# Patient Record
Sex: FEMALE | Race: WHITE | NOT HISPANIC OR LATINO | URBAN - METROPOLITAN AREA
[De-identification: names, ages, dates, MRNs, and addresses within clinical notes are randomized per-mention and may not be internally consistent; named-entity substitution may affect disease eponyms.]

---

## 2017-03-09 ENCOUNTER — OFFICE (OUTPATIENT)
Dept: URBAN - METROPOLITAN AREA CLINIC 64 | Facility: CLINIC | Age: 47
End: 2017-03-09
Payer: COMMERCIAL

## 2017-03-09 VITALS
WEIGHT: 114 LBS | HEART RATE: 93 BPM | SYSTOLIC BLOOD PRESSURE: 94 MMHG | DIASTOLIC BLOOD PRESSURE: 56 MMHG | HEIGHT: 60 IN

## 2017-03-09 DIAGNOSIS — R15.9 FULL INCONTINENCE OF FECES: ICD-10-CM

## 2017-03-09 DIAGNOSIS — R19.4 CHANGE IN BOWEL HABIT: ICD-10-CM

## 2017-03-09 DIAGNOSIS — R13.10 DYSPHAGIA, UNSPECIFIED: ICD-10-CM

## 2017-03-09 DIAGNOSIS — Z80.0 FAMILY HISTORY OF MALIGNANT NEOPLASM OF DIGESTIVE ORGANS: ICD-10-CM

## 2017-03-09 DIAGNOSIS — K59.1 FUNCTIONAL DIARRHEA: ICD-10-CM

## 2017-03-09 PROCEDURE — 99204 OFFICE O/P NEW MOD 45 MIN: CPT | Performed by: NURSE PRACTITIONER

## 2017-03-31 ENCOUNTER — ON CAMPUS - OUTPATIENT (OUTPATIENT)
Dept: URBAN - METROPOLITAN AREA HOSPITAL 77 | Facility: HOSPITAL | Age: 47
End: 2017-03-31
Payer: COMMERCIAL

## 2017-03-31 DIAGNOSIS — Z80.0 FAMILY HISTORY OF MALIGNANT NEOPLASM OF DIGESTIVE ORGANS: ICD-10-CM

## 2017-03-31 DIAGNOSIS — R13.10 DYSPHAGIA, UNSPECIFIED: ICD-10-CM

## 2017-03-31 DIAGNOSIS — K29.90 GASTRODUODENITIS, UNSPECIFIED, WITHOUT BLEEDING: ICD-10-CM

## 2017-03-31 DIAGNOSIS — D12.5 BENIGN NEOPLASM OF SIGMOID COLON: ICD-10-CM

## 2017-03-31 DIAGNOSIS — D12.3 BENIGN NEOPLASM OF TRANSVERSE COLON: ICD-10-CM

## 2017-03-31 DIAGNOSIS — D12.2 BENIGN NEOPLASM OF ASCENDING COLON: ICD-10-CM

## 2017-03-31 PROCEDURE — 45381 COLONOSCOPY SUBMUCOUS NJX: CPT | Mod: 59 | Performed by: INTERNAL MEDICINE

## 2017-03-31 PROCEDURE — 43450 DILATE ESOPHAGUS 1/MULT PASS: CPT | Performed by: INTERNAL MEDICINE

## 2017-03-31 PROCEDURE — 45385 COLONOSCOPY W/LESION REMOVAL: CPT | Mod: PT | Performed by: INTERNAL MEDICINE

## 2017-03-31 PROCEDURE — 43239 EGD BIOPSY SINGLE/MULTIPLE: CPT | Performed by: INTERNAL MEDICINE

## 2017-04-14 ENCOUNTER — OFFICE (OUTPATIENT)
Dept: URBAN - METROPOLITAN AREA CLINIC 64 | Facility: CLINIC | Age: 47
End: 2017-04-14
Payer: COMMERCIAL

## 2017-04-14 VITALS — HEIGHT: 60 IN | SYSTOLIC BLOOD PRESSURE: 113 MMHG | HEART RATE: 92 BPM | DIASTOLIC BLOOD PRESSURE: 67 MMHG

## 2017-04-14 DIAGNOSIS — Z80.0 FAMILY HISTORY OF MALIGNANT NEOPLASM OF DIGESTIVE ORGANS: ICD-10-CM

## 2017-04-14 DIAGNOSIS — K59.1 FUNCTIONAL DIARRHEA: ICD-10-CM

## 2017-04-14 DIAGNOSIS — R15.9 FULL INCONTINENCE OF FECES: ICD-10-CM

## 2017-04-14 DIAGNOSIS — Z86.010 PERSONAL HISTORY OF COLONIC POLYPS: ICD-10-CM

## 2017-04-14 PROCEDURE — 99213 OFFICE O/P EST LOW 20 MIN: CPT | Performed by: NURSE PRACTITIONER

## 2017-04-14 RX ORDER — COLESTIPOL HYDROCHLORIDE 1 G/1
4 TABLET, FILM COATED ORAL
Qty: 120 | Refills: 11 | Status: COMPLETED
Start: 2017-04-14 | End: 2017-04-24

## 2017-04-15 ENCOUNTER — INPATIENT HOSPITAL (OUTPATIENT)
Dept: URBAN - METROPOLITAN AREA HOSPITAL 84 | Facility: HOSPITAL | Age: 47
End: 2017-04-15
Payer: COMMERCIAL

## 2017-04-15 DIAGNOSIS — K91.841 POSTPROCEDURAL HEMORRHAGE OF A DIGESTIVE SYSTEM ORGAN OR STR: ICD-10-CM

## 2017-04-15 PROCEDURE — 43255 EGD CONTROL BLEEDING ANY: CPT | Performed by: INTERNAL MEDICINE

## 2017-06-16 ENCOUNTER — OFFICE (OUTPATIENT)
Dept: URBAN - METROPOLITAN AREA CLINIC 64 | Facility: CLINIC | Age: 47
End: 2017-06-16
Payer: COMMERCIAL

## 2017-06-16 VITALS
SYSTOLIC BLOOD PRESSURE: 115 MMHG | DIASTOLIC BLOOD PRESSURE: 67 MMHG | HEIGHT: 60 IN | HEART RATE: 91 BPM | WEIGHT: 122 LBS

## 2017-06-16 DIAGNOSIS — Z86.010 PERSONAL HISTORY OF COLONIC POLYPS: ICD-10-CM

## 2017-06-16 DIAGNOSIS — K21.9 GASTRO-ESOPHAGEAL REFLUX DISEASE WITHOUT ESOPHAGITIS: ICD-10-CM

## 2017-06-16 DIAGNOSIS — K59.1 FUNCTIONAL DIARRHEA: ICD-10-CM

## 2017-06-16 DIAGNOSIS — R15.9 FULL INCONTINENCE OF FECES: ICD-10-CM

## 2017-06-16 PROCEDURE — 99214 OFFICE O/P EST MOD 30 MIN: CPT | Performed by: NURSE PRACTITIONER

## 2017-06-16 RX ORDER — LOPERAMIDE HCL 2 MG
TABLET ORAL
Qty: 60 | Refills: 5 | Status: ACTIVE
Start: 2017-06-16

## 2017-08-31 ENCOUNTER — ON CAMPUS - OUTPATIENT (OUTPATIENT)
Dept: URBAN - METROPOLITAN AREA HOSPITAL 77 | Facility: HOSPITAL | Age: 47
End: 2017-08-31
Payer: COMMERCIAL

## 2017-08-31 DIAGNOSIS — Z86.010 PERSONAL HISTORY OF COLONIC POLYPS: ICD-10-CM

## 2017-08-31 DIAGNOSIS — K63.5 POLYP OF COLON: ICD-10-CM

## 2017-08-31 DIAGNOSIS — Z80.0 FAMILY HISTORY OF MALIGNANT NEOPLASM OF DIGESTIVE ORGANS: ICD-10-CM

## 2017-08-31 DIAGNOSIS — Z98.890 OTHER SPECIFIED POSTPROCEDURAL STATES: ICD-10-CM

## 2017-08-31 DIAGNOSIS — D12.5 BENIGN NEOPLASM OF SIGMOID COLON: ICD-10-CM

## 2017-08-31 DIAGNOSIS — D12.3 BENIGN NEOPLASM OF TRANSVERSE COLON: ICD-10-CM

## 2017-08-31 PROCEDURE — 45380 COLONOSCOPY AND BIOPSY: CPT | Mod: 59 | Performed by: INTERNAL MEDICINE

## 2017-08-31 PROCEDURE — 45385 COLONOSCOPY W/LESION REMOVAL: CPT | Mod: PT | Performed by: INTERNAL MEDICINE

## 2017-09-14 ENCOUNTER — OFFICE (OUTPATIENT)
Dept: URBAN - METROPOLITAN AREA CLINIC 64 | Facility: CLINIC | Age: 47
End: 2017-09-14
Payer: COMMERCIAL

## 2017-09-14 VITALS
HEIGHT: 60 IN | WEIGHT: 111 LBS | DIASTOLIC BLOOD PRESSURE: 72 MMHG | SYSTOLIC BLOOD PRESSURE: 113 MMHG | HEART RATE: 88 BPM

## 2017-09-14 DIAGNOSIS — K59.1 FUNCTIONAL DIARRHEA: ICD-10-CM

## 2017-09-14 DIAGNOSIS — R15.9 FULL INCONTINENCE OF FECES: ICD-10-CM

## 2017-09-14 DIAGNOSIS — K21.9 GASTRO-ESOPHAGEAL REFLUX DISEASE WITHOUT ESOPHAGITIS: ICD-10-CM

## 2017-09-14 PROCEDURE — 99213 OFFICE O/P EST LOW 20 MIN: CPT | Performed by: NURSE PRACTITIONER

## 2017-12-05 ENCOUNTER — OFFICE (OUTPATIENT)
Dept: URBAN - METROPOLITAN AREA CLINIC 64 | Facility: CLINIC | Age: 47
End: 2017-12-05
Payer: COMMERCIAL

## 2017-12-05 VITALS
HEIGHT: 60 IN | HEART RATE: 90 BPM | DIASTOLIC BLOOD PRESSURE: 74 MMHG | WEIGHT: 121 LBS | SYSTOLIC BLOOD PRESSURE: 120 MMHG

## 2017-12-05 DIAGNOSIS — R15.9 FULL INCONTINENCE OF FECES: ICD-10-CM

## 2017-12-05 DIAGNOSIS — K59.1 FUNCTIONAL DIARRHEA: ICD-10-CM

## 2017-12-05 PROCEDURE — 99213 OFFICE O/P EST LOW 20 MIN: CPT | Performed by: NURSE PRACTITIONER

## 2017-12-05 RX ORDER — ELUXADOLINE 75 MG/1
150 TABLET, FILM COATED ORAL
Qty: 60 | Refills: 0 | Status: ACTIVE
Start: 2017-12-05

## 2018-01-09 ENCOUNTER — OFFICE (OUTPATIENT)
Dept: URBAN - METROPOLITAN AREA CLINIC 64 | Facility: CLINIC | Age: 48
End: 2018-01-09
Payer: COMMERCIAL

## 2018-01-09 VITALS — HEIGHT: 60 IN

## 2018-01-09 DIAGNOSIS — R15.9 FULL INCONTINENCE OF FECES: ICD-10-CM

## 2018-01-09 PROCEDURE — 64561 IMPLANT NEUROELECTRODES: CPT | Mod: 50 | Performed by: INTERNAL MEDICINE

## 2018-01-12 ENCOUNTER — OFFICE (OUTPATIENT)
Dept: URBAN - METROPOLITAN AREA CLINIC 64 | Facility: CLINIC | Age: 48
End: 2018-01-12
Payer: COMMERCIAL

## 2018-01-12 VITALS
DIASTOLIC BLOOD PRESSURE: 79 MMHG | HEIGHT: 60 IN | SYSTOLIC BLOOD PRESSURE: 139 MMHG | HEART RATE: 89 BPM | WEIGHT: 120 LBS

## 2018-01-12 DIAGNOSIS — R15.9 FULL INCONTINENCE OF FECES: ICD-10-CM

## 2018-01-12 PROCEDURE — 99213 OFFICE O/P EST LOW 20 MIN: CPT | Performed by: INTERNAL MEDICINE

## 2018-04-06 ENCOUNTER — OFFICE (OUTPATIENT)
Dept: URBAN - METROPOLITAN AREA CLINIC 64 | Facility: CLINIC | Age: 48
End: 2018-04-06
Payer: COMMERCIAL

## 2018-04-06 VITALS
SYSTOLIC BLOOD PRESSURE: 114 MMHG | HEART RATE: 92 BPM | DIASTOLIC BLOOD PRESSURE: 75 MMHG | HEIGHT: 60 IN | WEIGHT: 114 LBS

## 2018-04-06 DIAGNOSIS — R11.2 NAUSEA WITH VOMITING, UNSPECIFIED: ICD-10-CM

## 2018-04-06 DIAGNOSIS — R15.9 FULL INCONTINENCE OF FECES: ICD-10-CM

## 2018-04-06 DIAGNOSIS — K59.1 FUNCTIONAL DIARRHEA: ICD-10-CM

## 2018-04-06 PROCEDURE — 99213 OFFICE O/P EST LOW 20 MIN: CPT | Performed by: NURSE PRACTITIONER

## 2018-04-06 RX ORDER — ONDANSETRON 4 MG/1
TABLET, ORALLY DISINTEGRATING ORAL
Qty: 45 | Refills: 1 | Status: ACTIVE

## 2018-05-14 ENCOUNTER — OFFICE (OUTPATIENT)
Dept: URBAN - METROPOLITAN AREA CLINIC 64 | Facility: CLINIC | Age: 48
End: 2018-05-14
Payer: COMMERCIAL

## 2018-05-14 VITALS
WEIGHT: 118 LBS | SYSTOLIC BLOOD PRESSURE: 108 MMHG | DIASTOLIC BLOOD PRESSURE: 71 MMHG | HEART RATE: 89 BPM | HEIGHT: 60 IN

## 2018-05-14 DIAGNOSIS — K59.1 FUNCTIONAL DIARRHEA: ICD-10-CM

## 2018-05-14 DIAGNOSIS — K21.9 GASTRO-ESOPHAGEAL REFLUX DISEASE WITHOUT ESOPHAGITIS: ICD-10-CM

## 2018-05-14 DIAGNOSIS — R15.9 FULL INCONTINENCE OF FECES: ICD-10-CM

## 2018-05-14 PROCEDURE — 99213 OFFICE O/P EST LOW 20 MIN: CPT | Performed by: NURSE PRACTITIONER

## 2018-09-14 ENCOUNTER — ON CAMPUS - OUTPATIENT (OUTPATIENT)
Dept: URBAN - METROPOLITAN AREA HOSPITAL 77 | Facility: HOSPITAL | Age: 48
End: 2018-09-14
Payer: COMMERCIAL

## 2018-09-14 DIAGNOSIS — Z86.010 PERSONAL HISTORY OF COLONIC POLYPS: ICD-10-CM

## 2018-09-14 DIAGNOSIS — K62.89 OTHER SPECIFIED DISEASES OF ANUS AND RECTUM: ICD-10-CM

## 2018-09-14 DIAGNOSIS — R15.9 FULL INCONTINENCE OF FECES: ICD-10-CM

## 2018-09-14 DIAGNOSIS — K55.21 ANGIODYSPLASIA OF COLON WITH HEMORRHAGE: ICD-10-CM

## 2018-09-14 DIAGNOSIS — K52.9 NONINFECTIVE GASTROENTERITIS AND COLITIS, UNSPECIFIED: ICD-10-CM

## 2018-09-14 DIAGNOSIS — R19.7 DIARRHEA, UNSPECIFIED: ICD-10-CM

## 2018-09-14 PROCEDURE — 45382 COLONOSCOPY W/CONTROL BLEED: CPT | Mod: 59 | Performed by: INTERNAL MEDICINE

## 2018-09-14 PROCEDURE — 45380 COLONOSCOPY AND BIOPSY: CPT | Performed by: INTERNAL MEDICINE

## 2018-12-06 ENCOUNTER — HOSPITAL ENCOUNTER (OUTPATIENT)
Dept: RHEUMATOLOGY | Facility: CLINIC | Age: 48
Discharge: HOME | End: 2018-12-06
Attending: INTERNAL MEDICINE | Admitting: INTERNAL MEDICINE

## 2018-12-06 ENCOUNTER — HOSPITAL ENCOUNTER (OUTPATIENT)
Dept: LAB | Facility: HOSPITAL | Age: 48
Discharge: HOME | End: 2018-12-06
Attending: INTERNAL MEDICINE | Admitting: INTERNAL MEDICINE

## 2018-12-06 LAB
ALBUMIN SERPL-MCNC: 3.3 G/DL (ref 3.5–4.8)
ALBUMIN SERPL-MCNC: 3.3 G/DL (ref 3.5–4.8)
ALBUMIN/GLOB SERPL: 1 {RATIO} (ref 1–1.7)
ALP SERPL-CCNC: 116 IU/L (ref 32–91)
ALPHA1 GLOB FLD ELPH-MCNC: 0.2 GM/DL (ref 0.1–0.4)
ALPHA2 GLOB SERPL ELPH-MCNC: 0.9 GM/DL (ref 0.5–1)
ALT SERPL-CCNC: 19 IU/L (ref 14–54)
AMPICILLIN SUSC ISLT: NORMAL
ANION GAP SERPL CALC-SCNC: 14.2 MMOL/L (ref 10–20)
AST SERPL-CCNC: 23 IU/L (ref 15–41)
AZTREONAM SUSC ISLT: NORMAL
B-GLOBULIN SERPL ELPH-MCNC: 1.1 GM/DL (ref 0.7–1.4)
BACTERIA ISLT: NORMAL
BACTERIA SPEC AEROBE CULT: NORMAL
BASOPHILS # BLD AUTO: 0 10*3/UL (ref 0–0.2)
BASOPHILS NFR BLD AUTO: 1 % (ref 0–2)
BILIRUB SERPL-MCNC: 1 MG/DL (ref 0.3–1.2)
BILIRUB UR QL STRIP: NEGATIVE MG/DL
BUN SERPL-MCNC: 14 MG/DL (ref 8–20)
BUN/CREAT SERPL: 14 (ref 5.4–26.2)
CALCIUM SERPL-MCNC: 8.7 MG/DL (ref 8.9–10.3)
CASTS URNS QL MICRO: ABNORMAL /[LPF]
CEFAZOLIN SUSC ISLT: NORMAL
CEFEPIME SUSC ISLT: NORMAL
CEFTRIAXONE SUSC ISLT: NORMAL
CHLORIDE SERPL-SCNC: 100 MMOL/L (ref 101–111)
CIPROFLOXACIN SUSC ISLT: NORMAL
CK SERPL-CCNC: 68 IU/L (ref 38–234)
COLONY COUNT: NORMAL
COLOR UR: YELLOW
CONV BACTERIA IN URINE MICRO: ABNORMAL
CONV CLARITY OF URINE: ABNORMAL
CONV CO2: 23 MMOL/L (ref 22–32)
CONV HYALINE CASTS IN URINE MICRO: 4 /[LPF] (ref 0–5)
CONV PROTEIN IN URINE BY AUTOMATED TEST STRIP: NEGATIVE MG/DL
CONV SMALL ROUND CELLS: ABNORMAL /[HPF]
CONV TOTAL PROTEIN: 6.5 G/DL (ref 6.1–7.9)
CONV TOTAL PROTEIN: 6.7 G/DL (ref 6.1–7.9)
CONV UROBILINOGEN IN URINE BY AUTOMATED TEST STRIP: 0.2 MG/DL
CREAT UR-MCNC: 1 MG/DL (ref 0.4–1)
CRP SERPL-MCNC: 0.04 MG/DL (ref 0–0.7)
CULTURE INDICATED?: ABNORMAL
DIFFERENTIAL METHOD BLD: (no result)
EOSINOPHIL # BLD AUTO: 0.1 10*3/UL (ref 0–0.3)
EOSINOPHIL # BLD AUTO: 1 % (ref 0–3)
ERYTHROCYTE [DISTWIDTH] IN BLOOD BY AUTOMATED COUNT: 15.2 % (ref 11.5–14.5)
ERYTHROCYTE [SEDIMENTATION RATE] IN BLOOD BY WESTERGREN METHOD: 40 MM/HR (ref 0–20)
GAMMA GLOB SERPL ELPH-MCNC: 1 GM/DL (ref 0.6–1.6)
GLOBULIN UR ELPH-MCNC: 3.4 G/DL (ref 2.5–3.8)
GLUCOSE SERPL-MCNC: 367 MG/DL (ref 65–99)
GLUCOSE UR QL: 250 MG/DL
HCT VFR BLD AUTO: 35.6 % (ref 35–49)
HGB BLD-MCNC: 11.6 G/DL (ref 12–15)
HGB UR QL STRIP: ABNORMAL
INSULIN SERPL-ACNC: ABNORMAL U[IU]/ML
KETONES UR QL STRIP: NEGATIVE MG/DL
LEUKOCYTE ESTERASE UR QL STRIP: ABNORMAL
LEVOFLOXACIN SUSC ISLT: NORMAL
LYMPHOCYTES # BLD AUTO: 2.4 10*3/UL (ref 0.8–4.8)
LYMPHOCYTES NFR BLD AUTO: 26 % (ref 18–42)
Lab: NORMAL
MCH RBC QN AUTO: 27.7 PG (ref 26–32)
MCHC RBC AUTO-ENTMCNC: 32.5 G/DL (ref 32–36)
MCV RBC AUTO: 85.2 FL (ref 80–94)
MEROPENEM SUSC ISLT: NORMAL
MICRO REPORT STATUS: NORMAL
MONOCYTES # BLD AUTO: 0.5 10*3/UL (ref 0.1–1.3)
MONOCYTES NFR BLD AUTO: 6 % (ref 2–11)
NEUTROPHILS # BLD AUTO: 6.3 10*3/UL (ref 2.3–8.6)
NEUTROPHILS NFR BLD AUTO: 66 % (ref 50–75)
NITRITE UR QL STRIP: POSITIVE
NITROFURANTOIN SUSC ISLT: NORMAL
NRBC BLD AUTO-RTO: 0 /100{WBCS}
NRBC/RBC NFR BLD MANUAL: 0 10*3/UL
PH UR STRIP.AUTO: 5 [PH] (ref 4.5–8)
PIP+TAZO SUSC ISLT: NORMAL
PLATELET # BLD AUTO: 326 10*3/UL (ref 150–450)
PMV BLD AUTO: 9.4 FL (ref 7.4–10.4)
POTASSIUM SERPL-SCNC: 4.2 MMOL/L (ref 3.6–5.1)
RBC # BLD AUTO: 4.17 10*6/UL (ref 4–5.4)
RBC #/AREA URNS HPF: 1 /[HPF] (ref 0–3)
SODIUM SERPL-SCNC: 133 MMOL/L (ref 136–144)
SP GR UR: 1.02 (ref 1–1.03)
SPECIMEN SOURCE: NORMAL
SPERM URNS QL MICRO: ABNORMAL /[HPF]
SQUAMOUS SPT QL MICRO: 5 /[HPF] (ref 0–5)
SUSC METH SPEC: NORMAL
TETRACYCLINE SUSC ISLT: NORMAL
TOBRAMYCIN SUSC ISLT: NORMAL
TRIMETHOPRIM/SULFA: NORMAL
UNIDENT CRYS URNS QL MICRO: ABNORMAL /[HPF]
WBC # BLD AUTO: 9.3 10*3/UL (ref 4.5–11.5)
WBC #/AREA URNS HPF: 12 /[HPF] (ref 0–5)
YEAST SPEC QL WET PREP: ABNORMAL /[HPF]

## 2018-12-06 PROCEDURE — 86481 TB AG RESPONSE T-CELL SUSP: CPT

## 2018-12-07 ENCOUNTER — LAB REQUISITION (OUTPATIENT)
Dept: LAB | Facility: HOSPITAL | Age: 48
End: 2018-12-07

## 2018-12-07 DIAGNOSIS — Z00.00 ROUTINE GENERAL MEDICAL EXAMINATION AT A HEALTH CARE FACILITY: ICD-10-CM

## 2018-12-07 LAB
25(OH)D3 SERPL-MCNC: 34 NG/ML (ref 30–100)
HAV IGM SERPL QL IA: NONREACTIVE
HBV CORE IGM SERPL QL IA: NONREACTIVE
HBV SURFACE AG SERPL QL IA: NONREACTIVE
HCV AB SER DONR QL: NONREACTIVE

## 2018-12-08 LAB
TSPOT INTERPRETATION: NEGATIVE
TSPOT NIL CONTROL INTERPRETATION: NORMAL
TSPOT PANEL A: 0
TSPOT PANEL B: 0
TSPOT POS CONTROL INTERPRETATION: NORMAL

## 2018-12-09 LAB — ACE SERPL-CCNC: 7 U/L (ref 9–67)

## 2018-12-10 LAB — TSPOT INTERPRETATION: NEGATIVE

## 2019-12-12 ENCOUNTER — TELEPHONE (OUTPATIENT)
Dept: ENDOCRINOLOGY | Facility: CLINIC | Age: 49
End: 2019-12-12

## 2019-12-12 NOTE — TELEPHONE ENCOUNTER
Pt called and LVM regarding elevated BG's.  Attempted to call pt but no answer using phone number in chart.  Pt has not been seen 10/22/18 and will need an appt ASAP if and when she calls back

## 2020-01-08 ENCOUNTER — OFFICE (OUTPATIENT)
Dept: URBAN - METROPOLITAN AREA CLINIC 64 | Facility: CLINIC | Age: 50
End: 2020-01-08
Payer: COMMERCIAL

## 2020-01-08 VITALS
HEIGHT: 60 IN | HEART RATE: 85 BPM | SYSTOLIC BLOOD PRESSURE: 75 MMHG | DIASTOLIC BLOOD PRESSURE: 54 MMHG | WEIGHT: 120 LBS

## 2020-01-08 DIAGNOSIS — K21.9 GASTRO-ESOPHAGEAL REFLUX DISEASE WITHOUT ESOPHAGITIS: ICD-10-CM

## 2020-01-08 DIAGNOSIS — K59.1 FUNCTIONAL DIARRHEA: ICD-10-CM

## 2020-01-08 DIAGNOSIS — R15.9 FULL INCONTINENCE OF FECES: ICD-10-CM

## 2020-01-08 PROCEDURE — 99213 OFFICE O/P EST LOW 20 MIN: CPT | Performed by: INTERNAL MEDICINE

## 2020-01-08 RX ORDER — PANTOPRAZOLE SODIUM 40 MG/1
40 TABLET, DELAYED RELEASE ORAL
Qty: 90 | Refills: 3 | Status: ACTIVE
Start: 2017-04-24

## 2020-01-08 RX ORDER — ELUXADOLINE 75 MG/1
150 TABLET, FILM COATED ORAL
Qty: 60 | Refills: 0 | Status: ACTIVE
Start: 2017-12-05

## 2020-01-08 RX ORDER — LOPERAMIDE HYDROCHLORIDE 2 MG/1
8 CAPSULE ORAL
Qty: 120 | Refills: 0 | Status: ACTIVE

## 2022-03-04 ENCOUNTER — LAB (OUTPATIENT)
Dept: LAB | Facility: HOSPITAL | Age: 52
End: 2022-03-04

## 2022-03-04 ENCOUNTER — TRANSCRIBE ORDERS (OUTPATIENT)
Dept: ADMINISTRATIVE | Facility: HOSPITAL | Age: 52
End: 2022-03-04

## 2022-03-04 DIAGNOSIS — Z01.818 PRE-OP TESTING: ICD-10-CM

## 2022-03-04 DIAGNOSIS — Z01.818 PRE-OP TESTING: Primary | ICD-10-CM

## 2022-03-04 PROCEDURE — 0202U NFCT DS 22 TRGT SARS-COV-2: CPT

## 2025-01-01 ENCOUNTER — APPOINTMENT (OUTPATIENT)
Dept: GENERAL RADIOLOGY | Facility: HOSPITAL | Age: 55
End: 2025-01-01
Payer: OTHER MISCELLANEOUS

## 2025-01-01 ENCOUNTER — APPOINTMENT (OUTPATIENT)
Dept: CT IMAGING | Facility: HOSPITAL | Age: 55
End: 2025-01-01
Payer: OTHER MISCELLANEOUS

## 2025-01-01 ENCOUNTER — HOSPITAL ENCOUNTER (INPATIENT)
Facility: HOSPITAL | Age: 55
LOS: 3 days | End: 2025-05-24
Attending: INTERNAL MEDICINE | Admitting: STUDENT IN AN ORGANIZED HEALTH CARE EDUCATION/TRAINING PROGRAM
Payer: OTHER MISCELLANEOUS

## 2025-01-01 ENCOUNTER — APPOINTMENT (OUTPATIENT)
Dept: CARDIOLOGY | Facility: HOSPITAL | Age: 55
End: 2025-01-01
Payer: OTHER MISCELLANEOUS

## 2025-01-01 ENCOUNTER — HOSPITAL ENCOUNTER (INPATIENT)
Facility: HOSPITAL | Age: 55
LOS: 1 days | End: 2025-05-21
Attending: EMERGENCY MEDICINE | Admitting: INTERNAL MEDICINE
Payer: OTHER MISCELLANEOUS

## 2025-01-01 ENCOUNTER — APPOINTMENT (OUTPATIENT)
Dept: CT IMAGING | Facility: HOSPITAL | Age: 55
End: 2025-01-01
Payer: MEDICAID

## 2025-01-01 VITALS
OXYGEN SATURATION: 100 % | HEART RATE: 75 BPM | SYSTOLIC BLOOD PRESSURE: 103 MMHG | TEMPERATURE: 98.1 F | DIASTOLIC BLOOD PRESSURE: 63 MMHG

## 2025-01-01 VITALS
WEIGHT: 119.49 LBS | TEMPERATURE: 95.2 F | HEART RATE: 80 BPM | OXYGEN SATURATION: 94 % | DIASTOLIC BLOOD PRESSURE: 56 MMHG | SYSTOLIC BLOOD PRESSURE: 96 MMHG | HEIGHT: 64 IN | RESPIRATION RATE: 22 BRPM | BODY MASS INDEX: 20.4 KG/M2

## 2025-01-01 DIAGNOSIS — E86.0 DEHYDRATION: ICD-10-CM

## 2025-01-01 DIAGNOSIS — E10.11 DIABETIC KETOACIDOSIS WITH COMA ASSOCIATED WITH TYPE 1 DIABETES MELLITUS: Primary | ICD-10-CM

## 2025-01-01 DIAGNOSIS — I63.9 CEREBROVASCULAR ACCIDENT (CVA), UNSPECIFIED MECHANISM: ICD-10-CM

## 2025-01-01 LAB
ABO GROUP BLD: NORMAL
ACETONE BLD QL: ABNORMAL
ALBUMIN SERPL-MCNC: 2.2 G/DL (ref 3.5–5.2)
ALBUMIN SERPL-MCNC: 3 G/DL (ref 3.5–5.2)
ALBUMIN/GLOB SERPL: 0.8 G/DL
ALBUMIN/GLOB SERPL: 1.9 G/DL
ALP SERPL-CCNC: 235 U/L (ref 39–117)
ALP SERPL-CCNC: 359 U/L (ref 39–117)
ALT SERPL W P-5'-P-CCNC: 13 U/L (ref 1–33)
ALT SERPL W P-5'-P-CCNC: 18 U/L (ref 1–33)
AMMONIA BLD-SCNC: 44 UMOL/L (ref 11–51)
AMPHET+METHAMPHET UR QL: NEGATIVE
AMPHETAMINES UR QL: NEGATIVE
ANION GAP SERPL CALCULATED.3IONS-SCNC: 20.6 MMOL/L (ref 5–15)
ANION GAP SERPL CALCULATED.3IONS-SCNC: 27.5 MMOL/L (ref 5–15)
ANION GAP SERPL CALCULATED.3IONS-SCNC: 28.4 MMOL/L (ref 5–15)
ANION GAP SERPL CALCULATED.3IONS-SCNC: 33 MMOL/L (ref 5–15)
ANION GAP SERPL CALCULATED.3IONS-SCNC: 35.9 MMOL/L (ref 5–15)
ANION GAP SERPL CALCULATED.3IONS-SCNC: 36.6 MMOL/L (ref 5–15)
AORTIC DIMENSIONLESS INDEX: 0.81 (DI)
APTT PPP: 28 SECONDS (ref 22.7–35.4)
ARTERIAL PATENCY WRIST A: POSITIVE
AST SERPL-CCNC: 23 U/L (ref 1–32)
AST SERPL-CCNC: 29 U/L (ref 1–32)
ATMOSPHERIC PRESS: ABNORMAL MM[HG]
ATMOSPHERIC PRESS: ABNORMAL MM[HG]
AV MEAN PRESS GRAD SYS DOP V1V2: 3 MMHG
AV VMAX SYS DOP: 126 CM/SEC
B PARAPERT DNA SPEC QL NAA+PROBE: NOT DETECTED
B PERT DNA SPEC QL NAA+PROBE: NOT DETECTED
BARBITURATES UR QL SCN: NEGATIVE
BASE EXCESS BLDA CALC-SCNC: -27.5 MMOL/L (ref 0–3)
BASE EXCESS BLDV CALC-SCNC: -22.9 MMOL/L (ref -2–2)
BASOPHILS # BLD AUTO: 0.01 10*3/MM3 (ref 0–0.2)
BASOPHILS # BLD AUTO: 0.01 10*3/MM3 (ref 0–0.2)
BASOPHILS # BLD AUTO: 0.03 10*3/MM3 (ref 0–0.2)
BASOPHILS NFR BLD AUTO: 0.1 % (ref 0–1.5)
BDY SITE: ABNORMAL
BDY SITE: ABNORMAL
BENZODIAZ UR QL SCN: NEGATIVE
BH BB BLOOD EXPIRATION DATE: NORMAL
BH BB BLOOD TYPE BARCODE: 6200
BH BB DISPENSE STATUS: NORMAL
BH BB PRODUCT CODE: NORMAL
BH BB UNIT NUMBER: NORMAL
BH CV ECHO LEFT VENTRICLE GLOBAL LONGITUDINAL STRAIN: -17.4 %
BH CV ECHO MEAS - ACS: 1.5 CM
BH CV ECHO MEAS - AO MAX PG: 6.4 MMHG
BH CV ECHO MEAS - AO V2 VTI: 26.5 CM
BH CV ECHO MEAS - AVA(I,D): 1.84 CM2
BH CV ECHO MEAS - EDV(CUBED): 42.9 ML
BH CV ECHO MEAS - EDV(MOD-SP4): 49.9 ML
BH CV ECHO MEAS - EF(MOD-SP4): 62.1 %
BH CV ECHO MEAS - ESV(CUBED): 15.6 ML
BH CV ECHO MEAS - ESV(MOD-SP4): 18.9 ML
BH CV ECHO MEAS - FS: 28.6 %
BH CV ECHO MEAS - IVS/LVPW: 1.14 CM
BH CV ECHO MEAS - IVSD: 0.8 CM
BH CV ECHO MEAS - LA DIMENSION: 2.7 CM
BH CV ECHO MEAS - LAT PEAK E' VEL: 12.1 CM/SEC
BH CV ECHO MEAS - LV DIASTOLIC VOL/BSA (35-75): 32.9 CM2
BH CV ECHO MEAS - LV MASS(C)D: 68.9 GRAMS
BH CV ECHO MEAS - LV MAX PG: 5.2 MMHG
BH CV ECHO MEAS - LV MEAN PG: 3 MMHG
BH CV ECHO MEAS - LV SYSTOLIC VOL/BSA (12-30): 12.5 CM2
BH CV ECHO MEAS - LV V1 MAX: 114 CM/SEC
BH CV ECHO MEAS - LV V1 VTI: 21.5 CM
BH CV ECHO MEAS - LVIDD: 3.5 CM
BH CV ECHO MEAS - LVIDS: 2.5 CM
BH CV ECHO MEAS - LVOT AREA: 2.27 CM2
BH CV ECHO MEAS - LVOT DIAM: 1.7 CM
BH CV ECHO MEAS - LVPWD: 0.7 CM
BH CV ECHO MEAS - MED PEAK E' VEL: 12.6 CM/SEC
BH CV ECHO MEAS - MV A MAX VEL: 76.2 CM/SEC
BH CV ECHO MEAS - MV DEC SLOPE: 861 CM/SEC2
BH CV ECHO MEAS - MV DEC TIME: 0.16 SEC
BH CV ECHO MEAS - MV E MAX VEL: 102 CM/SEC
BH CV ECHO MEAS - MV E/A: 1.34
BH CV ECHO MEAS - MV MAX PG: 6.1 MMHG
BH CV ECHO MEAS - MV MEAN PG: 3 MMHG
BH CV ECHO MEAS - MV P1/2T: 41.8 MSEC
BH CV ECHO MEAS - MV V2 VTI: 18.9 CM
BH CV ECHO MEAS - MVA(P1/2T): 5.3 CM2
BH CV ECHO MEAS - MVA(VTI): 2.6 CM2
BH CV ECHO MEAS - PA ACC TIME: 0.14 SEC
BH CV ECHO MEAS - PA V2 MAX: 113 CM/SEC
BH CV ECHO MEAS - RAP SYSTOLE: 3 MMHG
BH CV ECHO MEAS - RV MAX PG: 1.42 MMHG
BH CV ECHO MEAS - RV V1 MAX: 59.5 CM/SEC
BH CV ECHO MEAS - RV V1 VTI: 11.9 CM
BH CV ECHO MEAS - RVDD: 2.9 CM
BH CV ECHO MEAS - RVSP: 36.9 MMHG
BH CV ECHO MEAS - SV(LVOT): 48.8 ML
BH CV ECHO MEAS - SV(MOD-SP4): 31 ML
BH CV ECHO MEAS - SVI(LVOT): 32.2 ML/M2
BH CV ECHO MEAS - SVI(MOD-SP4): 20.4 ML/M2
BH CV ECHO MEAS - TAPSE (>1.6): 1.61 CM
BH CV ECHO MEAS - TR MAX PG: 33.9 MMHG
BH CV ECHO MEAS - TR MAX VEL: 291 CM/SEC
BH CV ECHO MEASUREMENTS AVERAGE E/E' RATIO: 8.26
BH CV ECHO SHUNT ASSESSMENT PERFORMED (HIDDEN SCRIPTING): 1
BH CV XLRA - TDI S': 11.1 CM/SEC
BILIRUB SERPL-MCNC: 0.2 MG/DL (ref 0–1.2)
BILIRUB SERPL-MCNC: 0.3 MG/DL (ref 0–1.2)
BILIRUB UR QL STRIP: NEGATIVE
BLD GP AB SCN SERPL QL: NEGATIVE
BUN SERPL-MCNC: 17 MG/DL (ref 6–20)
BUN SERPL-MCNC: 18 MG/DL (ref 6–20)
BUN/CREAT SERPL: 7.9 (ref 7–25)
BUN/CREAT SERPL: 8 (ref 7–25)
BUN/CREAT SERPL: 8.3 (ref 7–25)
BUN/CREAT SERPL: 8.6 (ref 7–25)
BUPRENORPHINE SERPL-MCNC: NEGATIVE NG/ML
C PNEUM DNA NPH QL NAA+NON-PROBE: NOT DETECTED
CA-I SERPL ISE-MCNC: 1.12 MMOL/L (ref 1.15–1.3)
CALCIUM SPEC-SCNC: 7.2 MG/DL (ref 8.6–10.5)
CALCIUM SPEC-SCNC: 7.3 MG/DL (ref 8.6–10.5)
CALCIUM SPEC-SCNC: 7.3 MG/DL (ref 8.6–10.5)
CALCIUM SPEC-SCNC: 7.4 MG/DL (ref 8.6–10.5)
CALCIUM SPEC-SCNC: 7.7 MG/DL (ref 8.6–10.5)
CALCIUM SPEC-SCNC: 8.2 MG/DL (ref 8.6–10.5)
CANNABINOIDS SERPL QL: NEGATIVE
CHLORIDE SERPL-SCNC: 102 MMOL/L (ref 98–107)
CHLORIDE SERPL-SCNC: 105 MMOL/L (ref 98–107)
CHLORIDE SERPL-SCNC: 105 MMOL/L (ref 98–107)
CHLORIDE SERPL-SCNC: 106 MMOL/L (ref 98–107)
CHLORIDE SERPL-SCNC: 108 MMOL/L (ref 98–107)
CHLORIDE SERPL-SCNC: 98 MMOL/L (ref 98–107)
CHOLEST SERPL-MCNC: 74 MG/DL (ref 0–200)
CK SERPL-CCNC: 269 U/L (ref 20–180)
CK SERPL-CCNC: 273 U/L (ref 20–180)
CLARITY UR: CLEAR
CO2 BLDA-SCNC: 5 MMOL/L (ref 22–29)
CO2 BLDA-SCNC: <5 MMOL/L (ref 22–29)
CO2 SERPL-SCNC: 10.5 MMOL/L (ref 22–29)
CO2 SERPL-SCNC: 14.4 MMOL/L (ref 22–29)
CO2 SERPL-SCNC: 3.1 MMOL/L (ref 22–29)
CO2 SERPL-SCNC: 3.4 MMOL/L (ref 22–29)
CO2 SERPL-SCNC: 6 MMOL/L (ref 22–29)
CO2 SERPL-SCNC: 9.6 MMOL/L (ref 22–29)
COCAINE UR QL: NEGATIVE
COHGB MFR BLD: 2.6 % (ref 0–3)
COLOR UR: YELLOW
CREAT SERPL-MCNC: 1.97 MG/DL (ref 0.57–1)
CREAT SERPL-MCNC: 2.04 MG/DL (ref 0.57–1)
CREAT SERPL-MCNC: 2.05 MG/DL (ref 0.57–1)
CREAT SERPL-MCNC: 2.06 MG/DL (ref 0.57–1)
CREAT SERPL-MCNC: 2.12 MG/DL (ref 0.57–1)
CREAT SERPL-MCNC: 2.27 MG/DL (ref 0.57–1)
CROSSMATCH INTERPRETATION: NORMAL
D-LACTATE SERPL-SCNC: 1.4 MMOL/L (ref 0.3–2)
DEPRECATED RDW RBC AUTO: 51.4 FL (ref 37–54)
DEPRECATED RDW RBC AUTO: 53 FL (ref 37–54)
DEPRECATED RDW RBC AUTO: 58.8 FL (ref 37–54)
EGFRCR SERPLBLD CKD-EPI 2021: 25.2 ML/MIN/1.73
EGFRCR SERPLBLD CKD-EPI 2021: 27.4 ML/MIN/1.73
EGFRCR SERPLBLD CKD-EPI 2021: 28.4 ML/MIN/1.73
EGFRCR SERPLBLD CKD-EPI 2021: 28.5 ML/MIN/1.73
EGFRCR SERPLBLD CKD-EPI 2021: 28.7 ML/MIN/1.73
EGFRCR SERPLBLD CKD-EPI 2021: 29.9 ML/MIN/1.73
EOSINOPHIL # BLD AUTO: 0 10*3/MM3 (ref 0–0.4)
EOSINOPHIL # BLD AUTO: 0 10*3/MM3 (ref 0–0.4)
EOSINOPHIL # BLD AUTO: 0.03 10*3/MM3 (ref 0–0.4)
EOSINOPHIL NFR BLD AUTO: 0 % (ref 0.3–6.2)
EOSINOPHIL NFR BLD AUTO: 0 % (ref 0.3–6.2)
EOSINOPHIL NFR BLD AUTO: 0.1 % (ref 0.3–6.2)
ERYTHROCYTE [DISTWIDTH] IN BLOOD BY AUTOMATED COUNT: 15.2 % (ref 12.3–15.4)
ERYTHROCYTE [DISTWIDTH] IN BLOOD BY AUTOMATED COUNT: 15.4 % (ref 12.3–15.4)
ERYTHROCYTE [DISTWIDTH] IN BLOOD BY AUTOMATED COUNT: 15.7 % (ref 12.3–15.4)
ETHANOL UR QL: <0.01 %
FERRITIN SERPL-MCNC: 558 NG/ML (ref 13–150)
FLUAV SUBTYP SPEC NAA+PROBE: NOT DETECTED
FLUBV RNA ISLT QL NAA+PROBE: NOT DETECTED
GEN 5 1HR TROPONIN T REFLEX: 88 NG/L
GLOBULIN UR ELPH-MCNC: 1.6 GM/DL
GLOBULIN UR ELPH-MCNC: 2.9 GM/DL
GLUCOSE BLDC GLUCOMTR-MCNC: 126 MG/DL (ref 70–105)
GLUCOSE BLDC GLUCOMTR-MCNC: 143 MG/DL (ref 70–105)
GLUCOSE BLDC GLUCOMTR-MCNC: 174 MG/DL (ref 70–105)
GLUCOSE BLDC GLUCOMTR-MCNC: 199 MG/DL (ref 70–105)
GLUCOSE BLDC GLUCOMTR-MCNC: 203 MG/DL (ref 70–105)
GLUCOSE BLDC GLUCOMTR-MCNC: 211 MG/DL (ref 70–105)
GLUCOSE BLDC GLUCOMTR-MCNC: 222 MG/DL (ref 70–105)
GLUCOSE BLDC GLUCOMTR-MCNC: 227 MG/DL (ref 70–105)
GLUCOSE BLDC GLUCOMTR-MCNC: 228 MG/DL (ref 70–105)
GLUCOSE BLDC GLUCOMTR-MCNC: 248 MG/DL (ref 70–105)
GLUCOSE BLDC GLUCOMTR-MCNC: 251 MG/DL (ref 70–105)
GLUCOSE BLDC GLUCOMTR-MCNC: 252 MG/DL (ref 70–105)
GLUCOSE BLDC GLUCOMTR-MCNC: 255 MG/DL (ref 70–105)
GLUCOSE BLDC GLUCOMTR-MCNC: 301 MG/DL (ref 70–105)
GLUCOSE BLDC GLUCOMTR-MCNC: 335 MG/DL (ref 70–105)
GLUCOSE BLDC GLUCOMTR-MCNC: 346 MG/DL (ref 70–105)
GLUCOSE BLDC GLUCOMTR-MCNC: 383 MG/DL (ref 70–105)
GLUCOSE BLDC GLUCOMTR-MCNC: 388 MG/DL (ref 70–105)
GLUCOSE SERPL-MCNC: 160 MG/DL (ref 65–99)
GLUCOSE SERPL-MCNC: 202 MG/DL (ref 65–99)
GLUCOSE SERPL-MCNC: 230 MG/DL (ref 65–99)
GLUCOSE SERPL-MCNC: 245 MG/DL (ref 65–99)
GLUCOSE SERPL-MCNC: 377 MG/DL (ref 65–99)
GLUCOSE SERPL-MCNC: 397 MG/DL (ref 65–99)
GLUCOSE UR STRIP-MCNC: ABNORMAL MG/DL
HADV DNA SPEC NAA+PROBE: NOT DETECTED
HAPTOGLOB SERPL-MCNC: 127 MG/DL (ref 30–200)
HBA1C MFR BLD: 10.3 % (ref 4.8–5.6)
HCO3 BLDA-SCNC: 1.8 MMOL/L (ref 21–28)
HCO3 BLDV-SCNC: 4.6 MMOL/L (ref 22–26)
HCOV 229E RNA SPEC QL NAA+PROBE: NOT DETECTED
HCOV HKU1 RNA SPEC QL NAA+PROBE: NOT DETECTED
HCOV NL63 RNA SPEC QL NAA+PROBE: NOT DETECTED
HCOV OC43 RNA SPEC QL NAA+PROBE: NOT DETECTED
HCT VFR BLD AUTO: 18.6 % (ref 34–46.6)
HCT VFR BLD AUTO: 19.2 % (ref 34–46.6)
HCT VFR BLD AUTO: 26.5 % (ref 34–46.6)
HCT VFR BLD AUTO: 32.9 % (ref 34–46.6)
HDLC SERPL-MCNC: 27 MG/DL (ref 40–60)
HEMODILUTION: NO
HGB BLD-MCNC: 5.7 G/DL (ref 12–15.9)
HGB BLD-MCNC: 5.8 G/DL (ref 12–15.9)
HGB BLD-MCNC: 8.3 G/DL (ref 12–15.9)
HGB BLD-MCNC: 9.1 G/DL (ref 12–15.9)
HGB UR QL STRIP.AUTO: NEGATIVE
HMPV RNA NPH QL NAA+NON-PROBE: NOT DETECTED
HOLD SPECIMEN: NORMAL
HPIV1 RNA ISLT QL NAA+PROBE: NOT DETECTED
HPIV2 RNA SPEC QL NAA+PROBE: NOT DETECTED
HPIV3 RNA NPH QL NAA+PROBE: NOT DETECTED
HPIV4 P GENE NPH QL NAA+PROBE: NOT DETECTED
IMM GRANULOCYTES # BLD AUTO: 0.13 10*3/MM3 (ref 0–0.05)
IMM GRANULOCYTES # BLD AUTO: 0.18 10*3/MM3 (ref 0–0.05)
IMM GRANULOCYTES # BLD AUTO: 0.59 10*3/MM3 (ref 0–0.05)
IMM GRANULOCYTES NFR BLD AUTO: 1 % (ref 0–0.5)
IMM GRANULOCYTES NFR BLD AUTO: 1.4 % (ref 0–0.5)
IMM GRANULOCYTES NFR BLD AUTO: 2.6 % (ref 0–0.5)
INHALED O2 CONCENTRATION: 21 %
INHALED O2 CONCENTRATION: 21 %
INR PPP: 1.22 (ref 0.9–1.1)
IRON 24H UR-MRATE: 14 MCG/DL (ref 37–145)
IRON SATN MFR SERPL: 23 % (ref 20–50)
KETONES UR QL STRIP: ABNORMAL
LDLC SERPL CALC-MCNC: 18 MG/DL (ref 0–100)
LDLC/HDLC SERPL: 0.41 {RATIO}
LEFT ATRIUM VOLUME INDEX: 11.9 ML/M2
LEUKOCYTE ESTERASE UR QL STRIP.AUTO: NEGATIVE
LIPASE SERPL-CCNC: 21 U/L (ref 13–60)
LV EF BIPLANE MOD: 62 %
LYMPHOCYTES # BLD AUTO: 1.85 10*3/MM3 (ref 0.7–3.1)
LYMPHOCYTES # BLD AUTO: 2.01 10*3/MM3 (ref 0.7–3.1)
LYMPHOCYTES # BLD AUTO: 2.16 10*3/MM3 (ref 0.7–3.1)
LYMPHOCYTES NFR BLD AUTO: 15.6 % (ref 19.6–45.3)
LYMPHOCYTES NFR BLD AUTO: 16.2 % (ref 19.6–45.3)
LYMPHOCYTES NFR BLD AUTO: 8.3 % (ref 19.6–45.3)
Lab: ABNORMAL
M PNEUMO IGG SER IA-ACNC: NOT DETECTED
MAGNESIUM SERPL-MCNC: 1.7 MG/DL (ref 1.6–2.6)
MAGNESIUM SERPL-MCNC: 1.7 MG/DL (ref 1.6–2.6)
MAGNESIUM SERPL-MCNC: 1.9 MG/DL (ref 1.6–2.6)
MAGNESIUM SERPL-MCNC: 1.9 MG/DL (ref 1.6–2.6)
MAGNESIUM SERPL-MCNC: 2.2 MG/DL (ref 1.6–2.6)
MAGNESIUM SERPL-MCNC: 2.3 MG/DL (ref 1.6–2.6)
MCH RBC QN AUTO: 28.2 PG (ref 26.6–33)
MCH RBC QN AUTO: 28.4 PG (ref 26.6–33)
MCH RBC QN AUTO: 28.6 PG (ref 26.6–33)
MCHC RBC AUTO-ENTMCNC: 27.7 G/DL (ref 31.5–35.7)
MCHC RBC AUTO-ENTMCNC: 30.2 G/DL (ref 31.5–35.7)
MCHC RBC AUTO-ENTMCNC: 30.6 G/DL (ref 31.5–35.7)
MCV RBC AUTO: 101.9 FL (ref 79–97)
MCV RBC AUTO: 93.5 FL (ref 79–97)
MCV RBC AUTO: 94.1 FL (ref 79–97)
METHADONE UR QL SCN: NEGATIVE
MODALITY: ABNORMAL
MODALITY: ABNORMAL
MONOCYTES # BLD AUTO: 0.37 10*3/MM3 (ref 0.1–0.9)
MONOCYTES # BLD AUTO: 0.41 10*3/MM3 (ref 0.1–0.9)
MONOCYTES # BLD AUTO: 0.58 10*3/MM3 (ref 0.1–0.9)
MONOCYTES NFR BLD AUTO: 2.6 % (ref 5–12)
MONOCYTES NFR BLD AUTO: 2.9 % (ref 5–12)
MONOCYTES NFR BLD AUTO: 3.1 % (ref 5–12)
MRSA DNA SPEC QL NAA+PROBE: NORMAL
NEUTROPHILS NFR BLD AUTO: 10.35 10*3/MM3 (ref 1.7–7)
NEUTROPHILS NFR BLD AUTO: 10.6 10*3/MM3 (ref 1.7–7)
NEUTROPHILS NFR BLD AUTO: 19.3 10*3/MM3 (ref 1.7–7)
NEUTROPHILS NFR BLD AUTO: 79.6 % (ref 42.7–76)
NEUTROPHILS NFR BLD AUTO: 80 % (ref 42.7–76)
NEUTROPHILS NFR BLD AUTO: 86.3 % (ref 42.7–76)
NITRITE UR QL STRIP: NEGATIVE
NOTIFIED WHO: ABNORMAL
NRBC BLD AUTO-RTO: 0.3 /100 WBC (ref 0–0.2)
NRBC BLD AUTO-RTO: 0.4 /100 WBC (ref 0–0.2)
NRBC BLD AUTO-RTO: 0.6 /100 WBC (ref 0–0.2)
OPIATES UR QL: NEGATIVE
OSMOLALITY SERPL: 333 MOSM/KG (ref 275–295)
OXYCODONE UR QL SCN: POSITIVE
PCO2 BLDA: 7.5 MM HG (ref 35–48)
PCO2 BLDV: 14.5 MM HG (ref 42–51)
PCP UR QL SCN: NEGATIVE
PH BLDA: 6.99 PH UNITS (ref 7.35–7.45)
PH BLDV: 7.11 PH UNITS (ref 7.32–7.43)
PH UR STRIP.AUTO: 5.5 [PH] (ref 5–8)
PHOSPHATE SERPL-MCNC: 2.3 MG/DL (ref 2.5–4.5)
PHOSPHATE SERPL-MCNC: 2.5 MG/DL (ref 2.5–4.5)
PHOSPHATE SERPL-MCNC: 3.2 MG/DL (ref 2.5–4.5)
PHOSPHATE SERPL-MCNC: 4.5 MG/DL (ref 2.5–4.5)
PHOSPHATE SERPL-MCNC: 6.2 MG/DL (ref 2.5–4.5)
PHOSPHATE SERPL-MCNC: 6.6 MG/DL (ref 2.5–4.5)
PLATELET # BLD AUTO: 183 10*3/MM3 (ref 140–450)
PLATELET # BLD AUTO: 192 10*3/MM3 (ref 140–450)
PLATELET # BLD AUTO: 364 10*3/MM3 (ref 140–450)
PMV BLD AUTO: 11.5 FL (ref 6–12)
PMV BLD AUTO: 11.6 FL (ref 6–12)
PMV BLD AUTO: 12 FL (ref 6–12)
PO2 BLD: 574 MM[HG] (ref 0–500)
PO2 BLDA: 120.6 MM HG (ref 83–108)
PO2 BLDV: 44.1 MM HG (ref 40–42)
POTASSIUM SERPL-SCNC: 3.4 MMOL/L (ref 3.5–5.2)
POTASSIUM SERPL-SCNC: 3.7 MMOL/L (ref 3.5–5.2)
POTASSIUM SERPL-SCNC: 3.7 MMOL/L (ref 3.5–5.2)
POTASSIUM SERPL-SCNC: 3.8 MMOL/L (ref 3.5–5.2)
POTASSIUM SERPL-SCNC: 4.7 MMOL/L (ref 3.5–5.2)
POTASSIUM SERPL-SCNC: 4.8 MMOL/L (ref 3.5–5.2)
PROCALCITONIN SERPL-MCNC: 1.17 NG/ML (ref 0–0.25)
PROT SERPL-MCNC: 4.6 G/DL (ref 6–8.5)
PROT SERPL-MCNC: 5.1 G/DL (ref 6–8.5)
PROT UR QL STRIP: ABNORMAL
PROTHROMBIN TIME: 15.4 SECONDS (ref 11.7–14.2)
QT INTERVAL: 441 MS
QTC INTERVAL: 509 MS
RBC # BLD AUTO: 1.99 10*6/MM3 (ref 3.77–5.28)
RBC # BLD AUTO: 2.04 10*6/MM3 (ref 3.77–5.28)
RBC # BLD AUTO: 3.23 10*6/MM3 (ref 3.77–5.28)
READ BACK: ABNORMAL
RETICS # AUTO: 0.02 10*6/MM3 (ref 0.02–0.13)
RETICS/RBC NFR AUTO: 1.25 % (ref 0.7–1.9)
RH BLD: POSITIVE
RHINOVIRUS RNA SPEC NAA+PROBE: NOT DETECTED
RSV RNA NPH QL NAA+NON-PROBE: NOT DETECTED
SAO2 % BLDCOA: 96 % (ref 94–98)
SAO2 % BLDCOV: 65.2 % (ref 45–75)
SARS-COV-2 RNA RESP QL NAA+PROBE: NOT DETECTED
SINUS: 3 CM
SODIUM SERPL-SCNC: 138 MMOL/L (ref 136–145)
SODIUM SERPL-SCNC: 141 MMOL/L (ref 136–145)
SODIUM SERPL-SCNC: 143 MMOL/L (ref 136–145)
SODIUM SERPL-SCNC: 145 MMOL/L (ref 136–145)
SP GR UR STRIP: 1.01 (ref 1–1.03)
T&S EXPIRATION DATE: NORMAL
TIBC SERPL-MCNC: 60 MCG/DL (ref 298–536)
TRANSFERRIN SERPL-MCNC: 40 MG/DL (ref 200–360)
TRICYCLICS UR QL SCN: NEGATIVE
TRIGL SERPL-MCNC: 180 MG/DL (ref 0–150)
TROPONIN T % DELTA: -7
TROPONIN T NUMERIC DELTA: -7 NG/L
TROPONIN T SERPL HS-MCNC: 95 NG/L
TSH SERPL DL<=0.05 MIU/L-ACNC: 2.89 UIU/ML (ref 0.27–4.2)
UNIT  ABO: NORMAL
UNIT  RH: NORMAL
UROBILINOGEN UR QL STRIP: ABNORMAL
VANCOMYCIN SERPL-MCNC: 10.4 MCG/ML (ref 5–40)
VIT B12 BLD-MCNC: >2000 PG/ML (ref 211–946)
VLDLC SERPL-MCNC: 29 MG/DL (ref 5–40)
WBC NRBC COR # BLD AUTO: 12.92 10*3/MM3 (ref 3.4–10.8)
WBC NRBC COR # BLD AUTO: 13.31 10*3/MM3 (ref 3.4–10.8)
WBC NRBC COR # BLD AUTO: 22.38 10*3/MM3 (ref 3.4–10.8)

## 2025-01-01 PROCEDURE — 82728 ASSAY OF FERRITIN: CPT | Performed by: INTERNAL MEDICINE

## 2025-01-01 PROCEDURE — 0202U NFCT DS 22 TRGT SARS-COV-2: CPT

## 2025-01-01 PROCEDURE — 83036 HEMOGLOBIN GLYCOSYLATED A1C: CPT | Performed by: EMERGENCY MEDICINE

## 2025-01-01 PROCEDURE — 87641 MR-STAPH DNA AMP PROBE: CPT

## 2025-01-01 PROCEDURE — 82948 REAGENT STRIP/BLOOD GLUCOSE: CPT

## 2025-01-01 PROCEDURE — 82803 BLOOD GASES ANY COMBINATION: CPT

## 2025-01-01 PROCEDURE — 82009 KETONE BODYS QUAL: CPT | Performed by: EMERGENCY MEDICINE

## 2025-01-01 PROCEDURE — 86901 BLOOD TYPING SEROLOGIC RH(D): CPT

## 2025-01-01 PROCEDURE — 25010000002 ALBUMIN HUMAN 5% PER 50 ML: Performed by: INTERNAL MEDICINE

## 2025-01-01 PROCEDURE — 84100 ASSAY OF PHOSPHORUS: CPT | Performed by: EMERGENCY MEDICINE

## 2025-01-01 PROCEDURE — 82140 ASSAY OF AMMONIA: CPT | Performed by: EMERGENCY MEDICINE

## 2025-01-01 PROCEDURE — 36415 COLL VENOUS BLD VENIPUNCTURE: CPT

## 2025-01-01 PROCEDURE — 93356 MYOCRD STRAIN IMG SPCKL TRCK: CPT | Performed by: INTERNAL MEDICINE

## 2025-01-01 PROCEDURE — 83930 ASSAY OF BLOOD OSMOLALITY: CPT | Performed by: EMERGENCY MEDICINE

## 2025-01-01 PROCEDURE — 84145 PROCALCITONIN (PCT): CPT | Performed by: EMERGENCY MEDICINE

## 2025-01-01 PROCEDURE — 99285 EMERGENCY DEPT VISIT HI MDM: CPT

## 2025-01-01 PROCEDURE — 85014 HEMATOCRIT: CPT | Performed by: NURSE PRACTITIONER

## 2025-01-01 PROCEDURE — 82375 ASSAY CARBOXYHB QUANT: CPT | Performed by: EMERGENCY MEDICINE

## 2025-01-01 PROCEDURE — 80048 BASIC METABOLIC PNL TOTAL CA: CPT | Performed by: EMERGENCY MEDICINE

## 2025-01-01 PROCEDURE — 36430 TRANSFUSION BLD/BLD COMPNT: CPT

## 2025-01-01 PROCEDURE — 71045 X-RAY EXAM CHEST 1 VIEW: CPT

## 2025-01-01 PROCEDURE — 85610 PROTHROMBIN TIME: CPT | Performed by: EMERGENCY MEDICINE

## 2025-01-01 PROCEDURE — 84484 ASSAY OF TROPONIN QUANT: CPT | Performed by: EMERGENCY MEDICINE

## 2025-01-01 PROCEDURE — 93306 TTE W/DOPPLER COMPLETE: CPT | Performed by: INTERNAL MEDICINE

## 2025-01-01 PROCEDURE — 25010000002 ALBUMIN HUMAN 25% PER 50 ML

## 2025-01-01 PROCEDURE — 82948 REAGENT STRIP/BLOOD GLUCOSE: CPT | Performed by: EMERGENCY MEDICINE

## 2025-01-01 PROCEDURE — 25810000003 LACTATED RINGERS SOLUTION

## 2025-01-01 PROCEDURE — 83690 ASSAY OF LIPASE: CPT | Performed by: EMERGENCY MEDICINE

## 2025-01-01 PROCEDURE — 83605 ASSAY OF LACTIC ACID: CPT

## 2025-01-01 PROCEDURE — 02HV33Z INSERTION OF INFUSION DEVICE INTO SUPERIOR VENA CAVA, PERCUTANEOUS APPROACH: ICD-10-PCS

## 2025-01-01 PROCEDURE — 82010 KETONE BODYS QUAN: CPT | Performed by: INTERNAL MEDICINE

## 2025-01-01 PROCEDURE — 25010000002 HYDROMORPHONE 1 MG/ML SOLUTION: Performed by: NURSE PRACTITIONER

## 2025-01-01 PROCEDURE — 85730 THROMBOPLASTIN TIME PARTIAL: CPT | Performed by: EMERGENCY MEDICINE

## 2025-01-01 PROCEDURE — 86900 BLOOD TYPING SEROLOGIC ABO: CPT

## 2025-01-01 PROCEDURE — 86901 BLOOD TYPING SEROLOGIC RH(D): CPT | Performed by: EMERGENCY MEDICINE

## 2025-01-01 PROCEDURE — 83735 ASSAY OF MAGNESIUM: CPT | Performed by: EMERGENCY MEDICINE

## 2025-01-01 PROCEDURE — 86850 RBC ANTIBODY SCREEN: CPT | Performed by: EMERGENCY MEDICINE

## 2025-01-01 PROCEDURE — 25010000002 SODIUM CHLORIDE 0.9 % WITH KCL 20 MEQ 20-0.9 MEQ/L-% SOLUTION: Performed by: EMERGENCY MEDICINE

## 2025-01-01 PROCEDURE — 25810000003 SODIUM CHLORIDE 0.9 % SOLUTION 250 ML FLEX CONT: Performed by: EMERGENCY MEDICINE

## 2025-01-01 PROCEDURE — 25010000002 HYALURONIDASE (HUMAN) 150 UNIT/ML SOLUTION 1 ML VIAL: Performed by: INTERNAL MEDICINE

## 2025-01-01 PROCEDURE — 82330 ASSAY OF CALCIUM: CPT

## 2025-01-01 PROCEDURE — 83010 ASSAY OF HAPTOGLOBIN QUANT: CPT | Performed by: INTERNAL MEDICINE

## 2025-01-01 PROCEDURE — 86900 BLOOD TYPING SEROLOGIC ABO: CPT | Performed by: EMERGENCY MEDICINE

## 2025-01-01 PROCEDURE — 83540 ASSAY OF IRON: CPT | Performed by: INTERNAL MEDICINE

## 2025-01-01 PROCEDURE — 86923 COMPATIBILITY TEST ELECTRIC: CPT

## 2025-01-01 PROCEDURE — 82803 BLOOD GASES ANY COMBINATION: CPT | Performed by: EMERGENCY MEDICINE

## 2025-01-01 PROCEDURE — 80061 LIPID PANEL: CPT

## 2025-01-01 PROCEDURE — 85045 AUTOMATED RETICULOCYTE COUNT: CPT | Performed by: INTERNAL MEDICINE

## 2025-01-01 PROCEDURE — 80306 DRUG TEST PRSMV INSTRMNT: CPT | Performed by: EMERGENCY MEDICINE

## 2025-01-01 PROCEDURE — 25010000002 CEFEPIME PER 500 MG: Performed by: EMERGENCY MEDICINE

## 2025-01-01 PROCEDURE — 93306 TTE W/DOPPLER COMPLETE: CPT

## 2025-01-01 PROCEDURE — P9047 ALBUMIN (HUMAN), 25%, 50ML: HCPCS

## 2025-01-01 PROCEDURE — P9045 ALBUMIN (HUMAN), 5%, 250 ML: HCPCS | Performed by: INTERNAL MEDICINE

## 2025-01-01 PROCEDURE — 74176 CT ABD & PELVIS W/O CONTRAST: CPT

## 2025-01-01 PROCEDURE — 84443 ASSAY THYROID STIM HORMONE: CPT | Performed by: EMERGENCY MEDICINE

## 2025-01-01 PROCEDURE — 99221 1ST HOSP IP/OBS SF/LOW 40: CPT | Performed by: PSYCHIATRY & NEUROLOGY

## 2025-01-01 PROCEDURE — 25010000002 DIAZEPAM PER 5 MG: Performed by: NURSE PRACTITIONER

## 2025-01-01 PROCEDURE — 25010000002 POTASSIUM CHLORIDE PER 2 MEQ: Performed by: INTERNAL MEDICINE

## 2025-01-01 PROCEDURE — P9016 RBC LEUKOCYTES REDUCED: HCPCS

## 2025-01-01 PROCEDURE — 82550 ASSAY OF CK (CPK): CPT | Performed by: NURSE PRACTITIONER

## 2025-01-01 PROCEDURE — 82607 VITAMIN B-12: CPT | Performed by: PSYCHIATRY & NEUROLOGY

## 2025-01-01 PROCEDURE — 80053 COMPREHEN METABOLIC PANEL: CPT | Performed by: EMERGENCY MEDICINE

## 2025-01-01 PROCEDURE — 25010000002 VANCOMYCIN 1 G RECONSTITUTED SOLUTION 1 EACH VIAL: Performed by: EMERGENCY MEDICINE

## 2025-01-01 PROCEDURE — 85018 HEMOGLOBIN: CPT | Performed by: NURSE PRACTITIONER

## 2025-01-01 PROCEDURE — 25810000003 SODIUM CHLORIDE 0.9 % SOLUTION: Performed by: EMERGENCY MEDICINE

## 2025-01-01 PROCEDURE — 81003 URINALYSIS AUTO W/O SCOPE: CPT | Performed by: EMERGENCY MEDICINE

## 2025-01-01 PROCEDURE — 80202 ASSAY OF VANCOMYCIN: CPT

## 2025-01-01 PROCEDURE — 70450 CT HEAD/BRAIN W/O DYE: CPT

## 2025-01-01 PROCEDURE — 93356 MYOCRD STRAIN IMG SPCKL TRCK: CPT

## 2025-01-01 PROCEDURE — 82550 ASSAY OF CK (CPK): CPT | Performed by: EMERGENCY MEDICINE

## 2025-01-01 PROCEDURE — 93005 ELECTROCARDIOGRAM TRACING: CPT | Performed by: EMERGENCY MEDICINE

## 2025-01-01 PROCEDURE — 87040 BLOOD CULTURE FOR BACTERIA: CPT | Performed by: EMERGENCY MEDICINE

## 2025-01-01 PROCEDURE — 82077 ASSAY SPEC XCP UR&BREATH IA: CPT | Performed by: EMERGENCY MEDICINE

## 2025-01-01 PROCEDURE — 85025 COMPLETE CBC W/AUTO DIFF WBC: CPT

## 2025-01-01 PROCEDURE — 25010000002 CEFTRIAXONE PER 250 MG: Performed by: INTERNAL MEDICINE

## 2025-01-01 PROCEDURE — 84466 ASSAY OF TRANSFERRIN: CPT | Performed by: INTERNAL MEDICINE

## 2025-01-01 PROCEDURE — 36600 WITHDRAWAL OF ARTERIAL BLOOD: CPT | Performed by: EMERGENCY MEDICINE

## 2025-01-01 PROCEDURE — 85025 COMPLETE CBC W/AUTO DIFF WBC: CPT | Performed by: EMERGENCY MEDICINE

## 2025-01-01 RX ORDER — BISACODYL 10 MG
10 SUPPOSITORY, RECTAL RECTAL DAILY PRN
Status: DISCONTINUED | OUTPATIENT
Start: 2025-01-01 | End: 2025-01-01

## 2025-01-01 RX ORDER — PANTOPRAZOLE SODIUM 40 MG/10ML
40 INJECTION, POWDER, LYOPHILIZED, FOR SOLUTION INTRAVENOUS
Status: DISCONTINUED | OUTPATIENT
Start: 2025-01-01 | End: 2025-01-01

## 2025-01-01 RX ORDER — DEXTROSE MONOHYDRATE, SODIUM CHLORIDE, AND POTASSIUM CHLORIDE 50; 2.98; 4.5 G/1000ML; G/1000ML; G/1000ML
125 INJECTION, SOLUTION INTRAVENOUS CONTINUOUS PRN
Status: DISCONTINUED | OUTPATIENT
Start: 2025-01-01 | End: 2025-01-01

## 2025-01-01 RX ORDER — ACETAMINOPHEN 325 MG/1
650 TABLET ORAL EVERY 4 HOURS PRN
Status: DISCONTINUED | OUTPATIENT
Start: 2025-01-01 | End: 2025-01-01 | Stop reason: HOSPADM

## 2025-01-01 RX ORDER — SODIUM CHLORIDE 0.9 % (FLUSH) 0.9 %
10 SYRINGE (ML) INJECTION EVERY 12 HOURS SCHEDULED
Status: DISCONTINUED | OUTPATIENT
Start: 2025-01-01 | End: 2025-01-01

## 2025-01-01 RX ORDER — SODIUM CHLORIDE 9 MG/ML
200 INJECTION, SOLUTION INTRAVENOUS CONTINUOUS PRN
Status: DISCONTINUED | OUTPATIENT
Start: 2025-01-01 | End: 2025-01-01

## 2025-01-01 RX ORDER — CARBIDOPA/LEVODOPA 25MG-250MG
1 TABLET ORAL 2 TIMES DAILY
Status: DISCONTINUED | OUTPATIENT
Start: 2025-01-01 | End: 2025-01-01

## 2025-01-01 RX ORDER — DEXTROSE MONOHYDRATE, SODIUM CHLORIDE, AND POTASSIUM CHLORIDE 50; 1.49; 4.5 G/1000ML; G/1000ML; G/1000ML
125 INJECTION, SOLUTION INTRAVENOUS CONTINUOUS PRN
Status: DISCONTINUED | OUTPATIENT
Start: 2025-01-01 | End: 2025-01-01

## 2025-01-01 RX ORDER — SODIUM CHLORIDE AND POTASSIUM CHLORIDE 150; 450 MG/100ML; MG/100ML
200 INJECTION, SOLUTION INTRAVENOUS CONTINUOUS PRN
Status: DISCONTINUED | OUTPATIENT
Start: 2025-01-01 | End: 2025-01-01

## 2025-01-01 RX ORDER — DIAZEPAM 10 MG/2ML
5 INJECTION, SOLUTION INTRAMUSCULAR; INTRAVENOUS
Status: DISCONTINUED | OUTPATIENT
Start: 2025-01-01 | End: 2025-01-01 | Stop reason: HOSPADM

## 2025-01-01 RX ORDER — DEXTROSE MONOHYDRATE AND SODIUM CHLORIDE 5; .45 G/100ML; G/100ML
125 INJECTION, SOLUTION INTRAVENOUS CONTINUOUS PRN
Status: DISCONTINUED | OUTPATIENT
Start: 2025-01-01 | End: 2025-01-01

## 2025-01-01 RX ORDER — DEXTROSE MONOHYDRATE 25 G/50ML
10-50 INJECTION, SOLUTION INTRAVENOUS
Status: DISCONTINUED | OUTPATIENT
Start: 2025-01-01 | End: 2025-01-01

## 2025-01-01 RX ORDER — GLYCOPYRROLATE 0.2 MG/ML
0.4 INJECTION INTRAMUSCULAR; INTRAVENOUS
Status: DISCONTINUED | OUTPATIENT
Start: 2025-01-01 | End: 2025-01-01 | Stop reason: HOSPADM

## 2025-01-01 RX ORDER — IPRATROPIUM BROMIDE AND ALBUTEROL SULFATE 2.5; .5 MG/3ML; MG/3ML
3 SOLUTION RESPIRATORY (INHALATION) EVERY 6 HOURS PRN
Status: DISCONTINUED | OUTPATIENT
Start: 2025-01-01 | End: 2025-01-01

## 2025-01-01 RX ORDER — BISACODYL 5 MG/1
5 TABLET, DELAYED RELEASE ORAL DAILY PRN
Status: DISCONTINUED | OUTPATIENT
Start: 2025-01-01 | End: 2025-01-01

## 2025-01-01 RX ORDER — INDOMETHACIN 25 MG/1
50 CAPSULE ORAL ONCE
Status: COMPLETED | OUTPATIENT
Start: 2025-01-01 | End: 2025-01-01

## 2025-01-01 RX ORDER — SODIUM CHLORIDE AND POTASSIUM CHLORIDE 300; 900 MG/100ML; MG/100ML
200 INJECTION, SOLUTION INTRAVENOUS CONTINUOUS PRN
Status: DISCONTINUED | OUTPATIENT
Start: 2025-01-01 | End: 2025-01-01

## 2025-01-01 RX ORDER — SODIUM CHLORIDE 9 MG/ML
40 INJECTION, SOLUTION INTRAVENOUS AS NEEDED
Status: DISCONTINUED | OUTPATIENT
Start: 2025-01-01 | End: 2025-01-01

## 2025-01-01 RX ORDER — GLYCOPYRROLATE 0.2 MG/ML
0.4 INJECTION INTRAMUSCULAR; INTRAVENOUS
Status: CANCELLED | OUTPATIENT
Start: 2025-01-01

## 2025-01-01 RX ORDER — ATROPINE SULFATE 10 MG/ML
2 SOLUTION/ DROPS OPHTHALMIC 2 TIMES DAILY PRN
Status: CANCELLED | OUTPATIENT
Start: 2025-01-01

## 2025-01-01 RX ORDER — ACETAMINOPHEN 650 MG/1
650 SUPPOSITORY RECTAL EVERY 4 HOURS PRN
Status: CANCELLED | OUTPATIENT
Start: 2025-01-01

## 2025-01-01 RX ORDER — OMEPRAZOLE 40 MG/1
40 CAPSULE, DELAYED RELEASE ORAL DAILY
COMMUNITY

## 2025-01-01 RX ORDER — IBUPROFEN 600 MG/1
1 TABLET ORAL
Status: DISCONTINUED | OUTPATIENT
Start: 2025-01-01 | End: 2025-01-01

## 2025-01-01 RX ORDER — HEPARIN SODIUM 5000 [USP'U]/ML
5000 INJECTION, SOLUTION INTRAVENOUS; SUBCUTANEOUS EVERY 8 HOURS SCHEDULED
Status: DISCONTINUED | OUTPATIENT
Start: 2025-01-01 | End: 2025-01-01

## 2025-01-01 RX ORDER — ACETAMINOPHEN 650 MG/1
650 SUPPOSITORY RECTAL EVERY 4 HOURS PRN
Status: DISCONTINUED | OUTPATIENT
Start: 2025-01-01 | End: 2025-01-01 | Stop reason: HOSPADM

## 2025-01-01 RX ORDER — SCOPOLAMINE 1 MG/3D
1 PATCH, EXTENDED RELEASE TRANSDERMAL
Status: DISCONTINUED | OUTPATIENT
Start: 2025-01-01 | End: 2025-01-01 | Stop reason: HOSPADM

## 2025-01-01 RX ORDER — DIAZEPAM 10 MG/2ML
5 INJECTION, SOLUTION INTRAMUSCULAR; INTRAVENOUS
Status: CANCELLED | OUTPATIENT
Start: 2025-01-01 | End: 2025-05-26

## 2025-01-01 RX ORDER — ALBUMIN (HUMAN) 12.5 G/50ML
25 SOLUTION INTRAVENOUS EVERY 6 HOURS
Status: COMPLETED | OUTPATIENT
Start: 2025-01-01 | End: 2025-01-01

## 2025-01-01 RX ORDER — NICOTINE POLACRILEX 4 MG
15 LOZENGE BUCCAL
Status: DISCONTINUED | OUTPATIENT
Start: 2025-01-01 | End: 2025-01-01

## 2025-01-01 RX ORDER — DEXTROSE MONOHYDRATE AND SODIUM CHLORIDE 5; .9 G/100ML; G/100ML
125 INJECTION, SOLUTION INTRAVENOUS CONTINUOUS PRN
Status: DISCONTINUED | OUTPATIENT
Start: 2025-01-01 | End: 2025-01-01

## 2025-01-01 RX ORDER — POLYETHYLENE GLYCOL 3350 17 G/17G
17 POWDER, FOR SOLUTION ORAL DAILY PRN
Status: DISCONTINUED | OUTPATIENT
Start: 2025-01-01 | End: 2025-01-01

## 2025-01-01 RX ORDER — NOREPINEPHRINE BITARTRATE 0.03 MG/ML
.02-.3 INJECTION, SOLUTION INTRAVENOUS
Status: DISCONTINUED | OUTPATIENT
Start: 2025-01-01 | End: 2025-01-01

## 2025-01-01 RX ORDER — ACETAMINOPHEN 160 MG/5ML
650 SOLUTION ORAL EVERY 4 HOURS PRN
Status: DISCONTINUED | OUTPATIENT
Start: 2025-01-01 | End: 2025-01-01 | Stop reason: HOSPADM

## 2025-01-01 RX ORDER — DIPHENOXYLATE HYDROCHLORIDE AND ATROPINE SULFATE 2.5; .025 MG/1; MG/1
1 TABLET ORAL
Status: DISCONTINUED | OUTPATIENT
Start: 2025-01-01 | End: 2025-01-01 | Stop reason: HOSPADM

## 2025-01-01 RX ORDER — SODIUM CHLORIDE 0.9 % (FLUSH) 0.9 %
10 SYRINGE (ML) INJECTION AS NEEDED
Status: DISCONTINUED | OUTPATIENT
Start: 2025-01-01 | End: 2025-01-01

## 2025-01-01 RX ORDER — SODIUM CHLORIDE 450 MG/100ML
200 INJECTION, SOLUTION INTRAVENOUS CONTINUOUS PRN
Status: DISCONTINUED | OUTPATIENT
Start: 2025-01-01 | End: 2025-01-01

## 2025-01-01 RX ORDER — PROCHLORPERAZINE EDISYLATE 5 MG/ML
5 INJECTION INTRAMUSCULAR; INTRAVENOUS EVERY 6 HOURS PRN
Status: DISCONTINUED | OUTPATIENT
Start: 2025-01-01 | End: 2025-01-01

## 2025-01-01 RX ORDER — CARBIDOPA/LEVODOPA 25MG-250MG
1 TABLET ORAL 2 TIMES DAILY
COMMUNITY

## 2025-01-01 RX ORDER — ACETAMINOPHEN 325 MG/1
650 TABLET ORAL EVERY 4 HOURS PRN
Status: DISCONTINUED | OUTPATIENT
Start: 2025-01-01 | End: 2025-01-01

## 2025-01-01 RX ORDER — ONDANSETRON 4 MG/1
4 TABLET, FILM COATED ORAL EVERY 8 HOURS PRN
COMMUNITY

## 2025-01-01 RX ORDER — ASPIRIN 325 MG
325 TABLET ORAL DAILY
Status: DISCONTINUED | OUTPATIENT
Start: 2025-01-01 | End: 2025-01-01

## 2025-01-01 RX ORDER — ONDANSETRON HYDROCHLORIDE 4 MG/5ML
4 SOLUTION ORAL EVERY 6 HOURS PRN
Status: DISCONTINUED | OUTPATIENT
Start: 2025-01-01 | End: 2025-01-01 | Stop reason: HOSPADM

## 2025-01-01 RX ORDER — CARVEDILOL 6.25 MG/1
12.5 TABLET ORAL 2 TIMES DAILY WITH MEALS
Status: DISCONTINUED | OUTPATIENT
Start: 2025-01-01 | End: 2025-01-01

## 2025-01-01 RX ORDER — DEXTROSE MONOHYDRATE, SODIUM CHLORIDE, AND POTASSIUM CHLORIDE 50; 2.98; 9 G/1000ML; G/1000ML; G/1000ML
125 INJECTION, SOLUTION INTRAVENOUS CONTINUOUS PRN
Status: DISCONTINUED | OUTPATIENT
Start: 2025-01-01 | End: 2025-01-01

## 2025-01-01 RX ORDER — NITROGLYCERIN 0.4 MG/1
0.4 TABLET SUBLINGUAL
Status: DISCONTINUED | OUTPATIENT
Start: 2025-01-01 | End: 2025-01-01

## 2025-01-01 RX ORDER — DEXTROSE MONOHYDRATE, SODIUM CHLORIDE, AND POTASSIUM CHLORIDE 50; 1.49; 9 G/1000ML; G/1000ML; G/1000ML
125 INJECTION, SOLUTION INTRAVENOUS CONTINUOUS PRN
Status: DISCONTINUED | OUTPATIENT
Start: 2025-01-01 | End: 2025-01-01

## 2025-01-01 RX ORDER — DOXYCYCLINE HYCLATE 100 MG
100 TABLET ORAL 2 TIMES DAILY
COMMUNITY

## 2025-01-01 RX ORDER — ALBUMIN HUMAN 50 G/1000ML
1000 SOLUTION INTRAVENOUS ONCE
Status: COMPLETED | OUTPATIENT
Start: 2025-01-01 | End: 2025-01-01

## 2025-01-01 RX ORDER — ASPIRIN 300 MG/1
300 SUPPOSITORY RECTAL DAILY
Status: DISCONTINUED | OUTPATIENT
Start: 2025-01-01 | End: 2025-01-01

## 2025-01-01 RX ORDER — ATORVASTATIN CALCIUM 40 MG/1
80 TABLET, FILM COATED ORAL NIGHTLY
Status: DISCONTINUED | OUTPATIENT
Start: 2025-01-01 | End: 2025-01-01

## 2025-01-01 RX ORDER — AMOXICILLIN 250 MG
2 CAPSULE ORAL 2 TIMES DAILY
Status: DISCONTINUED | OUTPATIENT
Start: 2025-01-01 | End: 2025-01-01

## 2025-01-01 RX ORDER — SODIUM CHLORIDE AND POTASSIUM CHLORIDE 150; 900 MG/100ML; MG/100ML
200 INJECTION, SOLUTION INTRAVENOUS CONTINUOUS PRN
Status: DISCONTINUED | OUTPATIENT
Start: 2025-01-01 | End: 2025-01-01

## 2025-01-01 RX ORDER — OXYCODONE AND ACETAMINOPHEN 10; 325 MG/1; MG/1
1 TABLET ORAL EVERY 6 HOURS PRN
COMMUNITY

## 2025-01-01 RX ORDER — DIPHENOXYLATE HYDROCHLORIDE AND ATROPINE SULFATE 2.5; .025 MG/1; MG/1
1 TABLET ORAL
Status: CANCELLED | OUTPATIENT
Start: 2025-01-01

## 2025-01-01 RX ORDER — POTASSIUM CHLORIDE 29.8 MG/ML
20 INJECTION INTRAVENOUS ONCE
Status: COMPLETED | OUTPATIENT
Start: 2025-01-01 | End: 2025-01-01

## 2025-01-01 RX ORDER — ASPIRIN 81 MG/1
81 TABLET ORAL DAILY
COMMUNITY

## 2025-01-01 RX ORDER — ALUMINA, MAGNESIA, AND SIMETHICONE 2400; 2400; 240 MG/30ML; MG/30ML; MG/30ML
15 SUSPENSION ORAL EVERY 6 HOURS PRN
Status: DISCONTINUED | OUTPATIENT
Start: 2025-01-01 | End: 2025-01-01

## 2025-01-01 RX ORDER — ACETAMINOPHEN 650 MG/1
650 SUPPOSITORY RECTAL EVERY 4 HOURS PRN
Status: DISCONTINUED | OUTPATIENT
Start: 2025-01-01 | End: 2025-01-01

## 2025-01-01 RX ORDER — SCOPOLAMINE 1 MG/3D
1 PATCH, EXTENDED RELEASE TRANSDERMAL
Status: CANCELLED | OUTPATIENT
Start: 2025-01-01

## 2025-01-01 RX ORDER — ACETAMINOPHEN 160 MG/5ML
650 SOLUTION ORAL EVERY 4 HOURS PRN
Status: CANCELLED | OUTPATIENT
Start: 2025-01-01

## 2025-01-01 RX ORDER — ATROPINE SULFATE 10 MG/ML
2 SOLUTION/ DROPS OPHTHALMIC 2 TIMES DAILY PRN
Status: DISCONTINUED | OUTPATIENT
Start: 2025-01-01 | End: 2025-01-01 | Stop reason: HOSPADM

## 2025-01-01 RX ORDER — CARVEDILOL 12.5 MG/1
12.5 TABLET ORAL 2 TIMES DAILY WITH MEALS
COMMUNITY

## 2025-01-01 RX ORDER — LOPERAMIDE HYDROCHLORIDE 2 MG/1
2 CAPSULE ORAL 4 TIMES DAILY PRN
COMMUNITY

## 2025-01-01 RX ORDER — ACETAMINOPHEN 325 MG/1
650 TABLET ORAL EVERY 4 HOURS PRN
Status: CANCELLED | OUTPATIENT
Start: 2025-01-01

## 2025-01-01 RX ADMIN — Medication 10 ML: at 05:15

## 2025-01-01 RX ADMIN — INSULIN HUMAN 14.1 UNITS/HR: 1 INJECTION, SOLUTION INTRAVENOUS at 10:41

## 2025-01-01 RX ADMIN — MUPIROCIN 1 APPLICATION: 20 OINTMENT TOPICAL at 01:47

## 2025-01-01 RX ADMIN — POTASSIUM CHLORIDE 20 MEQ: 29.8 INJECTION, SOLUTION INTRAVENOUS at 10:23

## 2025-01-01 RX ADMIN — VANCOMYCIN HYDROCHLORIDE 1000 MG: 1 INJECTION, POWDER, LYOPHILIZED, FOR SOLUTION INTRAVENOUS at 20:21

## 2025-01-01 RX ADMIN — ALBUMIN (HUMAN) 25 G: 0.25 INJECTION, SOLUTION INTRAVENOUS at 03:47

## 2025-01-01 RX ADMIN — DIAZEPAM 5 MG: 10 INJECTION, SOLUTION INTRAMUSCULAR; INTRAVENOUS at 19:37

## 2025-01-01 RX ADMIN — DIAZEPAM 5 MG: 10 INJECTION, SOLUTION INTRAMUSCULAR; INTRAVENOUS at 05:33

## 2025-01-01 RX ADMIN — Medication 10 ML: at 08:18

## 2025-01-01 RX ADMIN — INSULIN HUMAN 3.2 UNITS/HR: 1 INJECTION, SOLUTION INTRAVENOUS at 20:11

## 2025-01-01 RX ADMIN — HYALURONIDASE (HUMAN RECOMBINANT) 150 UNITS: 150 INJECTION, SOLUTION SUBCUTANEOUS at 03:43

## 2025-01-01 RX ADMIN — SODIUM BICARBONATE 50 MEQ: 84 INJECTION INTRAVENOUS at 20:03

## 2025-01-01 RX ADMIN — DEXTROSE MONOHYDRATE, SODIUM CHLORIDE, AND POTASSIUM CHLORIDE 125 ML/HR: 50; 1.49; 4.5 INJECTION, SOLUTION INTRAVENOUS at 01:43

## 2025-01-01 RX ADMIN — HYDROMORPHONE HYDROCHLORIDE 1 MG: 1 INJECTION, SOLUTION INTRAMUSCULAR; INTRAVENOUS; SUBCUTANEOUS at 15:26

## 2025-01-01 RX ADMIN — SODIUM CHLORIDE AND POTASSIUM CHLORIDE 200 ML/HR: .9; .15 SOLUTION INTRAVENOUS at 22:21

## 2025-01-01 RX ADMIN — HYDROMORPHONE HYDROCHLORIDE 1 MG: 1 INJECTION, SOLUTION INTRAMUSCULAR; INTRAVENOUS; SUBCUTANEOUS at 19:37

## 2025-01-01 RX ADMIN — MUPIROCIN 1 APPLICATION: 20 OINTMENT TOPICAL at 08:18

## 2025-01-01 RX ADMIN — CEFEPIME 2000 MG: 2 INJECTION, POWDER, FOR SOLUTION INTRAVENOUS at 20:01

## 2025-01-01 RX ADMIN — DIAZEPAM 5 MG: 10 INJECTION, SOLUTION INTRAMUSCULAR; INTRAVENOUS at 03:05

## 2025-01-01 RX ADMIN — CEFTRIAXONE SODIUM 1000 MG: 1 INJECTION, POWDER, FOR SOLUTION INTRAMUSCULAR; INTRAVENOUS at 14:44

## 2025-01-01 RX ADMIN — NOREPINEPHRINE BITARTRATE 0.02 MCG/KG/MIN: 0.03 INJECTION, SOLUTION INTRAVENOUS at 21:57

## 2025-01-01 RX ADMIN — DIAZEPAM 5 MG: 10 INJECTION, SOLUTION INTRAMUSCULAR; INTRAVENOUS at 15:25

## 2025-01-01 RX ADMIN — SODIUM CHLORIDE, SODIUM LACTATE, POTASSIUM CHLORIDE, AND CALCIUM CHLORIDE 1000 ML: .6; .31; .03; .02 INJECTION, SOLUTION INTRAVENOUS at 22:57

## 2025-01-01 RX ADMIN — Medication 10 ML: at 01:46

## 2025-01-01 RX ADMIN — ALBUMIN (HUMAN) 1000 ML: 2.5 SOLUTION INTRAVENOUS at 10:21

## 2025-01-01 RX ADMIN — HYDROMORPHONE HYDROCHLORIDE 1 MG: 1 INJECTION, SOLUTION INTRAMUSCULAR; INTRAVENOUS; SUBCUTANEOUS at 05:33

## 2025-01-01 RX ADMIN — DEXTROSE MONOHYDRATE, SODIUM CHLORIDE, AND POTASSIUM CHLORIDE 125 ML/HR: 50; 1.49; 4.5 INJECTION, SOLUTION INTRAVENOUS at 07:05

## 2025-01-01 RX ADMIN — ASPIRIN 300 MG: 300 SUPPOSITORY RECTAL at 04:32

## 2025-01-01 RX ADMIN — DIAZEPAM 5 MG: 10 INJECTION, SOLUTION INTRAMUSCULAR; INTRAVENOUS at 01:32

## 2025-01-01 RX ADMIN — HYDROMORPHONE HYDROCHLORIDE 1 MG: 1 INJECTION, SOLUTION INTRAMUSCULAR; INTRAVENOUS; SUBCUTANEOUS at 23:35

## 2025-01-01 RX ADMIN — SODIUM BICARBONATE 50 MEQ: 84 INJECTION INTRAVENOUS at 20:07

## 2025-01-01 RX ADMIN — PANTOPRAZOLE SODIUM 40 MG: 40 INJECTION, POWDER, FOR SOLUTION INTRAVENOUS at 05:16

## 2025-01-01 RX ADMIN — ALBUMIN (HUMAN) 25 G: 0.25 INJECTION, SOLUTION INTRAVENOUS at 08:41

## 2025-01-01 RX ADMIN — HYDROMORPHONE HYDROCHLORIDE 1 MG: 1 INJECTION, SOLUTION INTRAMUSCULAR; INTRAVENOUS; SUBCUTANEOUS at 01:24

## 2025-01-01 RX ADMIN — ALBUMIN (HUMAN) 25 G: 0.25 INJECTION, SOLUTION INTRAVENOUS at 22:30

## 2025-01-01 RX ADMIN — Medication 10 ML: at 20:04

## 2025-01-01 RX ADMIN — SCOPOLAMINE 1 PATCH: 1.5 PATCH, EXTENDED RELEASE TRANSDERMAL at 21:06

## 2025-01-01 RX ADMIN — HYDROMORPHONE HYDROCHLORIDE 1 MG: 1 INJECTION, SOLUTION INTRAMUSCULAR; INTRAVENOUS; SUBCUTANEOUS at 21:06

## 2025-01-01 RX ADMIN — SODIUM CHLORIDE, POTASSIUM CHLORIDE, SODIUM LACTATE AND CALCIUM CHLORIDE 1497 ML: 600; 310; 30; 20 INJECTION, SOLUTION INTRAVENOUS at 18:38

## 2025-01-01 RX ADMIN — SODIUM CHLORIDE 1000 ML: 9 INJECTION, SOLUTION INTRAVENOUS at 20:29

## 2025-01-01 RX ADMIN — DEXTROSE MONOHYDRATE, SODIUM CHLORIDE, AND POTASSIUM CHLORIDE 125 ML/HR: 50; 1.49; 4.5 INJECTION, SOLUTION INTRAVENOUS at 13:12

## 2025-05-20 PROBLEM — G89.29 CHRONIC LOW BACK PAIN: Status: ACTIVE | Noted: 2025-01-01

## 2025-05-20 PROBLEM — K21.9 GASTROESOPHAGEAL REFLUX DISEASE: Status: ACTIVE | Noted: 2025-01-01

## 2025-05-20 PROBLEM — M31.6 TEMPORAL ARTERITIS: Status: ACTIVE | Noted: 2025-01-01

## 2025-05-20 PROBLEM — I10 BENIGN ESSENTIAL HYPERTENSION: Status: ACTIVE | Noted: 2021-11-30

## 2025-05-20 PROBLEM — E10.9 TYPE 1 DIABETES MELLITUS: Status: ACTIVE | Noted: 2025-01-01

## 2025-05-20 PROBLEM — M51.369 DDD (DEGENERATIVE DISC DISEASE), LUMBAR: Status: ACTIVE | Noted: 2025-01-01

## 2025-05-20 PROBLEM — F41.1 ANXIETY STATE: Status: ACTIVE | Noted: 2021-09-30

## 2025-05-20 PROBLEM — E78.5 HYPERLIPIDEMIA: Status: ACTIVE | Noted: 2025-01-01

## 2025-05-20 PROBLEM — M35.00 SJOGREN'S SYNDROME: Status: ACTIVE | Noted: 2018-12-06

## 2025-05-20 PROBLEM — K52.9 CHRONIC DIARRHEA: Status: ACTIVE | Noted: 2021-09-30

## 2025-05-20 PROBLEM — I10 HYPERTENSION: Status: ACTIVE | Noted: 2025-01-01

## 2025-05-20 PROBLEM — N82.4 COLOVAGINAL FISTULA: Status: ACTIVE | Noted: 2021-12-03

## 2025-05-20 PROBLEM — I21.4 NSTEMI (NON-ST ELEVATED MYOCARDIAL INFARCTION): Status: ACTIVE | Noted: 2025-01-01

## 2025-05-20 PROBLEM — G25.81 RESTLESS LEGS SYNDROME: Status: ACTIVE | Noted: 2021-09-30

## 2025-05-20 PROBLEM — M54.50 CHRONIC LOW BACK PAIN: Status: ACTIVE | Noted: 2025-01-01

## 2025-05-20 PROBLEM — I25.10 ARTERIOSCLEROSIS OF CORONARY ARTERY: Status: ACTIVE | Noted: 2025-01-01

## 2025-05-20 PROBLEM — J44.9 COPD (CHRONIC OBSTRUCTIVE PULMONARY DISEASE): Status: ACTIVE | Noted: 2025-01-01

## 2025-05-20 PROBLEM — E11.10 DKA (DIABETIC KETOACIDOSIS): Status: ACTIVE | Noted: 2025-01-01

## 2025-05-20 PROBLEM — M06.9 RHEUMATOID ARTHRITIS: Status: ACTIVE | Noted: 2021-09-30

## 2025-05-20 NOTE — ED NOTES
Pt brought in via EMS; EMS reports pt family member reported pt has been in bed since Saturday & not been eating. Pt is confused per EMS which is not baseline. Pt applied to monitor, placed in gown, awaiting provider reccomendations

## 2025-05-20 NOTE — Clinical Note
Level of Care: Critical Care [6]   Admitting Physician: ENRRIQUE MCCOY [[id number]]   Attending Physician: ENRRIQUE MCCOY [[id number]]

## 2025-05-20 NOTE — ED PROVIDER NOTES
"Subjective   History of Present Illness  54-year-old female presents from home reportedly has not been out of bed in the last 2 days and was brought in by EMS due to altered mental status.  Reportedly she not been eating or drinking.  She is unable to give any further history or review of systems.  Review of Systems    No past medical history on file.    Allergies   Allergen Reactions    Penicillins Unknown - High Severity       No past surgical history on file.    No family history on file.    Social History     Socioeconomic History    Marital status:        Prior to Admission medications    Not on File     /50   Pulse 85   Temp 92.3 °F (33.5 °C) (Rectal)   Resp 16   Ht 162.6 cm (64\")   Wt 49.9 kg (110 lb)   SpO2 97%   BMI 18.88 kg/m²       Objective   Physical Exam  General: Ill-appearing thin female awake and moaning  Eyes: Pupils midsized round and reactive, sclera nonicteric  HEENT: Mucous membranes very dry, no mucosal swelling, normocephalic, atraumatic  Neck: Supple, no nuchal rigidity, no JVD  Respirations: Respirations nonlabored, equal breath sounds bilaterally, clear lungs  Heart regular rate and rhythm, no murmurs rubs or gallops,   Abdomen soft nontender nondistended, no hepatosplenomegaly, no hernia, no mass, normal bowel sounds, no CVA tenderness  Extremities no clubbing cyanosis or edema, calves are symmetric and nontender  Neuro cranial nerves grossly intact, seems to move all extremities equally though generally weak and not following commands appropriately  Psych oriented to person only  Skin no rash  Procedures           ED Course      Results for orders placed or performed during the hospital encounter of 05/20/25   POC Glucose Once    Collection Time: 05/20/25  6:03 PM    Specimen: Blood   Result Value Ref Range    Glucose 388 (H) 70 - 105 mg/dL   ECG 12 Lead Altered Mental Status    Collection Time: 05/20/25  6:08 PM   Result Value Ref Range    QT Interval 441 ms    QTC " Interval 509 ms   POC Lactate    Collection Time: 05/20/25  6:12 PM    Specimen: Blood   Result Value Ref Range    Lactate 1.4 0.3 - 2.0 mmol/L   Osmolality, Serum    Collection Time: 05/20/25  6:18 PM    Specimen: Blood   Result Value Ref Range    Osmolality 333 (H) 275 - 295 mOsm/kg   Gold Top - SST    Collection Time: 05/20/25  6:18 PM   Result Value Ref Range    Extra Tube Hold for add-ons.    Comprehensive Metabolic Panel    Collection Time: 05/20/25  6:20 PM    Specimen: Blood   Result Value Ref Range    Glucose 397 (H) 65 - 99 mg/dL    BUN 18 6 - 20 mg/dL    Creatinine 2.27 (H) 0.57 - 1.00 mg/dL    Sodium 138 136 - 145 mmol/L    Potassium 4.8 3.5 - 5.2 mmol/L    Chloride 98 98 - 107 mmol/L    CO2 3.4 (C) 22.0 - 29.0 mmol/L    Calcium 8.2 (L) 8.6 - 10.5 mg/dL    Total Protein 5.1 (L) 6.0 - 8.5 g/dL    Albumin 2.2 (L) 3.5 - 5.2 g/dL    ALT (SGPT) 18 1 - 33 U/L    AST (SGOT) 23 1 - 32 U/L    Alkaline Phosphatase 359 (H) 39 - 117 U/L    Total Bilirubin 0.2 0.0 - 1.2 mg/dL    Globulin 2.9 gm/dL    A/G Ratio 0.8 g/dL    BUN/Creatinine Ratio 7.9 7.0 - 25.0    Anion Gap 36.6 (H) 5.0 - 15.0 mmol/L    eGFR 25.2 (L) >60.0 mL/min/1.73   Protime-INR    Collection Time: 05/20/25  6:20 PM    Specimen: Blood   Result Value Ref Range    Protime 15.4 (H) 11.7 - 14.2 Seconds    INR 1.22 (H) 0.90 - 1.10   aPTT    Collection Time: 05/20/25  6:20 PM    Specimen: Blood   Result Value Ref Range    PTT 28.0 22.7 - 35.4 seconds   Lipase    Collection Time: 05/20/25  6:20 PM    Specimen: Blood   Result Value Ref Range    Lipase 21 13 - 60 U/L   High Sensitivity Troponin T    Collection Time: 05/20/25  6:20 PM    Specimen: Blood   Result Value Ref Range    HS Troponin T 95 (C) <14 ng/L   Procalcitonin    Collection Time: 05/20/25  6:20 PM    Specimen: Blood   Result Value Ref Range    Procalcitonin 1.17 (H) 0.00 - 0.25 ng/mL   Ethanol    Collection Time: 05/20/25  6:20 PM    Specimen: Blood   Result Value Ref Range    Ethanol % <0.010 %    Magnesium    Collection Time: 05/20/25  6:20 PM    Specimen: Blood   Result Value Ref Range    Magnesium 2.3 1.6 - 2.6 mg/dL   TSH Rfx On Abnormal To Free T4    Collection Time: 05/20/25  6:20 PM    Specimen: Blood   Result Value Ref Range    TSH 2.890 0.270 - 4.200 uIU/mL   Ammonia    Collection Time: 05/20/25  6:20 PM    Specimen: Blood   Result Value Ref Range    Ammonia 44 11 - 51 umol/L   CK    Collection Time: 05/20/25  6:20 PM    Specimen: Blood   Result Value Ref Range    Creatine Kinase 269 (H) 20 - 180 U/L   CBC Auto Differential    Collection Time: 05/20/25  6:20 PM    Specimen: Blood   Result Value Ref Range    WBC 22.38 (H) 3.40 - 10.80 10*3/mm3    RBC 3.23 (L) 3.77 - 5.28 10*6/mm3    Hemoglobin 9.1 (L) 12.0 - 15.9 g/dL    Hematocrit 32.9 (L) 34.0 - 46.6 %    .9 (H) 79.0 - 97.0 fL    MCH 28.2 26.6 - 33.0 pg    MCHC 27.7 (L) 31.5 - 35.7 g/dL    RDW 15.7 (H) 12.3 - 15.4 %    RDW-SD 58.8 (H) 37.0 - 54.0 fl    MPV 12.0 6.0 - 12.0 fL    Platelets 364 140 - 450 10*3/mm3    Neutrophil % 86.3 (H) 42.7 - 76.0 %    Lymphocyte % 8.3 (L) 19.6 - 45.3 %    Monocyte % 2.6 (L) 5.0 - 12.0 %    Eosinophil % 0.1 (L) 0.3 - 6.2 %    Basophil % 0.1 0.0 - 1.5 %    Immature Grans % 2.6 (H) 0.0 - 0.5 %    Neutrophils, Absolute 19.30 (H) 1.70 - 7.00 10*3/mm3    Lymphocytes, Absolute 1.85 0.70 - 3.10 10*3/mm3    Monocytes, Absolute 0.58 0.10 - 0.90 10*3/mm3    Eosinophils, Absolute 0.03 0.00 - 0.40 10*3/mm3    Basophils, Absolute 0.03 0.00 - 0.20 10*3/mm3    Immature Grans, Absolute 0.59 (H) 0.00 - 0.05 10*3/mm3    nRBC 0.6 (H) 0.0 - 0.2 /100 WBC   Acetone    Collection Time: 05/20/25  6:20 PM    Specimen: Blood   Result Value Ref Range    Acetone Moderate (A) Negative   Phosphorus    Collection Time: 05/20/25  6:20 PM    Specimen: Blood   Result Value Ref Range    Phosphorus 6.6 (H) 2.5 - 4.5 mg/dL   Urinalysis With Culture If Indicated - Straight Cath    Collection Time: 05/20/25  6:36 PM    Specimen: Straight Cath;  Urine   Result Value Ref Range    Color, UA Yellow Yellow, Straw    Appearance, UA Clear Clear    pH, UA 5.5 5.0 - 8.0    Specific Gravity, UA 1.015 1.005 - 1.030    Glucose,  mg/dL (Trace) (A) Negative    Ketones, UA 15 mg/dL (1+) (A) Negative    Bilirubin, UA Negative Negative    Blood, UA Negative Negative    Protein, UA Trace (A) Negative    Leuk Esterase, UA Negative Negative    Nitrite, UA Negative Negative    Urobilinogen, UA 0.2 E.U./dL 0.2 - 1.0 E.U./dL   Urine Drug Screen - Urine, Clean Catch    Collection Time: 05/20/25  6:39 PM    Specimen: Urine, Clean Catch   Result Value Ref Range    THC, Screen, Urine Negative Negative    Phencyclidine (PCP), Urine Negative Negative    Cocaine Screen, Urine Negative Negative    Methamphetamine, Ur Negative Negative    Opiate Screen Negative Negative    Amphetamine Screen, Urine Negative Negative    Benzodiazepine Screen, Urine Negative Negative    Tricyclic Antidepressants Screen Negative Negative    Methadone Screen, Urine Negative Negative    Barbiturates Screen, Urine Negative Negative    Oxycodone Screen, Urine Positive (A) Negative    Buprenorphine, Screen, Urine Negative Negative   Carbon Monoxide, Blood    Collection Time: 05/20/25  6:40 PM    Specimen: Blood   Result Value Ref Range    Carbon Monoxide, Blood 2.6 0 - 3 %   Type & Screen    Collection Time: 05/20/25  6:40 PM    Specimen: Blood   Result Value Ref Range    ABO Type A     RH type Positive     Antibody Screen Negative     T&S Expiration Date 5/23/2025 11:59:59 PM    Blood Gas, Arterial -    Collection Time: 05/20/25  7:08 PM    Specimen: Arterial Blood   Result Value Ref Range    Site Right Radial     Curtis's Test Positive     pH, Arterial 6.988 (C) 7.350 - 7.450 pH units    pCO2, Arterial 7.5 (C) 35.0 - 48.0 mm Hg    pO2, Arterial 120.6 (H) 83.0 - 108.0 mm Hg    HCO3, Arterial 1.8 (L) 21.0 - 28.0 mmol/L    Base Excess, Arterial -27.5 (L) 0.0 - 3.0 mmol/L    O2 Saturation, Arterial 96.0 94.0  - 98.0 %    CO2 Content <5.0 (L) 22 - 29 mmol/L    Barometric Pressure for Blood Gas      Modality Room Air     FIO2 21 %    Notified Who      Read Back      Notified Time      Hemodilution No     PO2/FIO2 574 (H) 0 - 500   High Sensitivity Troponin T 1Hr    Collection Time: 05/20/25  8:03 PM    Specimen: Blood   Result Value Ref Range    HS Troponin T 88 (C) <14 ng/L    Troponin T Numeric Delta -7 ng/L    Troponin T % Delta -7 Abnormal if >/= 20%   Basic Metabolic Panel    Collection Time: 05/20/25  8:03 PM    Specimen: Blood   Result Value Ref Range    Glucose 377 (H) 65 - 99 mg/dL    BUN 17 6 - 20 mg/dL    Creatinine 2.12 (H) 0.57 - 1.00 mg/dL    Sodium 141 136 - 145 mmol/L    Potassium 4.7 3.5 - 5.2 mmol/L    Chloride 102 98 - 107 mmol/L    CO2 3.1 (C) 22.0 - 29.0 mmol/L    Calcium 7.7 (L) 8.6 - 10.5 mg/dL    BUN/Creatinine Ratio 8.0 7.0 - 25.0    Anion Gap 35.9 (H) 5.0 - 15.0 mmol/L    eGFR 27.4 (L) >60.0 mL/min/1.73   Magnesium    Collection Time: 05/20/25  8:03 PM    Specimen: Blood   Result Value Ref Range    Magnesium 2.2 1.6 - 2.6 mg/dL   Phosphorus    Collection Time: 05/20/25  8:03 PM    Specimen: Blood   Result Value Ref Range    Phosphorus 6.2 (H) 2.5 - 4.5 mg/dL   POC Glucose Once    Collection Time: 05/20/25  8:11 PM    Specimen: Blood   Result Value Ref Range    Glucose 383 (H) 70 - 105 mg/dL     CT Head Without Contrast  Result Date: 5/20/2025  Impression: New 5 cm x 2 cm area of low-attenuation in the left PCA distribution suspicious for an area of infarction. No evidence of acute hemorrhage or midline shift. Electronically Signed: Markos Holland MD  5/20/2025 8:48 PM EDT  Workstation ID: [id number]    XR Chest 1 View  Result Date: 5/20/2025  Mild bibasal infiltrates. Electronically Signed: Markos Holland MD  5/20/2025 7:38 PM EDT  Workstation ID: [id number]                                                     Medical Decision Making  Patient presents with altered mental status and hypothermic from home.   Differential diagnose including DKA, CVA, sepsis, metabolic encephalopathy, drug reaction, intoxication, rhabdomyolysis    High-sensitivity troponins are elevated I suspect this is more related to the metabolic strain and acute kidney injury as opposed to ischemia as there is no reports of chest pain from home and EKG showing no acute ischemic abnormality.  She was hypothermic and placed under a rewarming blanket.  DKA orders were initiated including IV insulin infusion.  She was ordered bicarb for her severe acidemia.  She was ordered 30 cc/kg IV fluid bolus for sepsis.  Following sepsis bolus she had some soft blood pressures at times with mean below 65 and was ordered additional IV fluids as well as IV norepinephrine for pressure support.  Cefepime and vancomycin were ordered for unknown source sepsis.  Mental status remained stable and altered during the emergency room course.  CT was suggestive of interval infarct without hemorrhage.  Notably she was not a thrombolytic candidate or candidate for intravascular intervention as her mental status change and illness has been going on for at least 2 days.  Case and findings discussed with Juliana with the ICU team for admission to the ICU for further care.    Critical care time 50 minutes        Problems Addressed:  Dehydration: complicated acute illness or injury  Diabetic ketoacidosis with coma associated with type 1 diabetes mellitus: complicated acute illness or injury    Amount and/or Complexity of Data Reviewed  Labs: ordered. Decision-making details documented in ED Course.     Details: Blood gas shows severe acidemia and comprehensive metabolic panel shows elevated glucose as well as gap acidosis suggestive of severe DKA.  Lactate normal, osmolality elevated, the drug screen positive for oxycodone, CBC shows leukocytosis and anemia  Radiology: ordered and independent interpretation performed.     Details: My independent interpretation of chest x-ray image some  mild basilar opacities favored atelectasis, possible infiltrate  CT scan suggest an area of left parietal infarction, no previous for comparison, no hemorrhage  ECG/medicine tests: ordered and independent interpretation performed.     Details: My EKG interpretation sinus rhythm rate of 80, no acute ST or T wave abnormality    Risk  OTC drugs.  Prescription drug management.  Decision regarding hospitalization.        Final diagnoses:   Diabetic ketoacidosis with coma associated with type 1 diabetes mellitus   Dehydration       ED Disposition  ED Disposition       ED Disposition   Decision to Admit    Condition   --    Comment   Level of Care: Critical Care [6]   Diagnosis: DKA (diabetic ketoacidosis) [698775]   Certification: I Certify That Inpatient Hospital Services Are Medically Necessary For Greater Than 2 Midnights                 No follow-up provider specified.       Medication List      No changes were made to your prescriptions during this visit.            Rduy Mcelroy MD  05/20/25 2054

## 2025-05-21 PROBLEM — Z51.5 END OF LIFE CARE: Status: ACTIVE | Noted: 2025-01-01

## 2025-05-21 NOTE — H&P
"[facility] Medicine Services  History & Physical    Patient Name: Sheryl Stuart  : 1970  MRN: [id number]  Primary Care Physician:  Berenice London MD  Date of admission: 2025  Date and Time of Service: 2025 at 1715    Subjective      Chief Complaint: Readmit for comfort care    History of Present Illness: Sheryl Stuart is a 54 y.o. female with a CMH of type 1 diabetes mellitus, non-STEMI, hypertension, GERD, chronic pain, COPD, and anxiety presented to the hospital for confusion. Per ICU provider hospital course on discharge summary, \"family stated that the patient had been in bed and not eating or drinking x4 days. In the ED the patient was able to state her name only and follow some simple commands. Labs remarkable for ABG 6.988/7.5/120/1.8, , troponin 95, repeat troponin 88, anion gap 36.6, creatinine 2.27, glucose 397, calcium 8.2, phosphorus 6.6, alkaline phosphatase 359, albumin 2.2, hemoglobin A1c 10.3, osmolality 333, procalcitonin 1.17, PT 15.4, INR 1.22, WBC 22.38, hemoglobin 9.1.  UA showed trace amount of glucose, ketonuria, trace proteinuria.  UDS was positive for oxycodone which the patient is prescribed.  She received 2 A of bicarb, a full sepsis bolus, 1 additional liter IVF, cefepime, vancomycin, and was started on norepinephrine for persistent hypotension.  CT head showed a new 5 cm x 2 cm area of low-attenuation in the left PCA distribution suspicious for an area of infarction. No evidence of acute hemorrhage or midline shift. Her last known well time was days ago. Neurology consulted. CT abdomen/pelvis pending. CXR no infiltrates or effusions.      She was admitted to the ICU with a principal diagnosis of DKA (diabetic ketoacidosis). On , patient afebrile, VSS.  A&Ox4.  Has only had 700 mL of UOP since admission, net +4.6 L.  Cr at baseline.  CO2 up to 14, AG remains 21.  Hb 5.7--transfused and came up to 8.  Pt has elected that she does not wish " "to continue any further treatment and has asked for hospice.  Pt's daughter agreed with this when RN spoke with her on the phone.  Will likely go home with hospice.  Continue current treatment measures for now; will not escalate care.  Patient did not pass her bedside swallow evaluation, and when given option to proceed with a video swallow, decided against proceeding with this test.  She stated that she would prefer to go comfort measures.  She was seen and evaluated by neurology.     Patient was accepted for transition to [facility] with [facility].  Patient will be readmitted to the hospitalist team.  Comfort orders in place\".    Patient was seen and evaluated at the bedside, no family present. Patient appears comfortable at this time. Patient did not arouse to voice, resting comfortably.       Review of Systems   Unable to perform ROS: Acuity of condition       Personal History     No past medical history on file.    No past surgical history on file.    Family History: family history is not on file. Otherwise pertinent FHx was reviewed and not pertinent to current issue.    Social History:      Home Medications:  Prior to Admission Medications       Prescriptions Last Dose Informant Patient Reported? Taking?    aspirin 81 MG EC tablet   Yes No    Take 1 tablet by mouth Daily.    carbidopa-levodopa (SINEMET)  MG per tablet   Yes No    Take 1 tablet by mouth 2 (Two) Times a Day.    carvedilol (COREG) 12.5 MG tablet   Yes No    Take 1 tablet by mouth 2 (Two) Times a Day With Meals.    doxycycline (VIBRAMYICN) 100 MG tablet   Yes No    Take 1 tablet by mouth 2 (Two) Times a Day.    loperamide (IMODIUM) 2 MG capsule   Yes No    Take 1 capsule by mouth 4 (Four) Times a Day As Needed for Diarrhea.    omeprazole (priLOSEC) 40 MG capsule   Yes No    Take 1 capsule by mouth Daily.    ondansetron (ZOFRAN) 4 MG tablet   Yes No    Take 1 tablet by mouth Every 8 (Eight) Hours As Needed for Nausea or Vomiting.    " oxyCODONE-acetaminophen (PERCOCET)  MG per tablet   Yes No    Take 1 tablet by mouth Every 6 (Six) Hours As Needed for Moderate Pain.    POTASSIUM GLUCONATE PO   Yes No    Take 650 mg by mouth Daily.    vitamin D3 125 MCG (5000 UT) capsule capsule   Yes No    Take 1 capsule by mouth Daily.              Allergies:  Allergies   Allergen Reactions    Penicillins Unknown - High Severity       Objective      Vitals:   Temp:  [92.3 °F (33.5 °C)-98.6 °F (37 °C)] 95.2 °F (35.1 °C)  Heart Rate:  [] 80  Resp:  [12-22] 22  BP: ()/(42-89) 96/56  There is no height or weight on file to calculate BMI.  Physical Exam  General: 53 yo female however appears older than stated age, well nourished, no acute distress.  HENT: Normocephalic  Neck: Supple, nontender, no carotid bruits, no JVD, no LAD.  Lungs:nonlabored respiration.  Heart: RRR.  Skin: Skin is warm, dry and pink, no rashes or lesions.      Diagnostic Data:  Lab Results (last 24 hours)       ** No results found for the last 24 hours. **             Imaging Results (Last 24 Hours)       ** No results found for the last 24 hours. **              Assessment & Plan        This is a 54 y.o. female with:    Active and Resolved Problems  Active Hospital Problems    Diagnosis  POA    **End of life care [Z51.5]  Not Applicable      Resolved Hospital Problems   No resolved problems to display.       End of life care  Under [facility] hospice with [facility]  Comfort medications ordered PRN including valium, robinul, dilaudid  Labs discontinued  All care is to be geared toward comfort at this time  Diet as tolerated if aligns with comfort      VTE Prophylaxis:  No VTE prophylaxis order currently exists.        The patient desires to be as follows:    CODE STATUS:    Code Status (Patient has no pulse and is not breathing): No CPR (Do Not Attempt to Resuscitate)  Medical Interventions (Patient has pulse or is breathing): Comfort Measures  Level Of Support Discussed With:  Next of Kin (If No Surrogate)        Gladis Snyder, who can be contacted at [phone], is the designated person to make medical decisions on the patient's behalf if She is incapable of doing so. This was clarified with patient and/or next of kin on 5/21/2025 during the course of this H&P.    Admission Status:  I believe this patient meets inpatient status.    Expected Length of Stay: >2 midnights    PDMP and Medication Dispenses via Sidebar reviewed and consistent with patient reported medications.    I discussed the patient's findings and my recommendations with patient, family, and nursing staff.      Signature:     This document has been electronically signed by ERICA Tijerina on May 21, 2025 17:09 EDT   [facility] Hospitalist Team

## 2025-05-21 NOTE — CONSULTS
Diabetes Education    Patient Name:  Sheryl Stuart  YOB: 1970  MRN: [id number]  Admit Date:  5/20/2025        MD consult for DKA and stroke protocol, admission blood sugar 388, and on 5/20/2025. Per bedside nurse patient is comfort care only and is in Hospice care. Consult no longer appropriate for patient.       Electronically signed by:  Gladis Schuler RN  05/21/25 16:56 EDT

## 2025-05-21 NOTE — PROCEDURES
"Insert Central Line At Bedside    Date/Time: 5/20/2025 11:28 PM    Performed by: Juliana Fletcher APRN  Authorized by: Juliana Fletcher APRN  Consent: Written consent obtained  Consent given by: daughter.  Procedure consent: procedure consent matches procedure scheduled  Required items: required blood products, implants, devices, and special equipment available  Patient identity confirmed: verbally with patient, arm band and hospital-assigned identification number  Time out: Immediately prior to procedure a \"time out\" was called to verify the correct patient, procedure, equipment, support staff and site/side marked as required.  Indications: vascular access  Anesthesia: local infiltration    Anesthesia:  Local Anesthetic: lidocaine 2% without epinephrine  Anesthetic total: 5 mL  Preparation: skin prepped with ChloraPrep  Skin prep agent dried: skin prep agent completely dried prior to procedure  Sterile barriers: all five maximum sterile barriers used - cap, mask, sterile gown, sterile gloves, and large sterile sheet  Hand hygiene: hand hygiene performed prior to central venous catheter insertion  Location details: right internal jugular  Patient position: flat  Catheter type: triple lumen  Catheter size: 7 Fr  Ultrasound guidance: yes  Sterile ultrasound techniques: sterile gel and sterile probe covers were used  Number of attempts: 1  Successful placement: yes  Post-procedure: line sutured and dressing applied  Assessment: blood return through all ports, free fluid flow, placement verified by x-ray and no pneumothorax on x-ray  Patient tolerance: patient tolerated the procedure well with no immediate complications        Electronically signed by ERICA Valdez, 05/21/25, 4:31 AM EDT.    "

## 2025-05-21 NOTE — PLAN OF CARE
Goal Outcome Evaluation:   Hgb has improved this pm to 8.3, however per RN, may desire hospice. Will hold and f/u pending patient's goals of care.

## 2025-05-21 NOTE — PLAN OF CARE
Goal Outcome Evaluation:      5/21/2025   OTHER   Discipline Speech-Language Pathologist   Rehab Time/Intention   Session Not Performed Unable to treat ; ST currently following for dysphagia and speech evaluation. Attempted to work with pt however pt refused to cooperate/take any PO trials. Discussed NPO status with pt and that ST would only be able to see her again tomorrow morning, pt stated she wouldn't take anything now or tomorrow. ST will re-attempt on next date of service. Nursing updated.

## 2025-05-21 NOTE — CASE MANAGEMENT/SOCIAL WORK
Discharge Planning Assessment   Sudarshan     Patient Name: Sheryl Stuart  MRN: [id number]  Today's Date: 5/21/2025    Admit Date: 5/20/2025    Plan: Plan to dc home with SO, PT/OT/SLP uriel pending.   Discharge Needs Assessment       Row Name 05/21/25 1023       Living Environment    People in Home significant other    Name(s) of People in Home [place] - SO (ex-spouse)    Current Living Arrangements home    Potentially Unsafe Housing Conditions none    In the past 12 months has the electric, gas, oil, or water company threatened to shut off services in your home? No    Primary Care Provided by self    Provides Primary Care For no one    Family Caregiver if Needed significant other    Family Caregiver Names [place] - SO (ex-spouse)    Quality of Family Relationships helpful;involved;supportive    Able to Return to Prior Arrangements yes       Resource/Environmental Concerns    Resource/Environmental Concerns none    Transportation Concerns none       Transportation Needs    In the past 12 months, has lack of transportation kept you from medical appointments or from getting medications? no    In the past 12 months, has lack of transportation kept you from meetings, work, or from getting things needed for daily living? No       Food Insecurity    Within the past 12 months, you worried that your food would run out before you got the money to buy more. Never true    Within the past 12 months, the food you bought just didn't last and you didn't have money to get more. Never true       Transition Planning    Patient/Family Anticipates Transition to home with family    Patient/Family Anticipated Services at Transition none    Transportation Anticipated car, drives self;family or friend will provide       Discharge Needs Assessment    Readmission Within the Last 30 Days no previous admission in last 30 days    Equipment Currently Used at Home walker, rolling    Concerns to be Addressed discharge planning    Anticipated  Changes Related to Illness none    Equipment Needed After Discharge none                   Discharge Plan       Row Name 05/21/25 1024       Plan    Plan Plan to dc home with SO, PT/OT/SLP eval pending.    Patient/Family in Agreement with Plan yes    Plan Comments CM met with patient at bedside. Patient A&Ox4, drowsy. Patient lives at home with SO who will transport at discharge. Patient performs ADLs, uses a RW, does not drive. PCP and pharmacy confirmed. Agreeable to M2B.  Denies financial assistance needs for medication and/or food. Denies any current HH, Caregiver, or rehab services. DC Barriers: PT/OT/SLP evals pending, NPO, RIJ line, FC, IV ABxs, Neuro checks, monitor H&H, HGB 5.7- PRBCs, Insulin gtt, DM ed/Neuro following.                    Expected Discharge Date and Time       Expected Discharge Date Expected Discharge Time    May 23, 2025            Demographic Summary       Row Name 05/21/25 1019       General Information    Admission Type inpatient    Arrived From emergency department    Required Notices Provided Important Message from Medicare    Referral Source admission list    Reason for Consult discharge planning    Preferred Language English                   Functional Status       Row Name 05/21/25 1019       Functional Status    Usual Activity Tolerance moderate    Current Activity Tolerance fair       Functional Status, IADL    Medications independent    Meal Preparation independent    Housekeeping independent    Laundry independent    Shopping assistive equipment and person       Mental Status    General Appearance WDL WDL       Mental Status Summary    Recent Changes in Mental Status/Cognitive Functioning no changes             ANTHONY Rios RN  ICU/CVU   O: [phone]  C: [phone]  [email or web address]

## 2025-05-21 NOTE — NURSING NOTE
54F admitted 5/20 for DKA. Hx - DM, HTN, COPD, chronic pain. Hospice consult in place.   Consulted for wounds on on coccyx, L hip. Per patient the scattered abrasions on the back and left hip are related to her diabetes, they do hurt and she has scratched/picked at them.  Recommend cleansing and leaving back abrasions JULIET, cover L hip abrasions with silicone foam border dressing after cleansing.  Unable to fully assess due to patient declining to turn secondary to pain, nausea, not being able to catch her breath. Primary RN at bedside during this and stated patient has not been wanting to turn, and when turned she  adjusts herself off the wedge that was placed.     R buttock/sacral wound - based on photo and primary RN report it is consistent with US pressure injury, POA with eschar covering the wound bed. Unable to assess if skin surrounding is blanchable, patient is incontinent and could be experiencing MASD.   Recommend cleansing with saline, apply exuderm hydrocolloid to the wound and change every 3rd day. Gently remove when changing. If it is not staying on recommend re consulting wound care. Recommend applying moisture barrier on skin not covered by exuderm.     Recommend continued pressure injury prevention measures.

## 2025-05-21 NOTE — DISCHARGE PLACEMENT REQUEST
"Sheryl Stuart (54 y.o. Female)       Date of Birth   1970    Social Security Number       Address   [address] [place] [address] [place] [facility]    Home Phone   [phone]    MRN   [id number]       Adventism   None    Marital Status                               Admission Date   5/20/2025    Admission Type   Emergency    Admitting Provider   Ladarius Lester DO    Attending Provider   Ladarius Lester DO    Department, Room/Bed   [facility], 2302/1       Discharge Date       Discharge Disposition       Discharge Destination                                 Attending Provider: Ladarius Lester DO    Allergies: Penicillins    Isolation: None   Infection: None   Code Status: No CPR    Ht: 162.6 cm (64\")   Wt: 54.2 kg (119 lb 7.8 oz)    Admission Cmt: None   Principal Problem: DKA (diabetic ketoacidosis) [E11.10]                   Active Insurance as of 5/20/2025       Primary Coverage       Payor Plan Insurance Group Employer/Plan Group    [employer] [place] [employer] [id number]       Payor Plan Address Payor Plan Phone Number Payor Plan Fax Number Effective Dates    MAIL STOP:   4/1/2017 - None Entered    [address] [address]       [facility] [id number]         Subscriber Name Subscriber Birth Date Member ID       SHERYL STUART TRE 1970 [id number]                     Emergency Contacts        (Rel.) Home Phone Work Phone Mobile Phone    shawn stuart (Significant Other) -- -- [phone]                "

## 2025-05-21 NOTE — CASE MANAGEMENT/SOCIAL WORK
Continued Stay Note   Sudarshan     Patient Name: Sheryl Stuart  MRN: [id number]  Today's Date: 5/21/2025    Admit Date: 5/20/2025    Plan: Plan to dc home with SO, PT/OT/SLP eval pending.   Discharge Plan       Row Name 05/21/25 1314       Plan    Plan Comments MD spoke with patient and daughter, agreeable to [facility], referral in basket, liaison notified, pending acceptance. Message sent to [facility] updating.      Row Name 05/21/25 1024       Plan    Plan Plan to dc home with SO, PT/OT/SLP eval pending.    Patient/Family in Agreement with Plan yes    Plan Comments CM met with patient at bedside. Patient A&Ox4, drowsy. Patient lives at home with SO who will transport at discharge. Patient performs ADLs, uses a RW, does not drive. PCP and pharmacy confirmed. Agreeable to M2B.  Denies financial assistance needs for medication and/or food. Denies any current HH, Caregiver, or rehab services. DC Barriers: PT/OT/SLP evals pending, NPO, RIJ line, FC, IV ABxs, Neuro checks, monitor H&H, HGB 5.7- PRBCs, Insulin gtt, DM ed/Neuro following.               Expected Discharge Date and Time       Expected Discharge Date Expected Discharge Time    May 23, 2025               ANTHONY Rios RN  ICU/CVU   O: [phone]  C: [phone]  [email or web address]

## 2025-05-21 NOTE — SIGNIFICANT NOTE
05/21/25 1105   OTHER   Discipline occupational therapist;physical therapist   Rehab Time/Intention   Session Not Performed other (see comments)  (hbg 5.7, will follow up as able)   Recommendation   PT - Next Appointment 05/22/25   Recommendation   OT - Next Appointment 05/22/25

## 2025-05-21 NOTE — CONSULTS
Primary Care Provider: Provider, No Known     Consult requested by: ICU team    Reason for Consultation: Neurological evaluation /abnormal head CT    History taken from: chart RN    Chief complaint: DKA       SUBJECTIVE:    History of present illness: Background per H&P: Sheryl Stuart is a 54 y.o. female who was evaluated in room  at [facility]    The patient's nurse was present    Source of information is mostly the medical records    Vital signs demonstrated some hypotension, as low as 77/48  Random glucose now 160, creatinine 2.06  Calcium 7.3  Last H&H was 8.3 and 26.5    The H&H dropped to as low as 5.7 and 18.6  When she came in her white count was 22.38, RBC count 3.23 and H&H 9.1 and 32.9 with platelet count of 364 she had moderate acetone phosphorus 6.6 hemoglobin A1c 10.30 serum Osmo 333 abnormal ABGs    Her CT head was abnormal showing age-indeterminate infarct which looks more MCA branch to me,        Impression:   New 5 cm x 2 cm area of low-attenuation in the left PCA distribution suspicious for an area of infarction. No evidence of acute hemorrhage or midline shift.       Electronically Signed: Markos Holland MD    2025 8:48 PM EDT    Workstation ID: [id number]       Once I got the report, we had no last known well so my request was to get MRI brain and CTA head and neck or MRA head and neck depending upon which will be possible when we can but the patient has been too sick to go for any testing and her renal functions are bad    She is confused     Because of her confusion it is very questionable if she has any field cuts she has significant lower extremity weakness    But that is a possibility    It looks like she is on aspirin at home    We have no other imaging studies to compare    As per admitting,  Sheryl Stuart : 1970 MRN:[id number] LOS:1 ROOM:       Reason for admission: DKA (diabetic ketoacidosis)      Assessment / Plan      Diabetic ketoacidosis  without coma, with history of diabetes mellitus, type 1  Metabolic acidosis  Etiology unclear  Anion gap of 36.6 on admission.  A1c 10.30  ABG 6.988/7 point 5/120/1.8  2 amps of sodium bicarbonate in ED  DKA protocol initiated  Insulin drip initiated per DKA protocol  Adjust iv fluids based on glucose, sodium, and potassium per protocol  Accu-Cheks every hour and serial labs as ordered  Continue protocol until resolved per protocol recommendations  Patient received a total of 3,497mL IVF boluses     Suspected acute ischemic stroke  Not a candidate for TNK d/t last know well time > 4.5 hours ago, LKW time was over 2-3 days ago  Ischemic Stroke orders initiated.  CT scan reviewed: New 5 cm x 2 cm area of low-attenuation in the left PCA distribution suspicious for an area of infarction. No evidence of acute hemorrhage or midline shift   ECHO with bubble study ordered    Check LDL and A1c.  Start moderate/high intensity statins  Neurology consulted, will see patient in the AM, did not want additional scans tonight  Observe for signs and symptoms of bleeding, if observed, immediately notify Neurology  NPO per Code Stroke Protocol, may advance diet if patient passes nursing bedside swallow evaluation  PT/ST/OT evaluation & treatment     Shock, hypovolemia vs septic ( WBC>12 or <4 or >10% bands, Temp > 100.4 or < 96.8, HR>90, Creatinine >2, Change in mental status, and MAP<65 or SBP<90 )  Hypoalbuminemia  Unclear etiology, will rule out infectious source  Patient is in DKA and reportedly has not been eating or drinking for at least 2 days  WBC 22.38, no bandemia, lactate 1.4, procalcitonin 1.17  Will do blood / urine / sputum cultures  CXR no infiltrates or effusions  CT abdomen/pelvis: Patchy airspace opacity in the lung bases possibly secondary to atelectasis or pneumonia. Generalized anasarca. There appear to be areas of wall thickening throughout the large and small bowel possibly secondary to enterocolitis. Ischemia  is felt to be less likely given the diffuse appearing nature   GI PCR ordered  Started on cefepime and vancomycin  S/p sepsis bolus, plus 2 additional liters IVF, totaling 3497mL, persistent hypotension in spite of adequate fluid resuscitation  25g/25% albumin ordered q6h x3 doses  C/w additional fluids as needed. Avoid fluid overload.   On levophed, titrate vasopressors for a target MAP of 65.      Acute Kidney Injury  Elevated CK  Remains nonoliguric. Baseline creatinine 0.9-1  Creatinine 2.27 on admit  CT: Punctate bilateral nonobstructing renal calcifications.   Likely prerenal. Possible intrinsic component due to ATN from hypotension and potential infection  C/w IV fluids as ordered  Monitor Input/Output very closely  Avoids NSAIDs, nephrotoxic medications, and hypotension  Net IO Since Admission: 100 mL [05/21/25 0035]      Hypothermia  Hypothermic on admission, as low as 92.3F  Rebel hugger as need to achieve normothermia  TSH WNL 2.89     Hypocalcemia: 7.7, ionized calcium ordered, replace as needed     COPD: prn duonebs, on 2L nasal cannula, wean as tolerated, not hypoxemic  Hyperlipidemia: lipid panel in AM, patient does not appear to be on a statin per fill history  GERD: PPI  Chronic pain: continue prn oxycodone when appropriate  Hx NSTEMI, s/p PCI  Hx chronic diarrhea     Nutrition:   NPO Diet NPO Type: Strict NPO      VTE Prophylaxis:  Mechanical VTE prophylaxis orders are present.           History of Present illness      Sheryl Stuart is a 54 y.o. female with PMH of type 1 diabetes mellitus, non-STEMI, hypertension, GERD, chronic pain, COPD, and anxiety presented to the hospital for confusion.  Per report, family stated that the patient had been in bed and not eating or drinking x4 days. In the ED the patient was able to state her name only and follow some simple commands. Labs remarkable for ABG 6.988/7.5/120/1.8, , troponin 95, repeat troponin 88, anion gap 36.6, creatinine 2.27, glucose  397, calcium 8.2, phosphorus 6.6, alkaline phosphatase 359, albumin 2.2, hemoglobin A1c 10.3, osmolality 333, procalcitonin 1.17, PT 15.4, INR 1.22, WBC 22.38, hemoglobin 9.1.  UA showed trace amount of glucose, ketonuria, trace proteinuria.  UDS was positive for oxycodone which the patient is prescribed.  She received 2 A of bicarb, a full sepsis bolus, 1 additional liter IVF, cefepime, vancomycin, and was started on norepinephrine for persistent hypotension.  CT head showed a new 5 cm x 2 cm area of low-attenuation in the left PCA distribution suspicious for an area of infarction. No evidence of acute hemorrhage or midline shift. Her last known well time was days ago. Neurology consulted. CT abdomen/pelvis pending. CXR no infiltrates or effusions.   She was admitted to the ICU with a principal diagnosis of DKA (diabetic ketoacidosis).         ACP: No ACP documentation on file. Code status clarified with NOK, daughter, while patient's ex- that she lives with agrees, DNR/DNI.     Patient was seen and examined on 05/20/25 at 00:35 EDT .                  - Portions of the above HPI were copied from previous encounters and edited as appropriate. PMH as detailed below.     Review of Systems   Very questionable considering her present condition    PATIENT HISTORY:  History reviewed. No pertinent past medical history., History reviewed. No pertinent surgical history., History reviewed. No pertinent family history.,  ,   Prior to Admission medications    Medication Sig Start Date End Date Taking? Authorizing Provider   aspirin 81 MG EC tablet Take 1 tablet by mouth Daily.    ProviderMinor MD   carbidopa-levodopa (SINEMET)  MG per tablet Take 1 tablet by mouth 2 (Two) Times a Day.    Minor Carbone MD   carvedilol (COREG) 12.5 MG tablet Take 1 tablet by mouth 2 (Two) Times a Day With Meals.    Minor Carbone MD   doxycycline (VIBRAMYICN) 100 MG tablet Take 1 tablet by mouth 2 (Two) Times a  Day.    Minor Carbone MD   loperamide (IMODIUM) 2 MG capsule Take 1 capsule by mouth 4 (Four) Times a Day As Needed for Diarrhea.    iMnor Carbone MD   omeprazole (priLOSEC) 40 MG capsule Take 1 capsule by mouth Daily.    Minor Carbone MD   ondansetron (ZOFRAN) 4 MG tablet Take 1 tablet by mouth Every 8 (Eight) Hours As Needed for Nausea or Vomiting.    Minor Carbone MD   oxyCODONE-acetaminophen (PERCOCET)  MG per tablet Take 1 tablet by mouth Every 6 (Six) Hours As Needed for Moderate Pain.    Minor Carbone MD   POTASSIUM GLUCONATE PO Take 650 mg by mouth Daily.    Minor Carbone MD   vitamin D3 125 MCG (5000 UT) capsule capsule Take 1 capsule by mouth Daily.    Minor Carbone MD    Allergies:  Penicillins    Current Facility-Administered Medications   Medication Dose Route Frequency Provider Last Rate Last Admin    acetaminophen (TYLENOL) tablet 650 mg  650 mg Oral Q4H PRN Early, ERICA Singh        Or    acetaminophen (TYLENOL) suppository 650 mg  650 mg Rectal Q4H PRN Early, ERICA Singh        aluminum-magnesium hydroxide-simethicone (MAALOX MAX) 400-400-40 MG/5ML suspension 15 mL  15 mL Oral Q6H PRN Early, ERICA Singh        aspirin tablet 325 mg  325 mg Oral Daily Early, ERICA Singh        Or    aspirin suppository 300 mg  300 mg Rectal Daily Early, Juliana MARTE APRN   300 mg at 05/21/25 0432    atorvastatin (LIPITOR) tablet 80 mg  80 mg Oral Nightly Early, ERICA Singh        sennosides-docusate (PERICOLACE) 8.6-50 MG per tablet 2 tablet  2 tablet Oral BID Early, ERICA Singh        And    polyethylene glycol (MIRALAX) packet 17 g  17 g Oral Daily PRN Early, ERICA Singh        And    bisacodyl (DULCOLAX) EC tablet 5 mg  5 mg Oral Daily PRN Early, ERICA Singh        And    bisacodyl (DULCOLAX) suppository 10 mg  10 mg Rectal Daily PRN Early, ERICA Singh        Calcium Replacement - Follow Nurse / BPA Driven Protocol   Not Applicable PRN  Rudy Mcelroy MD        [Held by provider] carbidopa-levodopa (SINEMET)  MG per tablet 1 tablet  1 tablet Oral BID Bonifacio Benjamin APRN        [Held by provider] carvedilol (COREG) tablet 12.5 mg  12.5 mg Oral BID With Meals Bonifacio Benjamin APRN        cefTRIAXone (ROCEPHIN) 1,000 mg in sodium chloride 0.9 % 100 mL MBP  1,000 mg Intravenous Q24H Ladarius Lester DO        dextrose (D50W) (25 g/50 mL) IV injection 10-50 mL  10-50 mL Intravenous Q15 Min PRN Rudy Mcelroy MD        dextrose (GLUTOSE) oral gel 15 g  15 g Oral Q15 Min PRN Rudy Mcelroy MD        dextrose 5 % and sodium chloride 0.45 % infusion  125 mL/hr Intravenous Continuous PRN Rudy Mcelroy MD        dextrose 5 % and sodium chloride 0.45 % with KCl 20 mEq/L infusion  125 mL/hr Intravenous Continuous PRN Rudy Mcelroy  mL/hr at 05/21/25 0927 125 mL/hr at 05/21/25 0927    dextrose 5 % and sodium chloride 0.45 % with KCl 40 mEq/L infusion  125 mL/hr Intravenous Continuous PRN uRdy Mcelroy MD        dextrose 5 % and sodium chloride 0.9 % infusion  125 mL/hr Intravenous Continuous PRN Rudy Mcelroy MD        dextrose 5 % and sodium chloride 0.9 % with KCl 20 mEq/L infusion  125 mL/hr Intravenous Continuous PRN Rudy Mcelroy MD        dextrose 5 % and sodium chloride 0.9 % with KCl 40 mEq/L infusion  125 mL/hr Intravenous Continuous PRN Rudy Mcelroy MD        Glucagon (GLUCAGEN) injection 1 mg  1 mg Intramuscular Q15 Min PRN Rudy Mcelroy MD        heparin (porcine) 5000 UNIT/ML injection 5,000 Units  5,000 Units Subcutaneous Q8H Ladarius Lester DO        insulin regular 1 unit/mL in 0.9% sodium chloride (Glucommander)  0-100 Units/hr Intravenous Titrated Rudy Mcelroy MD 16.6 mL/hr at 05/21/25 1206 16.6 Units/hr at 05/21/25 1206    ipratropium-albuterol (DUO-NEB) nebulizer solution 3 mL  3 mL Nebulization Q6H PRN Juliana Fletcher APRN        Magnesium Standard Dose  Replacement - Follow Nurse / BPA Driven Protocol   Not Applicable Rudy Ndiaye MD        mupirocin (BACTROBAN) 2 % nasal ointment 1 Application  1 Application Each Nare BID Early, ERICA Singh   1 Application at 05/21/25 0818    nitroglycerin (NITROSTAT) SL tablet 0.4 mg  0.4 mg Sublingual Q5 Min PRN Early, Juliana MARTE APRN        pantoprazole (PROTONIX) injection 40 mg  40 mg Intravenous Q AM Early, Juliana MARTE APRN   40 mg at 05/21/25 0516    Phosphorus Replacement - Follow Nurse / BPA Driven Protocol   Not Applicable Rudy Ndiaye MD        Potassium Replacement - Follow Nurse / BPA Driven Protocol   Not Applicable Rudy Ndiaye MD        prochlorperazine (COMPAZINE) injection 5 mg  5 mg Intravenous Q6H PRN Early, ERICA Singh        sodium chloride 0.45 % 1,000 mL with potassium chloride 40 mEq infusion  200 mL/hr Intravenous Continuous PRN Rudy Mcelroy MD        sodium chloride 0.45 % infusion  200 mL/hr Intravenous Continuous PRN Rudy Mcelroy MD        sodium chloride 0.45 % with KCl 20 mEq/L infusion  200 mL/hr Intravenous Continuous PRRudy Franco MD        sodium chloride 0.9 % flush 10 mL  10 mL Intravenous PRN Rudy Mcelroy MD        sodium chloride 0.9 % flush 10 mL  10 mL Intravenous Q12H Rudy Mcelroy MD   10 mL at 05/21/25 0818    sodium chloride 0.9 % flush 10 mL  10 mL Intravenous PRN Rudy Mcelroy MD        sodium chloride 0.9 % flush 10 mL  10 mL Intravenous Q12H Early, ERICA Singh   10 mL at 05/21/25 0818    sodium chloride 0.9 % flush 10 mL  10 mL Intravenous Q12H Early, ERICA Singh   10 mL at 05/21/25 0818    sodium chloride 0.9 % infusion 40 mL  40 mL Intravenous PRN Rudy Mcelroy MD        sodium chloride 0.9 % infusion  200 mL/hr Intravenous Continuous PRRudy Franco MD        sodium chloride 0.9 % with KCl 20 mEq/L infusion  200 mL/hr Intravenous Continuous PRN Rudy Mcelroy MD   Stopped at 05/21/25 0143     sodium chloride 0.9 % with KCl 40 mEq/L infusion  200 mL/hr Intravenous Continuous PRN Rudy Mcelroy MD            ________________________________________________________        OBJECTIVE:  Upon today's exam, The patient is still encephalopathic and restless but otherwise in no acute distress right now      The patient is awake and alert and oriented x self in the hospital   not oriented to time    She can name and follow commands     Cranial nerve examination demonstrate:  Questionable if there is right inferior quadrantanopia in the right eye and questionable inferior nasal on the left side  Pupils are round, reactive to light and accommodation and size of about 3 mm  No ptosis or nystagmus  Funduscopic examination was not successful  Eye movements are conjugate     Sensation on the face and scalp are normal  Muscles of mastication are normal and symmetric  Muscles of  facial expression are normal and symmetric  Hearing is intact bilaterally  Head turning and shoulder shrugs were unremarkable  Tongue was midline  I could not visualize  oropharynx or uvula     Motor examination:  Normal bulk, tone and strength was 5- in upper extremities and lower extremities she is really moving it minimally  No fasciculations     Sensory examination:  Intact for soft touch, pain   Romberg was not evaluated     Reflexes:  0/4     Coordination:  Not to finger-nose-finger or heel-to-shin     Gait:  Deferred     Toe signs:  Mute      NIH Stroke Scale  Interval: -- (arrival to ICU)  1a. Level of Consciousness: 1-->Not alert, but arousable by minor stimulation to obey, answer, or respond  1b. LOC Questions: 0-->Answers both questions correctly  1c. LOC Commands: 0-->Performs both tasks correctly  2. Best Gaze: 0-->Normal  3. Visual: 0-->No visual loss  4. Facial Palsy: 0-->Normal symmetrical movements  5a. Motor Arm, Left: 0-->No drift, limb holds 90 (or 45) degrees for full 10 secs  5b. Motor Arm, Right: 0-->No drift, limb holds  90 (or 45) degrees for full 10 secs  6a. Motor Leg, Left: 2-->Some effort against gravity, leg falls to bed by 5 secs, but has some effort against gravity  6b. Motor Leg, Right: 2-->Some effort against gravity, leg falls to bed by 5 secs, but has some effort against gravity  7. Limb Ataxia: 0-->Absent  8. Sensory: 0-->Normal, no sensory loss  9. Best Language: 1-->Mild-to-moderate aphasia, some obvious loss of fluency or facility of comprehension, without significant limitation on ideas expressed or form of expression. Reduction of speech and/or comprehension, however, makes conversation. . . (see row details)  10. Dysarthria: 1-->Mild-to-moderate dysarthria, patient slurs at least some words and, at worst, can be understood with some difficulty  11. Extinction and Inattention (formerly Neglect): 0-->No abnormality  Total (NIH Stroke Scale): 7         ________________________________________________________   RESULTS REVIEW:    VITAL SIGNS:   Temp:  [92.3 °F (33.5 °C)-98.6 °F (37 °C)] 97.3 °F (36.3 °C)  Heart Rate:  [] 95  Resp:  [12-22] 22  BP: ()/(42-89) 142/84     LABS:      Lab 05/21/25  1203 05/21/25  0542 05/21/25  0520 05/20/25  1820 05/20/25  1812   WBC  --  12.92* 13.31* 22.38*  --    HEMOGLOBIN 8.3* 5.7* 5.8* 9.1*  --    HEMATOCRIT 26.5* 18.6* 19.2* 32.9*  --    PLATELETS  --  183 192 364  --    NEUTROS ABS  --  10.35* 10.60* 19.30*  --    IMMATURE GRANS (ABS)  --  0.18* 0.13* 0.59*  --    LYMPHS ABS  --  2.01 2.16 1.85  --    MONOS ABS  --  0.37 0.41 0.58  --    EOS ABS  --  0.00 0.00 0.03  --    MCV  --  93.5 94.1 101.9*  --    PROCALCITONIN  --   --   --  1.17*  --    LACTATE  --   --   --   --  1.4   PROTIME  --   --   --  15.4*  --    APTT  --   --   --  28.0  --          Lab 05/21/25  1203 05/21/25  0841 05/21/25  0452 05/21/25  0053 05/20/25 2003 05/20/25  1820   SODIUM 143 143 143 145 141 138   POTASSIUM 3.7 3.4* 3.7 3.8 4.7 4.8   CHLORIDE 108* 105 105 106 102 98   CO2 14.4* 10.5* 9.6*  6.0* 3.1* 3.4*   ANION GAP 20.6* 27.5* 28.4* 33.0* 35.9* 36.6*   BUN 17 17 17 17 17 18   CREATININE 2.06* 2.05* 2.04* 1.97* 2.12* 2.27*   EGFR 28.4* 28.5* 28.7* 29.9* 27.4* 25.2*   GLUCOSE 160* 202* 230* 245* 377* 397*   CALCIUM 7.3* 7.3* 7.4* 7.2* 7.7* 8.2*   IONIZED CALCIUM  --   --   --  1.12*  --   --    MAGNESIUM 1.7 1.7 1.9 1.9 2.2 2.3   PHOSPHORUS 2.3* 2.5 3.2 4.5 6.2* 6.6*   HEMOGLOBIN A1C  --   --   --   --   --  10.30*   TSH  --   --   --   --   --  2.890         Lab 05/21/25 0452 05/20/25  1820   TOTAL PROTEIN 4.6* 5.1*   ALBUMIN 3.0* 2.2*   GLOBULIN 1.6 2.9   ALT (SGPT) 13 18   AST (SGOT) 29 23   BILIRUBIN 0.3 0.2   ALK PHOS 235* 359*   LIPASE  --  21         Lab 05/20/25 2003 05/20/25  1820   HSTROP T 88* 95*   PROTIME  --  15.4*   INR  --  1.22*         Lab 05/21/25 0452   CHOLESTEROL 74   LDL CHOL 18   HDL CHOL 27*   TRIGLYCERIDES 180*         Lab 05/21/25  0841 05/21/25  0452 05/20/25  1840   IRON 14*  --   --    IRON SATURATION (TSAT) 23  --   --    TIBC 60*  --   --    TRANSFERRIN 40*  --   --    FERRITIN  --  558.00*  --    ABO TYPING  --   --  A   RH TYPING  --   --  Positive   ANTIBODY SCREEN  --   --  Negative         Lab 05/20/25 2334 05/20/25  1908   PH, ARTERIAL  --  6.988*   PCO2, ARTERIAL  --  7.5*   PO2 ART  --  120.6*   O2 SATURATION ART  --  96.0   FIO2 21 21   HCO3 ART  --  1.8*   BASE EXCESS ART  --  -27.5*     UA          5/20/2025    18:36   Urinalysis   Specific [place], UA 1.015    Ketones, UA 15 mg/dL (1+)    Blood, UA Negative    Leukocytes, UA Negative    Nitrite, UA Negative        Lab Results   Component Value Date    TSH 2.890 05/20/2025    LDL 18 05/21/2025    HGBA1C 10.30 (H) 05/20/2025       IMAGING STUDIES:  XR Chest 1 View  Result Date: 5/21/2025  Impression: 1.New right internal jugular central venous catheter terminates in the upper right atrium. 2.Bibasilar atelectasis versus pneumonia. Electronically Signed: Clemente Dorman MD  5/21/2025 12:10 AM EDT  Workstation  ID: [id number]    CT Abdomen Pelvis Without Contrast  Result Date: 5/20/2025  Impression: 1. Coronary calcification. 2. Patchy airspace opacity in the lung bases possibly secondary to atelectasis or pneumonia. 3. Generalized anasarca. Small amount of abdominal and pelvic free fluid. 4. Punctate bilateral nonobstructing renal calcifications. 5. There appear to be areas of wall thickening throughout the large and small bowel possibly secondary to enterocolitis. Ischemia is felt to be less likely given the diffuse appearing nature. Electronically Signed: Markos Holland MD  5/20/2025 9:57 PM EDT  Workstation ID: [id number]    CT Head Without Contrast  Result Date: 5/20/2025  Impression: New 5 cm x 2 cm area of low-attenuation in the left PCA distribution suspicious for an area of infarction. No evidence of acute hemorrhage or midline shift. Electronically Signed: Markos Holland MD  5/20/2025 8:48 PM EDT  Workstation ID: [id number]    XR Chest 1 View  Result Date: 5/20/2025  Mild bibasal infiltrates. Electronically Signed: Markos Holland MD  5/20/2025 7:38 PM EDT  Workstation ID: [id number]      I reviewed the patient's new clinical results.    ________________________________________________________     PROBLEM LIST:    DKA (diabetic ketoacidosis)            ASSESSMENT/PLAN:  Mental status changes which could be multifactorial toxic and metabolic encephalopathy  Abnormal head CT, it looks like a posterior branch of MCA but the patient is tilted so PCA abnormalities there, specifically if you look at the shape of it I will get MRI brain and MRA head and neck whenever possible      Her renal function was not good specially the creatinine level so I may not be able to get a CTA or with contrast studies her H&H is dropping and we do not know why        So aspirin alone may be the way to go up so far    Other test to be ordered she was not tenecteplase or intervention candidate as we do not know the last known well and also her  deficits which are nonspecific          Modification of stroke risk factors:   - Blood pressure should be less than 130/80 outpatient, HbA1c less than 6.5, LDL less than 70; b12>500 and smoking cessation if applicable. We would be grateful if the primary team / primary care physician would keep a close watch on the above targets.  - Stroke education  - Follow up with neurologist of choice      I discussed the patient's findings and my recommendations with patient and nursing staff    Hansa Osullivan MD  05/21/25  13:10 EDT

## 2025-05-21 NOTE — NURSING NOTE
Spoke with Dr. Osullivan regarding finding on CT, he said nothing to do tonight. He will address in the AM

## 2025-05-21 NOTE — CONSULTS
Got called to ICU to place a PICC line for levophed. Assessed pt's R arm but pt had an infiltrate of that was edematous and red on the inside of pt's R upper arm. No cephalic vein was found to place a PICC line in. Assessed pt's Left upper arm,. Pt had no viable vessels that would hold a 4 or 5 FR PICC line. Showed intensivist my  findings on our U/S machine. Would recommend possibly a CVC or have provider have a consult with IR.

## 2025-05-21 NOTE — ACP (ADVANCE CARE PLANNING)
Social Work Assessment   Sudarshan     Patient Name: Sheryl Stuart  MRN: [id number]  Today's Date: 5/21/2025    Admit Date: 5/20/2025     Demographic Summary       Row Name 05/21/25 2760       General Information    Reason for Consult decision-making    General Information Comments SW reviewed chart and noted pt's ex-spouse is the only listed contact, but pt has an adult dtr and no AD's. SW obtained pt's dtr's contact info and updated chart. According to HB 1119, pt's dtr is legal NOK.             MEREDITH Mayfield, W  Medical Social Worker  Ph [phone]  Fax [phone]  [email or web address]

## 2025-05-21 NOTE — NURSING NOTE
"Pt failed dysphagia screen this morning. Pt refused to work with speech this afternoon stating she was \"too uncomfortable to eat/drink anything.\"   "

## 2025-05-21 NOTE — DISCHARGE SUMMARY
CRITICAL CARE DISCHARGE SUMMARY           PATIENT NAME:  Sheryl Stuart             :  1970            MRN:  [id number]    DATE OF ADMISSION: 2025     DATE OF DISCHARGE: 2025    DISCHARGE DIAGNOSES:   Diabetic ketoacidosis without coma, with history of diabetes mellitus, type 1  Metabolic acidosis  Suspected acute ischemic stroke  Dysphagia  Shock, hypovolemia vs septic   Hypoalbuminemia  Acute Kidney Injury  Elevated CK  Hypothermia, resolved  Hypocalcemia  COPD  Hyperlipidemia  GERD  Chronic pain  Hx NSTEMI, s/p PCI  Hx chronic diarrhea      HOSPITAL COURSE  Patient is a 54 y.o. female with PMH of type 1 diabetes mellitus, non-STEMI, hypertension, GERD, chronic pain, COPD, and anxiety presented to the hospital for confusion.  Per report, family stated that the patient had been in bed and not eating or drinking x4 days. In the ED the patient was able to state her name only and follow some simple commands. Labs remarkable for ABG 6.988/7.5/120/1.8, , troponin 95, repeat troponin 88, anion gap 36.6, creatinine 2.27, glucose 397, calcium 8.2, phosphorus 6.6, alkaline phosphatase 359, albumin 2.2, hemoglobin A1c 10.3, osmolality 333, procalcitonin 1.17, PT 15.4, INR 1.22, WBC 22.38, hemoglobin 9.1.  UA showed trace amount of glucose, ketonuria, trace proteinuria.  UDS was positive for oxycodone which the patient is prescribed.  She received 2 A of bicarb, a full sepsis bolus, 1 additional liter IVF, cefepime, vancomycin, and was started on norepinephrine for persistent hypotension.  CT head showed a new 5 cm x 2 cm area of low-attenuation in the left PCA distribution suspicious for an area of infarction. No evidence of acute hemorrhage or midline shift. Her last known well time was days ago. Neurology consulted. CT abdomen/pelvis pending. CXR no infiltrates or effusions.     She was admitted to the ICU with a principal diagnosis of DKA (diabetic ketoacidosis). On , patient afebrile, VSS.   A&Ox4.  Has only had 700 mL of UOP since admission, net +4.6 L.  Cr at baseline.  CO2 up to 14, AG remains 21.  Hb 5.7--transfused and came up to 8.  Pt has elected that she does not wish to continue any further treatment and has asked for hospice.  Pt's daughter agreed with this when RN spoke with her on the phone.  Will likely go home with hospice.  Continue current treatment measures for now; will not escalate care.  Patient did not pass her bedside swallow evaluation, and when given option to proceed with a video swallow, decided against proceeding with this test.  She stated that she would prefer to go comfort measures.  She was seen and evaluated by neurology.    Patient was accepted for transition to [facility] with [facility].  Patient will be readmitted to the hospitalist team.  Comfort orders in place.      PROCEDURES PERFORMED    05/20 2328 Insert Central Line At Bedside    LABS/RADIOLOGICAL STUDIES  Lab Results (last 72 hours)       Procedure Component Value Units Date/Time    POC Glucose Once [[id number]]  (Abnormal) Collected: 05/21/25 1457    Specimen: Blood Updated: 05/21/25 1458     Glucose 126 mg/dL      Comment: Serial Number: [id number]:  [id number]       Vitamin B12 [[id number]] Collected: 05/20/25 1818    Specimen: Blood Updated: 05/21/25 1415    POC Glucose Once [[id number]]  (Abnormal) Collected: 05/21/25 1310    Specimen: Blood Updated: 05/21/25 1332     Glucose 143 mg/dL      Comment: Serial Number: [id number]:  [id number]       POC Glucose Once [[id number]]  (Abnormal) Collected: 05/21/25 1202    Specimen: Blood Updated: 05/21/25 1332     Glucose 174 mg/dL      Comment: Serial Number: [id number]:  [id number]       Magnesium [[id number]]  (Normal) Collected: 05/21/25 1203    Specimen: Blood Updated: 05/21/25 1233     Magnesium 1.7 mg/dL     Basic Metabolic Panel [[id number]]  (Abnormal) Collected: 05/21/25 1203    Specimen: Blood Updated: 05/21/25 1233     Glucose 160  mg/dL      BUN 17 mg/dL      Creatinine 2.06 mg/dL      Sodium 143 mmol/L      Potassium 3.7 mmol/L      Chloride 108 mmol/L      CO2 14.4 mmol/L      Calcium 7.3 mg/dL      BUN/Creatinine Ratio 8.3     Anion Gap 20.6 mmol/L      eGFR 28.4 mL/min/1.73     Narrative:      GFR Categories in Chronic Kidney Disease (CKD)              GFR Category          GFR (mL/min/1.73)    Interpretation  G1                    90 or greater        Normal or high (1)  G2                    60-89                Mild decrease (1)  G3a                   45-59                Mild to moderate decrease  G3b                   30-44                Moderate to severe decrease  G4                    15-29                Severe decrease  G5                    14 or less           Kidney failure    (1)In the absence of evidence of kidney disease, neither GFR category G1 or G2 fulfill the criteria for CKD.    eGFR calculation 2021 CKD-EPI creatinine equation, which does not include race as a factor    Phosphorus [[id number]]  (Abnormal) Collected: 05/21/25 1203    Specimen: Blood Updated: 05/21/25 1233     Phosphorus 2.3 mg/dL     Hemoglobin & Hematocrit, Blood [[id number]]  (Abnormal) Collected: 05/21/25 1203    Specimen: Blood Updated: 05/21/25 1216     Hemoglobin 8.3 g/dL      Comment: Result checked          Hematocrit 26.5 %     Haptoglobin [[id number]]  (Normal) Collected: 05/20/25 2003    Specimen: Blood Updated: 05/21/25 1105     Haptoglobin 127 mg/dL      Comment: Specimen hemolyzed. Results may be affected.       POC Glucose Once [[id number]]  (Abnormal) Collected: 05/21/25 1043    Specimen: Blood Updated: 05/21/25 1045     Glucose 199 mg/dL      Comment: Serial Number: [id number]:  [id number]       CK [[id number]]  (Abnormal) Collected: 05/21/25 0841    Specimen: Blood Updated: 05/21/25 0957     Creatine Kinase 273 U/L     POC Glucose Once [[id number]]  (Abnormal) Collected: 05/21/25 0956    Specimen: Blood Updated: 05/21/25 0957      Glucose 211 mg/dL      Comment: Serial Number: [id number]:  [id number]       Magnesium [[id number]]  (Normal) Collected: 05/21/25 0841    Specimen: Blood Updated: 05/21/25 0913     Magnesium 1.7 mg/dL     Basic Metabolic Panel [[id number]]  (Abnormal) Collected: 05/21/25 0841    Specimen: Blood Updated: 05/21/25 0913     Glucose 202 mg/dL      BUN 17 mg/dL      Creatinine 2.05 mg/dL      Sodium 143 mmol/L      Potassium 3.4 mmol/L      Comment: Specimen hemolyzed.  Result may be falsely elevated.        Chloride 105 mmol/L      CO2 10.5 mmol/L      Calcium 7.3 mg/dL      BUN/Creatinine Ratio 8.3     Anion Gap 27.5 mmol/L      eGFR 28.5 mL/min/1.73     Narrative:      GFR Categories in Chronic Kidney Disease (CKD)              GFR Category          GFR (mL/min/1.73)    Interpretation  G1                    90 or greater        Normal or high (1)  G2                    60-89                Mild decrease (1)  G3a                   45-59                Mild to moderate decrease  G3b                   30-44                Moderate to severe decrease  G4                    15-29                Severe decrease  G5                    14 or less           Kidney failure    (1)In the absence of evidence of kidney disease, neither GFR category G1 or G2 fulfill the criteria for CKD.    eGFR calculation 2021 CKD-EPI creatinine equation, which does not include race as a factor    Ferritin [[id number]]  (Abnormal) Collected: 05/21/25 0452    Specimen: Blood Updated: 05/21/25 0909     Ferritin 558.00 ng/mL     Narrative:      Results may be falsely decreased if patient taking Biotin.      Phosphorus [[id number]]  (Normal) Collected: 05/21/25 0841    Specimen: Blood Updated: 05/21/25 0908     Phosphorus 2.5 mg/dL     Vancomycin, Random [[id number]]  (Normal) Collected: 05/21/25 0841    Specimen: Blood Updated: 05/21/25 0908     Vancomycin Random 10.40 mcg/mL     Narrative:      Therapeutic Ranges for Vancomycin    Vancomycin Random    5.0-40.0 mcg/mL  Vancomycin Trough   5.0-20.0 mcg/mL  Vancomycin Peak     20.0-40.0 mcg/mL    Iron Profile [[id number]]  (Abnormal) Collected: 05/21/25 0841    Specimen: Blood Updated: 05/21/25 0908     Iron 14 mcg/dL      Iron Saturation (TSAT) 23 %      Transferrin 40 mg/dL      TIBC 60 mcg/dL     Reticulocytes [[id number]]  (Normal) Collected: 05/21/25 0542    Specimen: Blood Updated: 05/21/25 0859     Reticulocyte % 1.25 %      Reticulocyte Absolute 0.0243 10*6/mm3     POC Glucose Once [[id number]]  (Abnormal) Collected: 05/21/25 0839    Specimen: Blood Updated: 05/21/25 0841     Glucose 203 mg/dL      Comment: Serial Number: [id number]:  [id number]       POC Glucose Once [[id number]]  (Abnormal) Collected: 05/21/25 0727    Specimen: Blood Updated: 05/21/25 0729     Glucose 222 mg/dL      Comment: Serial Number: [id number]:  [id number]       POC Glucose Once [[id number]]  (Abnormal) Collected: 05/21/25 0609    Specimen: Blood Updated: 05/21/25 0611     Glucose 227 mg/dL      Comment: Serial Number: [id number]:  [id number]       CBC & Differential [[id number]]  (Abnormal) Collected: 05/21/25 0542    Specimen: Blood Updated: 05/21/25 0555    Narrative:      The following orders were created for panel order CBC & Differential.  Procedure                               Abnormality         Status                     ---------                               -----------         ------                     CBC Auto Differential[[id number]]        Abnormal            Final result                 Please view results for these tests on the individual orders.    CBC Auto Differential [[id number]]  (Abnormal) Collected: 05/21/25 0542    Specimen: Blood Updated: 05/21/25 0555     WBC 12.92 10*3/mm3      RBC 1.99 10*6/mm3      Hemoglobin 5.7 g/dL      Hematocrit 18.6 %      MCV 93.5 fL      MCH 28.6 pg      MCHC 30.6 g/dL      RDW 15.2 %      RDW-SD 51.4 fl      MPV 11.6 fL      Platelets 183 10*3/mm3      Neutrophil  % 80.0 %      Lymphocyte % 15.6 %      Monocyte % 2.9 %      Eosinophil % 0.0 %      Basophil % 0.1 %      Immature Grans % 1.4 %      Neutrophils, Absolute 10.35 10*3/mm3      Lymphocytes, Absolute 2.01 10*3/mm3      Monocytes, Absolute 0.37 10*3/mm3      Eosinophils, Absolute 0.00 10*3/mm3      Basophils, Absolute 0.01 10*3/mm3      Immature Grans, Absolute 0.18 10*3/mm3      nRBC 0.3 /100 WBC     Phosphorus [[id number]]  (Normal) Collected: 05/21/25 0452    Specimen: Blood Updated: 05/21/25 0532     Phosphorus 3.2 mg/dL     CBC & Differential [[id number]]  (Abnormal) Collected: 05/21/25 0520    Specimen: Blood Updated: 05/21/25 0531    Narrative:      The following orders were created for panel order CBC & Differential.  Procedure                               Abnormality         Status                     ---------                               -----------         ------                     CBC Auto Differential[[id number]]        Abnormal            Final result                 Please view results for these tests on the individual orders.    CBC Auto Differential [[id number]]  (Abnormal) Collected: 05/21/25 0520    Specimen: Blood Updated: 05/21/25 0531     WBC 13.31 10*3/mm3      RBC 2.04 10*6/mm3      Hemoglobin 5.8 g/dL      Comment: Result checked          Hematocrit 19.2 %      Comment: Result checked          MCV 94.1 fL      MCH 28.4 pg      MCHC 30.2 g/dL      RDW 15.4 %      RDW-SD 53.0 fl      MPV 11.5 fL      Platelets 192 10*3/mm3      Neutrophil % 79.6 %      Lymphocyte % 16.2 %      Monocyte % 3.1 %      Eosinophil % 0.0 %      Basophil % 0.1 %      Immature Grans % 1.0 %      Neutrophils, Absolute 10.60 10*3/mm3      Lymphocytes, Absolute 2.16 10*3/mm3      Monocytes, Absolute 0.41 10*3/mm3      Eosinophils, Absolute 0.00 10*3/mm3      Basophils, Absolute 0.01 10*3/mm3      Immature Grans, Absolute 0.13 10*3/mm3      nRBC 0.4 /100 WBC     Magnesium [[id number]]  (Normal) Collected: 05/21/25 0452     Specimen: Blood Updated: 05/21/25 0531     Magnesium 1.9 mg/dL     Comprehensive Metabolic Panel [[id number]]  (Abnormal) Collected: 05/21/25 0452    Specimen: Blood Updated: 05/21/25 0531     Glucose 230 mg/dL      BUN 17 mg/dL      Creatinine 2.04 mg/dL      Sodium 143 mmol/L      Potassium 3.7 mmol/L      Chloride 105 mmol/L      CO2 9.6 mmol/L      Calcium 7.4 mg/dL      Total Protein 4.6 g/dL      Albumin 3.0 g/dL      ALT (SGPT) 13 U/L      AST (SGOT) 29 U/L      Alkaline Phosphatase 235 U/L      Total Bilirubin 0.3 mg/dL      Globulin 1.6 gm/dL      A/G Ratio 1.9 g/dL      BUN/Creatinine Ratio 8.3     Anion Gap 28.4 mmol/L      eGFR 28.7 mL/min/1.73     Narrative:      GFR Categories in Chronic Kidney Disease (CKD)              GFR Category          GFR (mL/min/1.73)    Interpretation  G1                    90 or greater        Normal or high (1)  G2                    60-89                Mild decrease (1)  G3a                   45-59                Mild to moderate decrease  G3b                   30-44                Moderate to severe decrease  G4                    15-29                Severe decrease  G5                    14 or less           Kidney failure    (1)In the absence of evidence of kidney disease, neither GFR category G1 or G2 fulfill the criteria for CKD.    eGFR calculation 2021 CKD-EPI creatinine equation, which does not include race as a factor    Lipid Panel [[id number]]  (Abnormal) Collected: 05/21/25 0452    Specimen: Blood Updated: 05/21/25 0524     Total Cholesterol 74 mg/dL      Triglycerides 180 mg/dL      HDL Cholesterol 27 mg/dL      LDL Cholesterol  18 mg/dL      VLDL Cholesterol 29 mg/dL      LDL/HDL Ratio 0.41    Narrative:      Cholesterol Reference Ranges  (U.S. Department of Health and Human Services ATP III Classifications)    Desirable          <200 mg/dL  Borderline High    200-239 mg/dL  High Risk          >240 mg/dL      Triglyceride Reference Ranges  (U.S. Department of  Health and Human Services ATP III Classifications)    Normal           <150 mg/dL  Borderline High  150-199 mg/dL  High             200-499 mg/dL  Very High        >500 mg/dL    HDL Reference Ranges  (U.S. Department of Health and Human Services ATP III Classifications)    Low     <40 mg/dl (major risk factor for CHD)  High    >60 mg/dl ('negative' risk factor for CHD)        LDL Reference Ranges  (U.S. Department of Health and Human Services ATP III Classifications)    Optimal          <100 mg/dL  Near Optimal     100-129 mg/dL  Borderline High  130-159 mg/dL  High             160-189 mg/dL  Very High        >189 mg/dL    LDL is calculated using the NIH LDL-C calculation.      POC Glucose Once [[id number]]  (Abnormal) Collected: 05/21/25 0450    Specimen: Blood Updated: 05/21/25 0452     Glucose 248 mg/dL      Comment: Serial Number: [id number]:  [id number]       POC Glucose Once [[id number]]  (Abnormal) Collected: 05/21/25 0340    Specimen: Blood Updated: 05/21/25 0342     Glucose 251 mg/dL      Comment: Serial Number: [id number]:  [id number]       POC Glucose Once [[id number]]  (Abnormal) Collected: 05/21/25 0237    Specimen: Blood Updated: 05/21/25 0239     Glucose 255 mg/dL      Comment: Serial Number: [id number]:  [id number]       Calcium, Ionized [[id number]]  (Abnormal) Collected: 05/21/25 0053    Specimen: Blood Updated: 05/21/25 0221     Ionized Calcium 1.12 mmol/L     POC Glucose Once [[id number]]  (Abnormal) Collected: 05/21/25 0134    Specimen: Blood Updated: 05/21/25 0136     Glucose 228 mg/dL      Comment: Serial Number: [id number]:  [id number]       Phosphorus [[id number]]  (Normal) Collected: 05/21/25 0053    Specimen: Blood Updated: 05/21/25 0129     Phosphorus 4.5 mg/dL     Basic Metabolic Panel [[id number]]  (Abnormal) Collected: 05/21/25 0053    Specimen: Blood Updated: 05/21/25 0128     Glucose 245 mg/dL      BUN 17 mg/dL      Creatinine 1.97 mg/dL      Sodium 145 mmol/L       Potassium 3.8 mmol/L      Chloride 106 mmol/L      CO2 6.0 mmol/L      Calcium 7.2 mg/dL      BUN/Creatinine Ratio 8.6     Anion Gap 33.0 mmol/L      eGFR 29.9 mL/min/1.73     Narrative:      GFR Categories in Chronic Kidney Disease (CKD)              GFR Category          GFR (mL/min/1.73)    Interpretation  G1                    90 or greater        Normal or high (1)  G2                    60-89                Mild decrease (1)  G3a                   45-59                Mild to moderate decrease  G3b                   30-44                Moderate to severe decrease  G4                    15-29                Severe decrease  G5                    14 or less           Kidney failure    (1)In the absence of evidence of kidney disease, neither GFR category G1 or G2 fulfill the criteria for CKD.    eGFR calculation 2021 CKD-EPI creatinine equation, which does not include race as a factor    Magnesium [[id number]]  (Normal) Collected: 05/21/25 0053    Specimen: Blood Updated: 05/21/25 0128     Magnesium 1.9 mg/dL     MRSA Screen, PCR (Inpatient) - Swab, Nares [[id number]]  (Normal) Collected: 05/20/25 2357    Specimen: Swab from Nares Updated: 05/21/25 0113     MRSA PCR No MRSA Detected    Narrative:      The negative predictive value of this diagnostic test is high and should only be used to consider de-escalating anti-MRSA therapy. A positive result may indicate colonization with MRSA and must be correlated clinically.    Respiratory Panel PCR w/COVID-19(SARS-CoV-2) [facility]NETTE/[facility]ELA/[facility] In-House, NP Swab in UTM/VTM, 2 HR TAT - Swab, Nasopharynx [[id number]]  (Normal) Collected: 05/20/25 2357    Specimen: Swab from Nasopharynx Updated: 05/21/25 0048     ADENOVIRUS, PCR Not Detected     Coronavirus 229E Not Detected     Coronavirus HKU1 Not Detected     Coronavirus NL63 Not Detected     Coronavirus OC43 Not Detected     COVID19 Not Detected     Human Metapneumovirus Not Detected     Human Rhinovirus/Enterovirus Not  Detected     Influenza A PCR Not Detected     Influenza B PCR Not Detected     Parainfluenza Virus 1 Not Detected     Parainfluenza Virus 2 Not Detected     Parainfluenza Virus 3 Not Detected     Parainfluenza Virus 4 Not Detected     RSV, PCR Not Detected     Bordetella pertussis pcr Not Detected     Bordetella parapertussis PCR Not Detected     Chlamydophila pneumoniae PCR Not Detected     Mycoplasma pneumo by PCR Not Detected    Narrative:      In the setting of a positive respiratory panel with a viral infection PLUS a negative procalcitonin without other underlying concern for bacterial infection, consider observing off antibiotics or discontinuation of antibiotics and continue supportive care. If the respiratory panel is positive for atypical bacterial infection (Bordetella pertussis, Chlamydophila pneumoniae, or Mycoplasma pneumoniae), consider antibiotic de-escalation to target atypical bacterial infection.    POC Glucose Once [[id number]]  (Abnormal) Collected: 05/21/25 0024    Specimen: Blood Updated: 05/21/25 0025     Glucose 252 mg/dL      Comment: Serial Number: [id number]:  [id number]       Blood Gas, Venous - [[id number]]  (Abnormal) Collected: 05/20/25 2334    Specimen: Venous Blood from Cannula Updated: 05/20/25 2338     Site PICC     pH, Venous 7.107 pH Units      pCO2, Venous 14.5 mm Hg      pO2, Venous 44.1 mm Hg      HCO3, Venous 4.6 mmol/L      Base Excess, Venous -22.9 mmol/L      Comment: Serial Number: [id number]:  [id number]        O2 Saturation, Venous 65.2 %      CO2 Content 5.0 mmol/L      Barometric Pressure for Blood Gas --     Comment: N/A        Modality Room Air     FIO2 21 %     POC Glucose Once [[id number]]  (Abnormal) Collected: 05/20/25 2308    Specimen: Blood Updated: 05/20/25 2310     Glucose 301 mg/dL      Comment: Serial Number: [id number]:  [id number]       POC Glucose Once [[id number]]  (Abnormal) Collected: 05/20/25 2205    Specimen: Blood Updated: 05/20/25 2207      Glucose 335 mg/dL      Comment: Serial Number: [id number]:  [id number]       Beta Hydroxybutyrate Quantitative [[id number]] Collected: 05/20/25 2003    Specimen: Blood Updated: 05/20/25 2115    Hemoglobin A1c [[id number]]  (Abnormal) Collected: 05/20/25 1820    Specimen: Blood Updated: 05/20/25 2110     Hemoglobin A1C 10.30 %     Narrative:      Hemoglobin A1C Ranges:    Increased Risk for Diabetes  5.7% to 6.4%  Diabetes                     >= 6.5%  Diabetic Goal                < 7.0%    POC Glucose Once [[id number]]  (Abnormal) Collected: 05/20/25 2100    Specimen: Blood Updated: 05/20/25 2102     Glucose 346 mg/dL      Comment: Serial Number: [id number]:  [id number]       Basic Metabolic Panel [[id number]]  (Abnormal) Collected: 05/20/25 2003    Specimen: Blood Updated: 05/20/25 2034     Glucose 377 mg/dL      BUN 17 mg/dL      Creatinine 2.12 mg/dL      Sodium 141 mmol/L      Potassium 4.7 mmol/L      Chloride 102 mmol/L      CO2 3.1 mmol/L      Calcium 7.7 mg/dL      BUN/Creatinine Ratio 8.0     Anion Gap 35.9 mmol/L      eGFR 27.4 mL/min/1.73     Narrative:      GFR Categories in Chronic Kidney Disease (CKD)              GFR Category          GFR (mL/min/1.73)    Interpretation  G1                    90 or greater        Normal or high (1)  G2                    60-89                Mild decrease (1)  G3a                   45-59                Mild to moderate decrease  G3b                   30-44                Moderate to severe decrease  G4                    15-29                Severe decrease  G5                    14 or less           Kidney failure    (1)In the absence of evidence of kidney disease, neither GFR category G1 or G2 fulfill the criteria for CKD.    eGFR calculation 2021 CKD-EPI creatinine equation, which does not include race as a factor    Magnesium [[id number]]  (Normal) Collected: 05/20/25 2003    Specimen: Blood Updated: 05/20/25 2034     Magnesium 2.2 mg/dL     High  Sensitivity Troponin T 1Hr [[id number]]  (Abnormal) Collected: 05/20/25 2003    Specimen: Blood Updated: 05/20/25 2034     HS Troponin T 88 ng/L      Troponin T Numeric Delta -7 ng/L      Troponin T % Delta -7    Narrative:      High Sensitive Troponin T Reference Range:  <14.0 ng/L- Negative Female for AMI  <22.0 ng/L- Negative Male for AMI  >=14 - Abnormal Female indicating possible myocardial injury.  >=22 - Abnormal Male indicating possible myocardial injury.   Clinicians would have to utilize clinical acumen, EKG, Troponin, and serial changes to determine if it is an Acute Myocardial Infarction or myocardial injury due to an underlying chronic condition.         Phosphorus [[id number]]  (Abnormal) Collected: 05/20/25 2003    Specimen: Blood Updated: 05/20/25 2029     Phosphorus 6.2 mg/dL     POC Glucose Once [[id number]]  (Abnormal) Collected: 05/20/25 2011    Specimen: Blood Updated: 05/20/25 2012     Glucose 383 mg/dL      Comment: Serial Number: [id number]:  [id number]       Phosphorus [[id number]]  (Abnormal) Collected: 05/20/25 1820    Specimen: Blood Updated: 05/20/25 1941     Phosphorus 6.6 mg/dL     Ketone Bodies, Serum (Not performed at [place]) [[id number]]  (Abnormal) Collected: 05/20/25 1820    Specimen: Blood Updated: 05/20/25 1933    Narrative:      The following orders were created for panel order Ketone Bodies, Serum (Not performed at [facility]).  Procedure                               Abnormality         Status                     ---------                               -----------         ------                     Acetone[[id number]]                      Abnormal            Final result                 Please view results for these tests on the individual orders.    Acetone [[id number]]  (Abnormal) Collected: 05/20/25 1820    Specimen: Blood Updated: 05/20/25 1933     Acetone Moderate    Osmolality, Serum [[id number]]  (Abnormal) Collected: 05/20/25 1818    Specimen: Blood Updated: 05/20/25  1932     Osmolality 333 mOsm/kg     Blood Gas, Arterial - [[id number]]  (Abnormal) Collected: 05/20/25 1908    Specimen: Arterial Blood Updated: 05/20/25 1913     Site Right Radial     Curtis's Test Positive     pH, Arterial 6.988 pH units      pCO2, Arterial 7.5 mm Hg      pO2, Arterial 120.6 mm Hg      HCO3, Arterial 1.8 mmol/L      Base Excess, Arterial -27.5 mmol/L      Comment: Serial Number: [id number]:  [id number]        O2 Saturation, Arterial 96.0 %      CO2 Content <5.0 mmol/L      Barometric Pressure for Blood Gas --     Comment: N/A        Modality Room Air     FIO2 21 %      Notified Who --     Read Back --     Notified Time --     Hemodilution No     PO2/FIO2 574    High Sensitivity Troponin T [[id number]]  (Abnormal) Collected: 05/20/25 1820    Specimen: Blood Updated: 05/20/25 1911     HS Troponin T 95 ng/L     Narrative:      High Sensitive Troponin T Reference Range:  <14.0 ng/L- Negative Female for AMI  <22.0 ng/L- Negative Male for AMI  >=14 - Abnormal Female indicating possible myocardial injury.  >=22 - Abnormal Male indicating possible myocardial injury.   Clinicians would have to utilize clinical acumen, EKG, Troponin, and serial changes to determine if it is an Acute Myocardial Infarction or myocardial injury due to an underlying chronic condition.         Comprehensive Metabolic Panel [[id number]]  (Abnormal) Collected: 05/20/25 1820    Specimen: Blood Updated: 05/20/25 1911     Glucose 397 mg/dL      BUN 18 mg/dL      Creatinine 2.27 mg/dL      Sodium 138 mmol/L      Potassium 4.8 mmol/L      Chloride 98 mmol/L      CO2 3.4 mmol/L      Calcium 8.2 mg/dL      Total Protein 5.1 g/dL      Albumin 2.2 g/dL      ALT (SGPT) 18 U/L      AST (SGOT) 23 U/L      Alkaline Phosphatase 359 U/L      Total Bilirubin 0.2 mg/dL      Globulin 2.9 gm/dL      A/G Ratio 0.8 g/dL      BUN/Creatinine Ratio 7.9     Anion Gap 36.6 mmol/L      eGFR 25.2 mL/min/1.73     Narrative:      GFR Categories in Chronic Kidney  "Disease (CKD)              GFR Category          GFR (mL/min/1.73)    Interpretation  G1                    90 or greater        Normal or high (1)  G2                    60-89                Mild decrease (1)  G3a                   45-59                Mild to moderate decrease  G3b                   30-44                Moderate to severe decrease  G4                    15-29                Severe decrease  G5                    14 or less           Kidney failure    (1)In the absence of evidence of kidney disease, neither GFR category G1 or G2 fulfill the criteria for CKD.    eGFR calculation 2021 CKD-EPI creatinine equation, which does not include race as a factor    Magnesium [[id number]]  (Normal) Collected: 05/20/25 1820    Specimen: Blood Updated: 05/20/25 1911     Magnesium 2.3 mg/dL     Lipase [[id number]]  (Normal) Collected: 05/20/25 1820    Specimen: Blood Updated: 05/20/25 1904     Lipase 21 U/L     Procalcitonin [[id number]]  (Abnormal) Collected: 05/20/25 1820    Specimen: Blood Updated: 05/20/25 1904     Procalcitonin 1.17 ng/mL     Narrative:      As a Marker for Sepsis (Non-Neonates):    1. <0.5 ng/mL represents a low risk of severe sepsis and/or septic shock.  2. >2 ng/mL represents a high risk of severe sepsis and/or septic shock.    As a Marker for Lower Respiratory Tract Infections that require antibiotic therapy:    PCT on Admission    Antibiotic Therapy       6-12 Hrs later    >0.5                Strongly Recommended  >0.25 - <0.5        Recommended   0.1 - 0.25          Discouraged              Remeasure/reassess PCT  <0.1                Strongly Discouraged     Remeasure/reassess PCT    As 28 day mortality risk marker: \"Change in Procalcitonin Result\" (>80% or <=80%) if Day 0 (or Day 1) and Day 4 values are available. Refer to http://www.Procurifys-pct-calculator.com    Change in PCT <=80%  A decrease of PCT levels below or equal to 80% defines a positive change in PCT test result representing a " higher risk for 28-day all-cause mortality of patients diagnosed with severe sepsis for septic shock.    Change in PCT >80%  A decrease of PCT levels of more than 80% defines a negative change in PCT result representing a lower risk for 28-day all-cause mortality of patients diagnosed with severe sepsis or septic shock.       Ethanol [[id number]] Collected: 05/20/25 1820    Specimen: Blood Updated: 05/20/25 1904     Ethanol % <0.010 %     Narrative:      Plasma Ethanol Clinical Symptoms:    ETOH (%)               Clinical Symptom  .01-.05              No apparent influence  .03-.12              Euphoria, Diminished judgment and attention   .09-.25              Impaired comprehension, Muscle incoordination  .18-.30              Confusion, Staggered gait, Slurred speech  .25-.40              Markedly decreased response to stimuli, unable to stand or                        walk, vomitting, sleep or stupor  .35-.50              Comatose, Anesthesia, Subnormal body temperature        TSH Rfx On Abnormal To Free T4 [[id number]]  (Normal) Collected: 05/20/25 1820    Specimen: Blood Updated: 05/20/25 1904     TSH 2.890 uIU/mL     CK [[id number]]  (Abnormal) Collected: 05/20/25 1820    Specimen: Blood Updated: 05/20/25 1904     Creatine Kinase 269 U/L     [place] Draw [[id number]] Collected: 05/20/25 1818    Specimen: Blood Updated: 05/20/25 1901    Narrative:      The following orders were created for panel order [place] Draw.  Procedure                               Abnormality         Status                     ---------                               -----------         ------                     Green Top (Gel)[[id number]]                                                             Lavender Top[[id number]]                                                                Gold Top - SST[[id number]]                                   Final result               Light Blue Top[[id number]]                                                                 Please view results for these tests on the individual orders.    Urine Drug Screen - Urine, Clean Catch [[id number]]  (Abnormal) Collected: 05/20/25 1839    Specimen: Urine, Clean Catch Updated: 05/20/25 1900     THC, Screen, Urine Negative     Phencyclidine (PCP), Urine Negative     Cocaine Screen, Urine Negative     Methamphetamine, Ur Negative     Opiate Screen Negative     Amphetamine Screen, Urine Negative     Benzodiazepine Screen, Urine Negative     Tricyclic Antidepressants Screen Negative     Methadone Screen, Urine Negative     Barbiturates Screen, Urine Negative     Oxycodone Screen, Urine Positive     Buprenorphine, Screen, Urine Negative    Narrative:      Cutoff For Drugs Screened:    Amphetamines               500 ng/ml  Barbiturates               200 ng/ml  Benzodiazepines            150 ng/ml  Cocaine                    150 ng/ml  Methadone                  200 ng/ml  Opiates                    100 ng/ml  Phencyclidine               25 ng/ml  THC                         50 ng/ml  Methamphetamine            500 ng/ml  Tricyclic Antidepressants  300 ng/ml  Oxycodone                  100 ng/ml  Buprenorphine               10 ng/ml    The normal value for all drugs tested is negative. This report includes unconfirmed screening results, with the cutoff values listed, to be used for medical treatment purposes only.  Unconfirmed results must not be used for non-medical purposes such as employment or legal testing.  Clinical consideration should be applied to any drug of abuse test, particularly when unconfirmed results are used.    All urine drugs of abuse requests without chain of custody are for medical screening purposes only.  False positives are possible.      Ammonia [[id number]]  (Normal) Collected: 05/20/25 1820    Specimen: Blood Updated: 05/20/25 1849     Ammonia 44 umol/L     Urinalysis With Culture If Indicated - Straight Cath [[id number]]  (Abnormal) Collected: 05/20/25 1836     Specimen: Urine from Straight Cath Updated: 05/20/25 1847     Color, UA Yellow     Appearance, UA Clear     pH, UA 5.5     Specific Gravity, UA 1.015     Glucose,  mg/dL (Trace)     Ketones, UA 15 mg/dL (1+)     Bilirubin, UA Negative     Blood, UA Negative     Protein, UA Trace     Leuk Esterase, UA Negative     Nitrite, UA Negative     Urobilinogen, UA 0.2 E.U./dL    Narrative:      In absence of clinical symptoms, the presence of pyuria, bacteria, and/or nitrites on the urinalysis result does not correlate with infection.  Urine microscopic not indicated.    Carbon Monoxide, Blood [[id number]]  (Normal) Collected: 05/20/25 1840    Specimen: Blood Updated: 05/20/25 1847     Carbon Monoxide, Blood 2.6 %     aPTT [[id number]]  (Normal) Collected: 05/20/25 1820    Specimen: Blood Updated: 05/20/25 1841     PTT 28.0 seconds     Protime-INR [[id number]]  (Abnormal) Collected: 05/20/25 1820    Specimen: Blood Updated: 05/20/25 1841     Protime 15.4 Seconds      INR 1.22    CBC & Differential [[id number]]  (Abnormal) Collected: 05/20/25 1820    Specimen: Blood Updated: 05/20/25 1833    Narrative:      The following orders were created for panel order CBC & Differential.  Procedure                               Abnormality         Status                     ---------                               -----------         ------                     CBC Auto Differential[[id number]]        Abnormal            Final result                 Please view results for these tests on the individual orders.    CBC Auto Differential [[id number]]  (Abnormal) Collected: 05/20/25 1820    Specimen: Blood Updated: 05/20/25 1833     WBC 22.38 10*3/mm3      RBC 3.23 10*6/mm3      Hemoglobin 9.1 g/dL      Hematocrit 32.9 %      .9 fL      MCH 28.2 pg      MCHC 27.7 g/dL      RDW 15.7 %      RDW-SD 58.8 fl      MPV 12.0 fL      Platelets 364 10*3/mm3      Neutrophil % 86.3 %      Lymphocyte % 8.3 %      Monocyte % 2.6 %      Eosinophil %  0.1 %      Basophil % 0.1 %      Immature Grans % 2.6 %      Neutrophils, Absolute 19.30 10*3/mm3      Lymphocytes, Absolute 1.85 10*3/mm3      Monocytes, Absolute 0.58 10*3/mm3      Eosinophils, Absolute 0.03 10*3/mm3      Basophils, Absolute 0.03 10*3/mm3      Immature Grans, Absolute 0.59 10*3/mm3      nRBC 0.6 /100 WBC     Gold Top - SST [[id number]] Collected: 05/20/25 1818    Specimen: Blood Updated: 05/20/25 1831     Extra Tube Hold for add-ons.     Comment: Auto resulted.       Blood Culture - Blood, Wrist, Right [[id number]] Collected: 05/20/25 1826    Specimen: Blood from Wrist, Right Updated: 05/20/25 1828    Blood Culture - Blood, Arm, Left [[id number]] Collected: 05/20/25 1820    Specimen: Blood from Arm, Left Updated: 05/20/25 1825    POC Lactate [[id number]]  (Normal) Collected: 05/20/25 1812    Specimen: Blood Updated: 05/20/25 1814     Lactate 1.4 mmol/L      Comment: Serial Number: [id number]:  [id number]       POC Glucose Once [[id number]]  (Abnormal) Collected: 05/20/25 1803    Specimen: Blood Updated: 05/20/25 1805     Glucose 388 mg/dL      Comment: Serial Number: [id number]:  [id number]               XR Chest 1 View  Result Date: 5/21/2025  Impression: 1.New right internal jugular central venous catheter terminates in the upper right atrium. 2.Bibasilar atelectasis versus pneumonia. Electronically Signed: Clemente Dorman MD  5/21/2025 12:10 AM EDT  Workstation ID: [id number]    CT Abdomen Pelvis Without Contrast  Result Date: 5/20/2025  Impression: 1. Coronary calcification. 2. Patchy airspace opacity in the lung bases possibly secondary to atelectasis or pneumonia. 3. Generalized anasarca. Small amount of abdominal and pelvic free fluid. 4. Punctate bilateral nonobstructing renal calcifications. 5. There appear to be areas of wall thickening throughout the large and small bowel possibly secondary to enterocolitis. Ischemia is felt to be less likely given the diffuse appearing nature.  "Electronically Signed: Markos Holland MD  5/20/2025 9:57 PM EDT  Workstation ID: [id number]    CT Head Without Contrast  Result Date: 5/20/2025  Impression: New 5 cm x 2 cm area of low-attenuation in the left PCA distribution suspicious for an area of infarction. No evidence of acute hemorrhage or midline shift. Electronically Signed: Markos Holland MD  5/20/2025 8:48 PM EDT  Workstation ID: [id number]    XR Chest 1 View  Result Date: 5/20/2025  Mild bibasal infiltrates. Electronically Signed: Markos Holland MD  5/20/2025 7:38 PM EDT  Workstation ID: [id number]      CONSULTS   Consults       Date and Time Order Name Status Description    5/20/2025  8:54 PM Inpatient Neurology Consult Stroke Completed             CONDITION ON DISCHARGE    Stable    VITAL SIGNS  /84   Pulse 95   Temp 97.3 °F (36.3 °C)   Resp 22   Ht 162.6 cm (64\")   Wt 54.2 kg (119 lb 7.8 oz)   SpO2 93%   BMI 20.51 kg/m²     PHYSICAL EXAM  GEN:  Middle-aged woman who appears disheveled, acute-on-chronically ill, and much older than stated age.  Sitting up in bed on NC.  NAD.  NEURO:  Brainstem reflexes intact.  No obvious focal deficit.  Moves all 4 ext.  HEENT:  N/AT.  PERRL. Oropharynx non-erythematous.  No drainage from the eyes/ears/nose.  No conjunctival petechiae.  No oral thrush.  Auditory and visual acuity grossly wnl.  Poor dentition.  Voice normal.  NECK:  Supple, NT, trachea midline.  No meningismus.  No ROM limitation.    CHEST/LUNGS:  Breath sounds are clear and equal bilaterally.  No w/r/r.  Chest excursion equal bilaterally.    CARDIOVASCULAR:  RRR w/o murmur noted.    GI:  Abdomen soft, NT, ND, +BS.   :  No Fields cath in place.  EXTREMITIES:  No deformity or amputation.  No cyanosis, edema, or asymmetry.  Pulses 2+ and equal in BLE's.    SKIN:  Warm, dry, and pink.  No rash.  Multiple scabs on skin.  LYMPHATICS/HEME:  No overt LAD or abnormal bruising.  No lymphedema.  MSK:  Normal ROM.  No joint abnormalities noted.  Strength is " "5/5 and equal in BUE and BLE's.  PSYCH:  A&Ox 3.  Normal mood and flat affect.  Responds appropriately to commands and appears to comprehend instructions.        DISCHARGE DISPOSITION  Hospice/Medical Facility ([facility])    DISCHARGE MEDICATIONS  Inter-facility transfer medications (From admission, onward)               diazePAM (VALIUM) injection 5 mg  ( IP MEDS DIAZEPAM IV/IM AND LORAZEPAM PO/SL LINKED (OR) PANEL)  Every 1 Hour PRN             scopolamine patch 1 mg/72 hr  Every 72 Hours PRN             atropine 1 % ophthalmic solution 2 drop  2 Times Daily PRN             glycopyrrolate (ROBINUL) injection 0.4 mg  (glycopyrrolate (ROBINUL) IV/SQ )  Every 2 Hours PRN             diphenoxylate-atropine (LOMOTIL) 2.5-0.025 MG per tablet 1 tablet  Every 2 Hours PRN             Polyvinyl Alcohol-Povidone PF (ARTIFICIAL TEARS) 1.4-0.6 % ophthalmic solution 1 drop  Every 30 Minutes PRN             acetaminophen (TYLENOL) tablet 650 mg  (acetaminophen (TYLENOL) PO/UT)  Every 4 Hours PRN        Placed in \"Or\" Linked Group        acetaminophen (TYLENOL) 160 MG/5ML oral solution 650 mg  (acetaminophen (TYLENOL) PO/UT)  Every 4 Hours PRN        Placed in \"Or\" Linked Group        acetaminophen (TYLENOL) suppository 650 mg  (acetaminophen (TYLENOL) PO/UT)  Every 4 Hours PRN        Placed in \"Or\" Linked Group        HYDROmorphone (DILAUDID) injection 1 mg  (hydromorphone (DILAUDID) IV or SQ)  Every 1 Hour PRN                              DISCHARGE DIET   NPO    ACTIVITY AT DISCHARGE   Bedrest      FOLLOW-UP APPOINTMENTS  No future appointments.        TEST RESULTS PENDING AT DISCHARGE  Pending Results       Procedure [Order ID] Specimen - Date/Time    Blood Culture - Blood, Arm, Left [[id number]] Collected: 05/20/25 1820    Specimen: Blood from Arm, Left Updated: 05/20/25 1825    Blood Culture - Blood, Wrist, Right [[id number]] Collected: 05/20/25 1826    Specimen: Blood from Wrist, Right Updated: 05/20/25 1828    " Vitamin B12 [[id number]] Collected: 05/20/25 1818    Specimen: Blood Updated: 05/21/25 1415              This note completed by this nurse practitioner on behalf of the critical care team, at the request of Dr. Lester, who agrees with transition in this new plan of care.    Time: Discharge 32 min    Bonifacio Benjamin, APRN  5/21/2025

## 2025-05-21 NOTE — PROGRESS NOTES
"Pharmacy Antimicrobial Dosing Service    Subjective:  Sheryl Stuart is a 54 y.o.female admitted with altered mental status. Pharmacy has been consulted to dose Vancomycin for empiric therapy.      Assessment/Plan    1. Day #1 Vancomycin: Pulse dosing d/t renal dysfxn. 1000mg (~20mg/kg ABW) IV x1 dose.  Random level 5/21 at 0800.  Re-dose when less than 20.    2. Day #1 Cefepime: 2000mg IV q24h for estCrCl < 30 mL/min.    Will continue to monitor drug levels, renal function, culture and sensitivities, and patient clinical status.       Objective:  Relevant clinical data and objective history reviewed:  162.6 cm (64\")   49.9 kg (110 lb)   Ideal body weight: 54.7 kg (120 lb 9.5 oz)  Body mass index is 18.88 kg/m².        Results from last 7 days   Lab Units 05/20/25 2003 05/20/25  1820   CREATININE mg/dL 2.12* 2.27*     Estimated Creatinine Clearance: 24.2 mL/min (A) (by C-G formula based on SCr of 2.12 mg/dL (H)).  No intake/output data recorded.    Results from last 7 days   Lab Units 05/20/25  1820   WBC 10*3/mm3 22.38*     Temperature    05/20/25 1904 05/20/25 2018 05/20/25 2106   Temp: 92.7 °F (33.7 °C) 92.3 °F (33.5 °C) 92.9 °F (33.8 °C)     Baseline culture/source/susceptibility:  Microbiology Results (last 10 days)       ** No results found for the last 240 hours. **            Ventura Cee  05/20/25 23:11 EDT    "

## 2025-05-21 NOTE — PROGRESS NOTES
Critical Care Progress Note     Sheryl Stuart : 1970 MRN:[id number] LOS:1  Rm: 230/     Principal Problem: DKA (diabetic ketoacidosis)     Reason for follow up: All the medical problems listed below    Summary     54 y.o. female with PMH of type 1 diabetes mellitus, non-STEMI, hypertension, GERD, chronic pain, COPD, and anxiety presented to the hospital for confusion.  Per report, family stated that the patient had been in bed and not eating or drinking x4 days. In the ED the patient was able to state her name only and follow some simple commands. Labs remarkable for ABG 6.988/7.5/120/1.8, , troponin 95, repeat troponin 88, anion gap 36.6, creatinine 2.27, glucose 397, calcium 8.2, phosphorus 6.6, alkaline phosphatase 359, albumin 2.2, hemoglobin A1c 10.3, osmolality 333, procalcitonin 1.17, PT 15.4, INR 1.22, WBC 22.38, hemoglobin 9.1.  UA showed trace amount of glucose, ketonuria, trace proteinuria.  UDS was positive for oxycodone which the patient is prescribed.  She received 2 A of bicarb, a full sepsis bolus, 1 additional liter IVF, cefepime, vancomycin, and was started on norepinephrine for persistent hypotension.  CT head showed a new 5 cm x 2 cm area of low-attenuation in the left PCA distribution suspicious for an area of infarction. No evidence of acute hemorrhage or midline shift. Her last known well time was days ago. Neurology consulted. CT abdomen/pelvis pending. CXR no infiltrates or effusions.   She was admitted to the ICU with a principal diagnosis of DKA (diabetic ketoacidosis).       ACP: No ACP documentation on file. Code status clarified with NOK, daughter, while patient's ex- that she lives with agrees, DNR/DNI.    Patient is on Hospital Day: 2.    Significant Events / Subjective     25 : Pt afeb, VSS.  A&Ox4.  Has only had 700 mL of UOP since admission, net +4.6 L.  Cr at baseline.  CO2 up to 14, AG remains 21.  Hb 5.7--transfused and came up to 8.  Pt has  elected that she does not wish to continue any further treatment and has asked for hospice.  Pt's daughter agreed with this when RN spoke with her on the phone.  Will likely go home with hospice.  Continue current treatment measures for now; will not escalate care.    Assessment / Plan     Diabetic ketoacidosis without coma, with history of diabetes mellitus, type 1  Metabolic acidosis  Etiology unclear  Anion gap of 36.6 on admission.  A1c 10.30  ABG 6.988/7 point 5/120/1.8  2 amps of sodium bicarbonate in ED  DKA protocol initiated  Insulin drip initiated per DKA protocol  Adjust iv fluids based on glucose, sodium, and potassium per protocol  Accu-Cheks every hour and serial labs as ordered  Continue protocol until resolved per protocol recommendations  Patient received a total of 3,497mL IVF boluses     Suspected acute ischemic stroke  Not a candidate for TNK d/t last know well time > 4.5 hours ago, LKW time was over 2-3 days ago  Ischemic Stroke orders initiated.  CT scan reviewed: New 5 cm x 2 cm area of low-attenuation in the left PCA distribution suspicious for an area of infarction. No evidence of acute hemorrhage or midline shift   ECHO with bubble study ordered    Check LDL and A1c.  Start moderate/high intensity statins  Neurology consulted, will see patient in the AM, did not want additional scans tonight  Observe for signs and symptoms of bleeding, if observed, immediately notify Neurology  NPO per Code Stroke Protocol, may advance diet if patient passes nursing bedside swallow evaluation  PT/ST/OT evaluation & treatment     Shock, hypovolemia vs septic ( WBC>12 or <4 or >10% bands, Temp > 100.4 or < 96.8, HR>90, Creatinine >2, Change in mental status, and MAP<65 or SBP<90 )  Hypoalbuminemia  Unclear etiology, will rule out infectious source  Patient is in DKA and reportedly has not been eating or drinking for at least 2 days  WBC 22.38, no bandemia, lactate 1.4, procalcitonin 1.17  Will do blood /  urine / sputum cultures  CXR no infiltrates or effusions  CT abdomen/pelvis: Patchy airspace opacity in the lung bases possibly secondary to atelectasis or pneumonia. Generalized anasarca. There appear to be areas of wall thickening throughout the large and small bowel possibly secondary to enterocolitis. Ischemia is felt to be less likely given the diffuse appearing nature   GI PCR ordered  Started on cefepime and vancomycin  S/p sepsis bolus, plus 2 additional liters IVF, totaling 3497mL, persistent hypotension in spite of adequate fluid resuscitation  25g/25% albumin ordered q6h x3 doses  C/w additional fluids as needed. Avoid fluid overload.   On levophed, titrate vasopressors for a target MAP of 65.      Acute Kidney Injury  Elevated CK  Remains nonoliguric. Baseline creatinine 0.9-1  Creatinine 2.27 on admit  CT: Punctate bilateral nonobstructing renal calcifications.   Likely prerenal. Possible intrinsic component due to ATN from hypotension and potential infection  C/w IV fluids as ordered  Monitor Input/Output very closely  Avoids NSAIDs, nephrotoxic medications, and hypotension  Net IO Since Admission: 100 mL [05/21/25 0035]      Hypothermia  Hypothermic on admission, as low as 92.3F  Rebel hugger as need to achieve normothermia  TSH WNL 2.89     Hypocalcemia: 7.7, ionized calcium ordered, replace as needed     COPD: prn duonebs, on 2L nasal cannula, wean as tolerated, not hypoxemic  Hyperlipidemia: lipid panel in AM, patient does not appear to be on a statin per fill history  GERD: PPI  Chronic pain: continue prn oxycodone when appropriate  Hx NSTEMI, s/p PCI  Hx chronic diarrhea       Disposition:  Will downgrade to [facility] after she meets with hospice.    Code status:   Code Status (Patient has no pulse and is not breathing): No CPR (Do Not Attempt to Resuscitate)  Medical Interventions (Patient has pulse or is breathing): Limited Support  Medical Intervention Limits: No intubation (DNI)  Level Of Support  Discussed With: Next of Kin (If No Surrogate)       Nutrition:   NPO Diet NPO Type: Strict NPO   Patient isn't on Tube Feeding     VTE Prophylaxis:  Pharmacologic & mechanical VTE prophylaxis orders are present.           Objective / Physical Exam     Vital signs:  Temp: 97.3 °F (36.3 °C)  BP: 142/84  Heart Rate: 95  Resp: 22  SpO2: 93 %  Weight: 54.2 kg (119 lb 7.8 oz)    Admission Weight: Weight: 49.9 kg (110 lb)  Current Weight: Weight: 54.2 kg (119 lb 7.8 oz)    Input/Output in last 24 hours:    Intake/Output Summary (Last 24 hours) at 5/21/2025 1329  Last data filed at 5/21/2025 1321  Gross per 24 hour   Intake 5344.04 ml   Output 704 ml   Net 4640.04 ml      Net IO Since Admission: 4,640.04 mL [05/21/25 1329]     Physical Exam     GEN:  Middle-aged woman who appears disheveled, acute-on-chronically ill, and much older than stated age.  Sitting up in bed on NC.  NAD.  NEURO:  Brainstem reflexes intact.  No obvious focal deficit.  Moves all 4 ext.  HEENT:  N/AT.  PERRL.  MMM.  Oropharynx non-erythematous.  No drainage from the eyes/ears/nose.  No conjunctival petechiae.  No oral thrush.  Auditory and visual acuity grossly wnl.  Poor dentition.  Voice normal.  NECK:  Supple, NT, trachea midline.  No meningismus.  No ROM limitation.  No torticollis.  No JVD.  No thyromegaly.    CHEST/LUNGS:  Breath sounds are clear and equal bilaterally.  No w/r/r.  Chest excursion equal bilaterally.    CARDIOVASCULAR:  RRR w/o murmur noted.    GI:  Abdomen soft, NT, ND, +BS.   :  No Fields cath in place.  EXTREMITIES:  No deformity or amputation.  No cyanosis, edema, or asymmetry.  Pulses 2+ and equal in BLE's.    SKIN:  Warm, dry, and pink.  No rash.  Multiple scabs on skin.  LYMPHATICS/HEME:  No overt LAD or abnormal bruising.  No lymphedema.  MSK:  Normal ROM.  No joint abnormalities noted.  Strength is 5/5 and equal in BUE and BLE's.  PSYCH:  A&Ox 3.  Normal mood and flat affect.  Responds appropriately to commands and  appears to comprehend instructions.            Radiology and Labs     Results from last 7 days   Lab Units 05/21/25  1203 05/21/25  0542 05/21/25  0520 05/20/25  1820   WBC 10*3/mm3  --  12.92* 13.31* 22.38*   HEMOGLOBIN g/dL 8.3* 5.7* 5.8* 9.1*   HEMATOCRIT % 26.5* 18.6* 19.2* 32.9*   PLATELETS 10*3/mm3  --  183 192 364      Results from last 7 days   Lab Units 05/20/25  1820   PROTIME Seconds 15.4*   INR  1.22*   APTT seconds 28.0      Results from last 7 days   Lab Units 05/21/25  1203 05/21/25  0841 05/21/25  0452 05/21/25  0053 05/20/25 2003 05/20/25  1820   SODIUM mmol/L 143 143 143 145 141 138   POTASSIUM mmol/L 3.7 3.4* 3.7 3.8 4.7 4.8   CHLORIDE mmol/L 108* 105 105 106 102 98   CO2 mmol/L 14.4* 10.5* 9.6* 6.0* 3.1* 3.4*   ANION GAP mmol/L 20.6* 27.5* 28.4* 33.0* 35.9* 36.6*   BUN mg/dL 17 17 17 17 17 18   CREATININE mg/dL 2.06* 2.05* 2.04* 1.97* 2.12* 2.27*   GLUCOSE mg/dL 160* 202* 230* 245* 377* 397*   PHOSPHORUS mg/dL 2.3* 2.5 3.2 4.5 6.2* 6.6*   MAGNESIUM mg/dL 1.7 1.7 1.9 1.9 2.2 2.3   ALT (SGPT) U/L  --   --  13  --   --  18   AST (SGOT) U/L  --   --  29  --   --  23   ALK PHOS U/L  --   --  235*  --   --  359*      Results from last 7 days   Lab Units 05/21/25  0452 05/20/25  1820   ALT (SGPT) U/L 13 18   AST (SGOT) U/L 29 23   ALK PHOS U/L 235* 359*     Results from last 7 days   Lab Units 05/20/25  2334 05/20/25  1908   PH, ARTERIAL pH units  --  6.988*   PCO2, ARTERIAL mm Hg  --  7.5*   PO2 ART mm Hg  --  120.6*   O2 SATURATION ART %  --  96.0   FIO2 % 21 21   HCO3 ART mmol/L  --  1.8*   BASE EXCESS ART mmol/L  --  -27.5*       XR Chest 1 View  Result Date: 5/21/2025  Impression: 1.New right internal jugular central venous catheter terminates in the upper right atrium. 2.Bibasilar atelectasis versus pneumonia. Electronically Signed: Clemente Dorman MD  5/21/2025 12:10 AM EDT  Workstation ID: [id number]    CT Abdomen Pelvis Without Contrast  Result Date: 5/20/2025  Impression: 1. Coronary  calcification. 2. Patchy airspace opacity in the lung bases possibly secondary to atelectasis or pneumonia. 3. Generalized anasarca. Small amount of abdominal and pelvic free fluid. 4. Punctate bilateral nonobstructing renal calcifications. 5. There appear to be areas of wall thickening throughout the large and small bowel possibly secondary to enterocolitis. Ischemia is felt to be less likely given the diffuse appearing nature. Electronically Signed: Markos Holland MD  5/20/2025 9:57 PM EDT  Workstation ID: [id number]    CT Head Without Contrast  Result Date: 5/20/2025  Impression: New 5 cm x 2 cm area of low-attenuation in the left PCA distribution suspicious for an area of infarction. No evidence of acute hemorrhage or midline shift. Electronically Signed: Markos Holland MD  5/20/2025 8:48 PM EDT  Workstation ID: [id number]    XR Chest 1 View  Result Date: 5/20/2025  Mild bibasal infiltrates. Electronically Signed: Markos Holland MD  5/20/2025 7:38 PM EDT  Workstation ID: [id number]      Current medications     Scheduled Meds:   aspirin, 325 mg, Oral, Daily   Or  aspirin, 300 mg, Rectal, Daily  atorvastatin, 80 mg, Oral, Nightly  [Held by provider] carbidopa-levodopa, 1 tablet, Oral, BID  [Held by provider] carvedilol, 12.5 mg, Oral, BID With Meals  cefTRIAXone, 1,000 mg, Intravenous, Q24H  heparin (porcine), 5,000 Units, Subcutaneous, Q8H  mupirocin, 1 Application, Each Nare, BID  pantoprazole, 40 mg, Intravenous, Q AM  senna-docusate sodium, 2 tablet, Oral, BID  sodium chloride, 10 mL, Intravenous, Q12H  sodium chloride, 10 mL, Intravenous, Q12H  sodium chloride, 10 mL, Intravenous, Q12H        Continuous Infusions:   dextrose 5 % and sodium chloride 0.45 %, 125 mL/hr  dextrose 5 % and sodium chloride 0.45 % with KCl 20 mEq/L, 125 mL/hr, Last Rate: 125 mL/hr (05/21/25 1312)  dextrose 5 % and sodium chloride 0.45 % with KCl 40 mEq/L, 125 mL/hr  dextrose 5 % and sodium chloride 0.9 %, 125 mL/hr  dextrose 5 % and sodium  chloride 0.9 % with KCl 20 mEq, 125 mL/hr  dextrose 5% and sodium chloride 0.9% with KCl 40 mEq/L, 125 mL/hr  insulin, 0-100 Units/hr, Last Rate: 12.1 Units/hr (05/21/25 1317)  sodium chloride 0.45 % 1,000 mL with potassium chloride 40 mEq infusion, 200 mL/hr  sodium chloride, 200 mL/hr  sodium chloride 0.45 % with KCl 20 mEq, 200 mL/hr  sodium chloride, 200 mL/hr  sodium chloride 0.9 % with KCl 20 mEq, 200 mL/hr, Last Rate: Stopped (05/21/25 0143)  sodium chloride 0.9 % with KCl 40 mEq/L, 200 mL/hr          Plan discussed with RN. Reviewed all other data in the last 24 hours, including but not limited to vitals, labs, microbiology, imaging and pertinent notes from other providers.  Plan also discussed with pt at the bedside.      Ladarius Lester, DO   Critical Care  05/21/25   13:29 EDT

## 2025-05-21 NOTE — ED NOTES
Vancomycin infiltrated in R arm, IV pulled, order set placed, site marked. RN continuing to monitor. Fluids could be seen weeping through skin.

## 2025-05-21 NOTE — SIGNIFICANT NOTE
05/21/25 1030   Living Situation   Current Living Arrangements home   Potentially Unsafe Housing Conditions none   Food Insecurity   Within the past 12 months, you worried that your food would run out before you got the money to buy more. Never true   Within the past 12 months, the food you bought just didn't last and you didn't have money to get more. Never true   Transportation Needs   In the past 12 months, has lack of transportation kept you from medical appointments or from getting medications? no   In the past 12 months, has lack of transportation kept you from meetings, work, or from getting things needed for daily living? No   Utilities   In the past 12 months has the electric, gas, oil, or water company threatened to shut off services in your home? No   Abuse Screen (yes response referral indicated)   Feels Unsafe at Home or Work/School no   Feels Threatened by Someone no   Does Anyone Try to Keep You From Having Contact with Others or Doing Things Outside Your Home? no   Physical Signs of Abuse Present no   Financial Resource Strain   How hard is it for you to pay for the very basics like food, housing, medical care, and heating? Not very   Employment   Do you want help finding or keeping work or a job? I do not need or want help   Family and Community Support   If for any reason you need help with day-to-day activities such as bathing, preparing meals, shopping, managing finances, etc., do you get the help you need? I get all the help I need   How often do you feel lonely or isolated from those around you? Often   Education   Preferred Language English   Do you want help with school or training? For example, starting or completing job training or getting a high school diploma, GED or equivalent No   Physical Activity   On average, how many days per week do you engage in moderate to strenuous exercise (like a brisk walk)? 0 days   On average, how many minutes do you engage in exercise at this level? 0  min   Number of minutes of exercise per week (!) 0   Alcohol Use   Q1: How often do you have a drink containing alcohol? Never   Q2: How many drinks containing alcohol do you have on a typical day when you are drinking? None   Q3: How often do you have six or more drinks on one occasion? Never   Stress   Do you feel stress - tense, restless, nervous, or anxious, or unable to sleep at night because your mind is troubled all the time - these days? Not at all   Mental Health   Little interest or pleasure in doing things Several days   Feeling down, depressed, or hopeless Several days   Disabilities   Difficulty Concentrating, Remembering or Making Decisions yes   Concentration Management sometimes   Difficulty Managing Errands Independently yes   Errands Management SO does the driving

## 2025-05-22 NOTE — PROGRESS NOTES
[facility] MEDICINE SERVICE  DAILY PROGRESS NOTE    NAME: Sheryl Stuart  : 1970  MRN: [id number]      LOS: 1 day     PROVIDER OF SERVICE: Candace Carpio MD    Chief Complaint: End of life care    Subjective:   H&P, consults, labs and imaging reviewed  Interval History:    Patient seen and evaluated at bedside.     Patient looks comfortable    Review of Systems:       Objective:     Vital Signs  Temp:  [95.2 °F (35.1 °C)-97 °F (36.1 °C)] 97 °F (36.1 °C)  Heart Rate:  [79-97] 85  BP: ()/(53-68) 103/63   There is no height or weight on file to calculate BMI.    Physical Exam   General: Sedated  Neuro: no focal deficits appreciated  Head: Atraumatic, normocephalic  HEENT: anicteric, normal sclerae and conjunctivae,  Neck: supple,  CV: RRR, soft heart sounds,   Pulm: Decreased breath sounds, no increased work of breathing  Abd: Soft, nontender, nondistended  Skin: Warm, dry       Scheduled Meds       PRN Meds     acetaminophen **OR** acetaminophen **OR** acetaminophen    atropine    diazePAM    diphenoxylate-atropine    glycopyrrolate    HYDROmorphone    Polyvinyl Alcohol-Povidone PF    Scopolamine   Infusions         Diagnostic Data    Results from last 7 days   Lab Units 25  1203 25  0841 25  0542 25  0520 25  0452   WBC 10*3/mm3  --   --  12.92*   < >  --    HEMOGLOBIN g/dL 8.3*  --  5.7*   < >  --    HEMATOCRIT % 26.5*  --  18.6*   < >  --    PLATELETS 10*3/mm3  --   --  183   < >  --    GLUCOSE mg/dL 160*   < >  --   --  230*   CREATININE mg/dL 2.06*   < >  --   --  2.04*   BUN mg/dL 17   < >  --   --  17   SODIUM mmol/L 143   < >  --   --  143   POTASSIUM mmol/L 3.7   < >  --   --  3.7   AST (SGOT) U/L  --   --   --   --  29   ALT (SGPT) U/L  --   --   --   --  13   ALK PHOS U/L  --   --   --   --  235*   BILIRUBIN mg/dL  --   --   --   --  0.3   ANION GAP mmol/L 20.6*   < >  --   --  28.4*    < > = values in this interval not displayed.       XR Chest 1  View  Result Date: 5/21/2025  Impression: 1.New right internal jugular central venous catheter terminates in the upper right atrium. 2.Bibasilar atelectasis versus pneumonia. Electronically Signed: Clemente Dorman MD  5/21/2025 12:10 AM EDT  Workstation ID: [id number]    CT Abdomen Pelvis Without Contrast  Result Date: 5/20/2025  Impression: 1. Coronary calcification. 2. Patchy airspace opacity in the lung bases possibly secondary to atelectasis or pneumonia. 3. Generalized anasarca. Small amount of abdominal and pelvic free fluid. 4. Punctate bilateral nonobstructing renal calcifications. 5. There appear to be areas of wall thickening throughout the large and small bowel possibly secondary to enterocolitis. Ischemia is felt to be less likely given the diffuse appearing nature. Electronically Signed: Markos Holland MD  5/20/2025 9:57 PM EDT  Workstation ID: [id number]    CT Head Without Contrast  Result Date: 5/20/2025  Impression: New 5 cm x 2 cm area of low-attenuation in the left PCA distribution suspicious for an area of infarction. No evidence of acute hemorrhage or midline shift. Electronically Signed: Markos Holland MD  5/20/2025 8:48 PM EDT  Workstation ID: [id number]    XR Chest 1 View  Result Date: 5/20/2025  Mild bibasal infiltrates. Electronically Signed: Markos Holland MD  5/20/2025 7:38 PM EDT  Workstation ID: [id number]      Interval results reviewed.    Assessment/Plan:   End of life care  DKA/type 1 diabetes mellitus  Non-STEMI  Sepsis  Metabolic acidosis with high anion gap  Elevated LFTs  Anemia  Hypertension  GERD  COPD  Chronic pain    Under GIP hospice with [facility]  Comfort medications ordered PRN including valium, robinul, dilaudid  Labs discontinued  All care is to be geared toward comfort at this time  Diet as tolerated if aligns with comfort         Treatment plan discussed with RN.     VTE Prophylaxis:  No VTE prophylaxis order currently exists.         Code status is   Code Status and  Medical Interventions: No CPR (Do Not Attempt to Resuscitate); Comfort Measures   Ordered at: 05/21/25 1704     Code Status (Patient has no pulse and is not breathing):    No CPR (Do Not Attempt to Resuscitate)     Medical Interventions (Patient has pulse or is breathing):    Comfort Measures     Level Of Support Discussed With:    Next of Kin (If No Surrogate)       Plan for disposition:     Barriers to Discharge: Hospice care  Anticipated Date of Discharge: Unknown  Place of Discharge: Unknown      Time: 40 minutes     Signature: Electronically signed by Candace Carpio MD, 05/22/25, 11:21 EDT.  [facility] Hospitalist Team

## 2025-05-22 NOTE — CASE MANAGEMENT/SOCIAL WORK
Discharge Planning Assessment   Sudarshan     Patient Name: Sheryl Stuart  MRN: [id number]  Today's Date: 5/22/2025    Admit Date: 5/21/2025    Plan: GIP with [facility].   Discharge Needs Assessment       Row Name 05/22/25 1419       Living Environment    People in Home significant other    Current Living Arrangements home    Potentially Unsafe Housing Conditions none    In the past 12 months has the electric, gas, oil, or water company threatened to shut off services in your home? No    Primary Care Provided by self    Provides Primary Care For no one    Family Caregiver if Needed significant other;child(javi), adult    Quality of Family Relationships unable to assess    Able to Return to Prior Arrangements yes       Resource/Environmental Concerns    Resource/Environmental Concerns none    Transportation Concerns none       Transportation Needs    In the past 12 months, has lack of transportation kept you from medical appointments or from getting medications? no    In the past 12 months, has lack of transportation kept you from meetings, work, or from getting things needed for daily living? No       Food Insecurity    Within the past 12 months, you worried that your food would run out before you got the money to buy more. Never true    Within the past 12 months, the food you bought just didn't last and you didn't have money to get more. Never true       Transition Planning    Patient/Family Anticipates Transition to inpatient hospice    Patient/Family Anticipated Services at Transition hospice care    Transportation Anticipated family or friend will provide;health plan transportation       Discharge Needs Assessment    Readmission Within the Last 30 Days no previous admission in last 30 days    Current Outpatient/Agency/Support Group hospice facility    Equipment Currently Used at Home none    Concerns to be Addressed discharge planning;care coordination/care conferences    Do you want help finding or  keeping work or a job? Patient declined    Do you want help with school or training? For example, starting or completing job training or getting a high school diploma, GED or equivalent No    Anticipated Changes Related to Illness none    Equipment Needed After Discharge none    Provided Post Acute Provider List? N/A    Provided Post Acute Provider Quality & Resource List? N/A                   Discharge Plan       Row Name 05/22/25 1419       Plan    Plan GIP with [facility].    Patient/Family in Agreement with Plan unable to assess    Plan Comments CM received update from [facility] liaison Jess that pt currently refusing any comfort meds. Will need to discuss discharge plans to either home or nursing home facility if pt continues to refuse.              Demographic Summary       Row Name 05/22/25 1418       General Information    Admission Type inpatient    Arrived From hospital    Referral Source admission list    Reason for Consult care coordination/care conference;discharge planning    Preferred Language English       Contact Information    Permission Granted to Share Info With                    Functional Status       Row Name 05/22/25 1418       Functional Status    Usual Activity Tolerance moderate    Current Activity Tolerance moderate       Functional Status, IADL    Medications independent    Meal Preparation independent    Housekeeping independent    Laundry independent    Shopping independent    If for any reason you need help with day-to-day activities such as bathing, preparing meals, shopping, managing finances, etc., do you get the help you need? I could use a little more help             Megan Naegele, RN     Office Phone: [phone]  Office Cell: [phone]

## 2025-05-22 NOTE — CASE MANAGEMENT/SOCIAL WORK
Continued Stay Note   Sudarshan     Patient Name: Sheryl Stuart  MRN: [id number]  Today's Date: 5/22/2025    Admit Date: 5/21/2025    Plan: [facility]IP - [facility]   Discharge Plan       Row Name 05/22/25 0827       Plan    Plan [facility] - [facility]    Plan Comments GIP - [facility]    Final Discharge Disposition Code --    Final Note --               ANTHONY Rios RN  ICU/CVU   O: [phone]  C: [phone]  [email or web address]

## 2025-05-22 NOTE — PAYOR COMM NOTE
"CHANGED TO [facility] ON 05/21/2025:        AUTHORIZATION PENDING:   PLEASE CALL OR FAX DETERMINATION TO CONTACT BELOW. THANK YOU.        Soraida Brink, RN MSN  /UR  [facility]  [phone] office  [phone] fax  [email or web address]    [facility]  NPI: [id number]  Tax: [phone]          Sheryl Stuart (54 y.o. Female)       Date of Birth   1970    Social Security Number       Address   [address] [facility] [place] [facility]    Home Phone   [phone]    MRN   [id number]       Sabianism   None    Marital Status                               Admission Date   5/20/2025    Admission Type   Emergency    Admitting Provider   Ladarius Lester DO    Attending Provider       Department, Room/Bed   [facility] INTENSIVE CARE UNIT, 2302/1       Discharge Date   5/21/2025    Discharge Disposition   Hospice/Medical Facility ([facility])    Discharge Destination                                 Attending Provider: (none)   Allergies: Penicillins    Isolation: None   Infection: None   Code Status: No CPR    Ht: 162.6 cm (64\")   Wt: 54.2 kg (119 lb 7.8 oz)    Admission Cmt: None   Principal Problem: DKA (diabetic ketoacidosis) [E11.10]                   Active Insurance as of 5/20/2025       Primary Coverage       Payor Plan Insurance Group Employer/Plan Group    [employer] [place] - [employer] [id number]       Payor Plan Address Payor Plan Phone Number Payor Plan Fax Number Effective Dates    MAIL STOP:   4/1/2017 - None Entered    [address] [address]       [facility] [address]         Subscriber Name Subscriber Birth Date Member ID       SHERYL STUART 1970 [id number]                     Emergency Contacts        (Rel.) Home Phone Work Phone Mobile Phone    Gladis Snyder (Daughter) -- -- [phone]    shawn stuart (Significant Other) -- -- [phone]                 Physician " Progress Notes (last 24 hours)        Ladarius Lester,  at 25 1328            Critical Care Progress Note     Sheryl Stuart : 1970 MRN:[id number] LOS:1  Rm: /     Principal Problem: DKA (diabetic ketoacidosis)     Reason for follow up: All the medical problems listed below    Summary     54 y.o. female with PMH of type 1 diabetes mellitus, non-STEMI, hypertension, GERD, chronic pain, COPD, and anxiety presented to the hospital for confusion.  Per report, family stated that the patient had been in bed and not eating or drinking x4 days. In the ED the patient was able to state her name only and follow some simple commands. Labs remarkable for ABG 6.988/7.5/120/1.8, , troponin 95, repeat troponin 88, anion gap 36.6, creatinine 2.27, glucose 397, calcium 8.2, phosphorus 6.6, alkaline phosphatase 359, albumin 2.2, hemoglobin A1c 10.3, osmolality 333, procalcitonin 1.17, PT 15.4, INR 1.22, WBC 22.38, hemoglobin 9.1.  UA showed trace amount of glucose, ketonuria, trace proteinuria.  UDS was positive for oxycodone which the patient is prescribed.  She received 2 A of bicarb, a full sepsis bolus, 1 additional liter IVF, cefepime, vancomycin, and was started on norepinephrine for persistent hypotension.  CT head showed a new 5 cm x 2 cm area of low-attenuation in the left PCA distribution suspicious for an area of infarction. No evidence of acute hemorrhage or midline shift. Her last known well time was days ago. Neurology consulted. CT abdomen/pelvis pending. CXR no infiltrates or effusions.   She was admitted to the ICU with a principal diagnosis of DKA (diabetic ketoacidosis).       ACP: No ACP documentation on file. Code status clarified with NOK, daughter, while patient's ex- that she lives with agrees, DNR/DNI.    Patient is on Hospital Day: 2.    Significant Events / Subjective     25 : Pt afeb, VSS.  A&Ox4.  Has only had 700 mL of UOP since admission, net +4.6 L.  Cr at  baseline.  CO2 up to 14, AG remains 21.  Hb 5.7--transfused and came up to 8.  Pt has elected that she does not wish to continue any further treatment and has asked for hospice.  Pt's daughter agreed with this when RN spoke with her on the phone.  Will likely go home with hospice.  Continue current treatment measures for now; will not escalate care.    Assessment / Plan     Diabetic ketoacidosis without coma, with history of diabetes mellitus, type 1  Metabolic acidosis  Etiology unclear  Anion gap of 36.6 on admission.  A1c 10.30  ABG 6.988/7 point 5/120/1.8  2 amps of sodium bicarbonate in ED  DKA protocol initiated  Insulin drip initiated per DKA protocol  Adjust iv fluids based on glucose, sodium, and potassium per protocol  Accu-Cheks every hour and serial labs as ordered  Continue protocol until resolved per protocol recommendations  Patient received a total of 3,497mL IVF boluses     Suspected acute ischemic stroke  Not a candidate for TNK d/t last know well time > 4.5 hours ago, LKW time was over 2-3 days ago  Ischemic Stroke orders initiated.  CT scan reviewed: New 5 cm x 2 cm area of low-attenuation in the left PCA distribution suspicious for an area of infarction. No evidence of acute hemorrhage or midline shift   ECHO with bubble study ordered    Check LDL and A1c.  Start moderate/high intensity statins  Neurology consulted, will see patient in the AM, did not want additional scans tonight  Observe for signs and symptoms of bleeding, if observed, immediately notify Neurology  NPO per Code Stroke Protocol, may advance diet if patient passes nursing bedside swallow evaluation  PT/ST/OT evaluation & treatment     Shock, hypovolemia vs septic ( WBC>12 or <4 or >10% bands, Temp > 100.4 or < 96.8, HR>90, Creatinine >2, Change in mental status, and MAP<65 or SBP<90 )  Hypoalbuminemia  Unclear etiology, will rule out infectious source  Patient is in DKA and reportedly has not been eating or drinking for at least  2 days  WBC 22.38, no bandemia, lactate 1.4, procalcitonin 1.17  Will do blood / urine / sputum cultures  CXR no infiltrates or effusions  CT abdomen/pelvis: Patchy airspace opacity in the lung bases possibly secondary to atelectasis or pneumonia. Generalized anasarca. There appear to be areas of wall thickening throughout the large and small bowel possibly secondary to enterocolitis. Ischemia is felt to be less likely given the diffuse appearing nature   GI PCR ordered  Started on cefepime and vancomycin  S/p sepsis bolus, plus 2 additional liters IVF, totaling 3497mL, persistent hypotension in spite of adequate fluid resuscitation  25g/25% albumin ordered q6h x3 doses  C/w additional fluids as needed. Avoid fluid overload.   On levophed, titrate vasopressors for a target MAP of 65.      Acute Kidney Injury  Elevated CK  Remains nonoliguric. Baseline creatinine 0.9-1  Creatinine 2.27 on admit  CT: Punctate bilateral nonobstructing renal calcifications.   Likely prerenal. Possible intrinsic component due to ATN from hypotension and potential infection  C/w IV fluids as ordered  Monitor Input/Output very closely  Avoids NSAIDs, nephrotoxic medications, and hypotension  Net IO Since Admission: 100 mL [05/21/25 0035]      Hypothermia  Hypothermic on admission, as low as 92.3F  Rebel hugger as need to achieve normothermia  TSH WNL 2.89     Hypocalcemia: 7.7, ionized calcium ordered, replace as needed     COPD: prn duonebs, on 2L nasal cannula, wean as tolerated, not hypoxemic  Hyperlipidemia: lipid panel in AM, patient does not appear to be on a statin per fill history  GERD: PPI  Chronic pain: continue prn oxycodone when appropriate  Hx NSTEMI, s/p PCI  Hx chronic diarrhea       Disposition:  Will downgrade to GIP after she meets with hospice.    Code status:   Code Status (Patient has no pulse and is not breathing): No CPR (Do Not Attempt to Resuscitate)  Medical Interventions (Patient has pulse or is breathing):  Limited Support  Medical Intervention Limits: No intubation (DNI)  Level Of Support Discussed With: Next of Kin (If No Surrogate)       Nutrition:   NPO Diet NPO Type: Strict NPO   Patient isn't on Tube Feeding     VTE Prophylaxis:  Pharmacologic & mechanical VTE prophylaxis orders are present.           Objective / Physical Exam     Vital signs:  Temp: 97.3 °F (36.3 °C)  BP: 142/84  Heart Rate: 95  Resp: 22  SpO2: 93 %  Weight: 54.2 kg (119 lb 7.8 oz)    Admission Weight: Weight: 49.9 kg (110 lb)  Current Weight: Weight: 54.2 kg (119 lb 7.8 oz)    Input/Output in last 24 hours:    Intake/Output Summary (Last 24 hours) at 5/21/2025 1329  Last data filed at 5/21/2025 1321  Gross per 24 hour   Intake 5344.04 ml   Output 704 ml   Net 4640.04 ml      Net IO Since Admission: 4,640.04 mL [05/21/25 1329]     Physical Exam     GEN:  Middle-aged woman who appears disheveled, acute-on-chronically ill, and much older than stated age.  Sitting up in bed on NC.  NAD.  NEURO:  Brainstem reflexes intact.  No obvious focal deficit.  Moves all 4 ext.  HEENT:  N/AT.  PERRL.  MMM.  Oropharynx non-erythematous.  No drainage from the eyes/ears/nose.  No conjunctival petechiae.  No oral thrush.  Auditory and visual acuity grossly wnl.  Poor dentition.  Voice normal.  NECK:  Supple, NT, trachea midline.  No meningismus.  No ROM limitation.  No torticollis.  No JVD.  No thyromegaly.    CHEST/LUNGS:  Breath sounds are clear and equal bilaterally.  No w/r/r.  Chest excursion equal bilaterally.    CARDIOVASCULAR:  RRR w/o murmur noted.    GI:  Abdomen soft, NT, ND, +BS.   :  No Fields cath in place.  EXTREMITIES:  No deformity or amputation.  No cyanosis, edema, or asymmetry.  Pulses 2+ and equal in BLE's.    SKIN:  Warm, dry, and pink.  No rash.  Multiple scabs on skin.  LYMPHATICS/HEME:  No overt LAD or abnormal bruising.  No lymphedema.  MSK:  Normal ROM.  No joint abnormalities noted.  Strength is 5/5 and equal in BUE and BLE's.  PSYCH:   A&Ox 3.  Normal mood and flat affect.  Responds appropriately to commands and appears to comprehend instructions.            Radiology and Labs     Results from last 7 days   Lab Units 05/21/25  1203 05/21/25  0542 05/21/25  0520 05/20/25  1820   WBC 10*3/mm3  --  12.92* 13.31* 22.38*   HEMOGLOBIN g/dL 8.3* 5.7* 5.8* 9.1*   HEMATOCRIT % 26.5* 18.6* 19.2* 32.9*   PLATELETS 10*3/mm3  --  183 192 364      Results from last 7 days   Lab Units 05/20/25  1820   PROTIME Seconds 15.4*   INR  1.22*   APTT seconds 28.0      Results from last 7 days   Lab Units 05/21/25  1203 05/21/25  0841 05/21/25  0452 05/21/25  0053 05/20/25 2003 05/20/25  1820   SODIUM mmol/L 143 143 143 145 141 138   POTASSIUM mmol/L 3.7 3.4* 3.7 3.8 4.7 4.8   CHLORIDE mmol/L 108* 105 105 106 102 98   CO2 mmol/L 14.4* 10.5* 9.6* 6.0* 3.1* 3.4*   ANION GAP mmol/L 20.6* 27.5* 28.4* 33.0* 35.9* 36.6*   BUN mg/dL 17 17 17 17 17 18   CREATININE mg/dL 2.06* 2.05* 2.04* 1.97* 2.12* 2.27*   GLUCOSE mg/dL 160* 202* 230* 245* 377* 397*   PHOSPHORUS mg/dL 2.3* 2.5 3.2 4.5 6.2* 6.6*   MAGNESIUM mg/dL 1.7 1.7 1.9 1.9 2.2 2.3   ALT (SGPT) U/L  --   --  13  --   --  18   AST (SGOT) U/L  --   --  29  --   --  23   ALK PHOS U/L  --   --  235*  --   --  359*      Results from last 7 days   Lab Units 05/21/25  0452 05/20/25  1820   ALT (SGPT) U/L 13 18   AST (SGOT) U/L 29 23   ALK PHOS U/L 235* 359*     Results from last 7 days   Lab Units 05/20/25  2334 05/20/25  1908   PH, ARTERIAL pH units  --  6.988*   PCO2, ARTERIAL mm Hg  --  7.5*   PO2 ART mm Hg  --  120.6*   O2 SATURATION ART %  --  96.0   FIO2 % 21 21   HCO3 ART mmol/L  --  1.8*   BASE EXCESS ART mmol/L  --  -27.5*       XR Chest 1 View  Result Date: 5/21/2025  Impression: 1.New right internal jugular central venous catheter terminates in the upper right atrium. 2.Bibasilar atelectasis versus pneumonia. Electronically Signed: Clemente Dorman MD  5/21/2025 12:10 AM EDT  Workstation ID: [id number]    CT Abdomen Pelvis  Without Contrast  Result Date: 5/20/2025  Impression: 1. Coronary calcification. 2. Patchy airspace opacity in the lung bases possibly secondary to atelectasis or pneumonia. 3. Generalized anasarca. Small amount of abdominal and pelvic free fluid. 4. Punctate bilateral nonobstructing renal calcifications. 5. There appear to be areas of wall thickening throughout the large and small bowel possibly secondary to enterocolitis. Ischemia is felt to be less likely given the diffuse appearing nature. Electronically Signed: Markos Holland MD  5/20/2025 9:57 PM EDT  Workstation ID: [id number]    CT Head Without Contrast  Result Date: 5/20/2025  Impression: New 5 cm x 2 cm area of low-attenuation in the left PCA distribution suspicious for an area of infarction. No evidence of acute hemorrhage or midline shift. Electronically Signed: Markos Holland MD  5/20/2025 8:48 PM EDT  Workstation ID: [id number]    XR Chest 1 View  Result Date: 5/20/2025  Mild bibasal infiltrates. Electronically Signed: Markos Holland MD  5/20/2025 7:38 PM EDT  Workstation ID: [id number]      Current medications     Scheduled Meds:   aspirin, 325 mg, Oral, Daily   Or  aspirin, 300 mg, Rectal, Daily  atorvastatin, 80 mg, Oral, Nightly  [Held by provider] carbidopa-levodopa, 1 tablet, Oral, BID  [Held by provider] carvedilol, 12.5 mg, Oral, BID With Meals  cefTRIAXone, 1,000 mg, Intravenous, Q24H  heparin (porcine), 5,000 Units, Subcutaneous, Q8H  mupirocin, 1 Application, Each Nare, BID  pantoprazole, 40 mg, Intravenous, Q AM  senna-docusate sodium, 2 tablet, Oral, BID  sodium chloride, 10 mL, Intravenous, Q12H  sodium chloride, 10 mL, Intravenous, Q12H  sodium chloride, 10 mL, Intravenous, Q12H        Continuous Infusions:   dextrose 5 % and sodium chloride 0.45 %, 125 mL/hr  dextrose 5 % and sodium chloride 0.45 % with KCl 20 mEq/L, 125 mL/hr, Last Rate: 125 mL/hr (05/21/25 1312)  dextrose 5 % and sodium chloride 0.45 % with KCl 40 mEq/L, 125 mL/hr  dextrose 5  % and sodium chloride 0.9 %, 125 mL/hr  dextrose 5 % and sodium chloride 0.9 % with KCl 20 mEq, 125 mL/hr  dextrose 5% and sodium chloride 0.9% with KCl 40 mEq/L, 125 mL/hr  insulin, 0-100 Units/hr, Last Rate: 12.1 Units/hr (05/21/25 1317)  sodium chloride 0.45 % 1,000 mL with potassium chloride 40 mEq infusion, 200 mL/hr  sodium chloride, 200 mL/hr  sodium chloride 0.45 % with KCl 20 mEq, 200 mL/hr  sodium chloride, 200 mL/hr  sodium chloride 0.9 % with KCl 20 mEq, 200 mL/hr, Last Rate: Stopped (05/21/25 0143)  sodium chloride 0.9 % with KCl 40 mEq/L, 200 mL/hr          Plan discussed with RN. Reviewed all other data in the last 24 hours, including but not limited to vitals, labs, microbiology, imaging and pertinent notes from other providers.  Plan also discussed with pt at the bedside.      Ladarius Lester DO   Critical Care  05/21/25   13:29 EDT       Electronically signed by Ladarius Lester DO at 05/21/25 1352          Consult Notes (last 24 hours)        Hansa Osullivan MD at 05/21/25 1310        Consult Orders    1. Inpatient Neurology Consult Stroke [[id number]] ordered by Juliana Fletcher APRN at 05/20/25 2054                 Primary Care Provider: Provider, No Known     Consult requested by: ICU team    Reason for Consultation: Neurological evaluation /abnormal head CT    History taken from: chart RN    Chief complaint: DKA    Subjective   SUBJECTIVE:    History of present illness: Background per H&P: Sheryl Stuart is a 54 y.o. female who was evaluated in room ICU 2302 at [facility]    The patient's nurse was present    Source of information is mostly the medical records    Vital signs demonstrated some hypotension, as low as 77/48  Random glucose now 160, creatinine 2.06  Calcium 7.3  Last H&H was 8.3 and 26.5    The H&H dropped to as low as 5.7 and 18.6  When she came in her white count was 22.38, RBC count 3.23 and H&H 9.1 and 32.9 with platelet count of 364 she had moderate  acetone phosphorus 6.6 hemoglobin A1c 10.30 serum Osmo 333 abnormal ABGs    Her CT head was abnormal showing age-indeterminate infarct which looks more MCA branch to me,        Impression:   New 5 cm x 2 cm area of low-attenuation in the left PCA distribution suspicious for an area of infarction. No evidence of acute hemorrhage or midline shift.       Electronically Signed: Markos Holland MD    2025 8:48 PM EDT    Workstation ID: [id number]       Once I got the report, we had no last known well so my request was to get MRI brain and CTA head and neck or MRA head and neck depending upon which will be possible when we can but the patient has been too sick to go for any testing and her renal functions are bad    She is confused     Because of her confusion it is very questionable if she has any field cuts she has significant lower extremity weakness    But that is a possibility    It looks like she is on aspirin at home    We have no other imaging studies to compare    As per admitting,  Sheryl Stuart : 1970 MRN:[id number] LOS:1 ROOM: [facility]      Reason for admission: DKA (diabetic ketoacidosis)      Assessment / Plan      Diabetic ketoacidosis without coma, with history of diabetes mellitus, type 1  Metabolic acidosis  Etiology unclear  Anion gap of 36.6 on admission.  A1c 10.30  ABG 6.988/7 point 5/120/1.8  2 amps of sodium bicarbonate in ED  DKA protocol initiated  Insulin drip initiated per DKA protocol  Adjust iv fluids based on glucose, sodium, and potassium per protocol  Accu-Cheks every hour and serial labs as ordered  Continue protocol until resolved per protocol recommendations  Patient received a total of 3,497mL IVF boluses     Suspected acute ischemic stroke  Not a candidate for TNK d/t last know well time > 4.5 hours ago, LKW time was over 2-3 days ago  Ischemic Stroke orders initiated.  CT scan reviewed: New 5 cm x 2 cm area of low-attenuation in the left PCA distribution suspicious for an  area of infarction. No evidence of acute hemorrhage or midline shift   ECHO with bubble study ordered    Check LDL and A1c.  Start moderate/high intensity statins  Neurology consulted, will see patient in the AM, did not want additional scans tonight  Observe for signs and symptoms of bleeding, if observed, immediately notify Neurology  NPO per Code Stroke Protocol, may advance diet if patient passes nursing bedside swallow evaluation  PT/ST/OT evaluation & treatment     Shock, hypovolemia vs septic ( WBC>12 or <4 or >10% bands, Temp > 100.4 or < 96.8, HR>90, Creatinine >2, Change in mental status, and MAP<65 or SBP<90 )  Hypoalbuminemia  Unclear etiology, will rule out infectious source  Patient is in DKA and reportedly has not been eating or drinking for at least 2 days  WBC 22.38, no bandemia, lactate 1.4, procalcitonin 1.17  Will do blood / urine / sputum cultures  CXR no infiltrates or effusions  CT abdomen/pelvis: Patchy airspace opacity in the lung bases possibly secondary to atelectasis or pneumonia. Generalized anasarca. There appear to be areas of wall thickening throughout the large and small bowel possibly secondary to enterocolitis. Ischemia is felt to be less likely given the diffuse appearing nature   GI PCR ordered  Started on cefepime and vancomycin  S/p sepsis bolus, plus 2 additional liters IVF, totaling 3497mL, persistent hypotension in spite of adequate fluid resuscitation  25g/25% albumin ordered q6h x3 doses  C/w additional fluids as needed. Avoid fluid overload.   On levophed, titrate vasopressors for a target MAP of 65.      Acute Kidney Injury  Elevated CK  Remains nonoliguric. Baseline creatinine 0.9-1  Creatinine 2.27 on admit  CT: Punctate bilateral nonobstructing renal calcifications.   Likely prerenal. Possible intrinsic component due to ATN from hypotension and potential infection  C/w IV fluids as ordered  Monitor Input/Output very closely  Avoids NSAIDs, nephrotoxic medications, and  hypotension  Net IO Since Admission: 100 mL [05/21/25 0035]      Hypothermia  Hypothermic on admission, as low as 92.3F  Rebel hugger as need to achieve normothermia  TSH WNL 2.89     Hypocalcemia: 7.7, ionized calcium ordered, replace as needed     COPD: prn duonebs, on 2L nasal cannula, wean as tolerated, not hypoxemic  Hyperlipidemia: lipid panel in AM, patient does not appear to be on a statin per fill history  GERD: PPI  Chronic pain: continue prn oxycodone when appropriate  Hx NSTEMI, s/p PCI  Hx chronic diarrhea     Nutrition:   NPO Diet NPO Type: Strict NPO      VTE Prophylaxis:  Mechanical VTE prophylaxis orders are present.           History of Present illness      Sheryl Stuart is a 54 y.o. female with PMH of type 1 diabetes mellitus, non-STEMI, hypertension, GERD, chronic pain, COPD, and anxiety presented to the hospital for confusion.  Per report, family stated that the patient had been in bed and not eating or drinking x4 days. In the ED the patient was able to state her name only and follow some simple commands. Labs remarkable for ABG 6.988/7.5/120/1.8, , troponin 95, repeat troponin 88, anion gap 36.6, creatinine 2.27, glucose 397, calcium 8.2, phosphorus 6.6, alkaline phosphatase 359, albumin 2.2, hemoglobin A1c 10.3, osmolality 333, procalcitonin 1.17, PT 15.4, INR 1.22, WBC 22.38, hemoglobin 9.1.  UA showed trace amount of glucose, ketonuria, trace proteinuria.  UDS was positive for oxycodone which the patient is prescribed.  She received 2 A of bicarb, a full sepsis bolus, 1 additional liter IVF, cefepime, vancomycin, and was started on norepinephrine for persistent hypotension.  CT head showed a new 5 cm x 2 cm area of low-attenuation in the left PCA distribution suspicious for an area of infarction. No evidence of acute hemorrhage or midline shift. Her last known well time was days ago. Neurology consulted. CT abdomen/pelvis pending. CXR no infiltrates or effusions.   She was admitted  to the ICU with a principal diagnosis of DKA (diabetic ketoacidosis).         ACP: No ACP documentation on file. Code status clarified with NOK, daughter, while patient's ex- that she lives with agrees, DNR/DNI.     Patient was seen and examined on 05/20/25 at 00:35 EDT .                  - Portions of the above HPI were copied from previous encounters and edited as appropriate. PMH as detailed below.     Review of Systems   Very questionable considering her present condition    PATIENT HISTORY:  History reviewed. No pertinent past medical history., History reviewed. No pertinent surgical history., History reviewed. No pertinent family history.,  ,   Prior to Admission medications    Medication Sig Start Date End Date Taking? Authorizing Provider   aspirin 81 MG EC tablet Take 1 tablet by mouth Daily.    Minor Carbone MD   carbidopa-levodopa (SINEMET)  MG per tablet Take 1 tablet by mouth 2 (Two) Times a Day.    Minor Carbone MD   carvedilol (COREG) 12.5 MG tablet Take 1 tablet by mouth 2 (Two) Times a Day With Meals.    Minor Carbone MD   doxycycline (VIBRAMYICN) 100 MG tablet Take 1 tablet by mouth 2 (Two) Times a Day.    Minor Carbone MD   loperamide (IMODIUM) 2 MG capsule Take 1 capsule by mouth 4 (Four) Times a Day As Needed for Diarrhea.    Minor Carbone MD   omeprazole (priLOSEC) 40 MG capsule Take 1 capsule by mouth Daily.    Minor Carbone MD   ondansetron (ZOFRAN) 4 MG tablet Take 1 tablet by mouth Every 8 (Eight) Hours As Needed for Nausea or Vomiting.    Minor Carbone MD   oxyCODONE-acetaminophen (PERCOCET)  MG per tablet Take 1 tablet by mouth Every 6 (Six) Hours As Needed for Moderate Pain.    Minor Carbone MD   POTASSIUM GLUCONATE PO Take 650 mg by mouth Daily.    Minor Carbone MD   vitamin D3 125 MCG (5000 UT) capsule capsule Take 1 capsule by mouth Daily.    Minor Carbone MD    Allergies:   Penicillins    Current Facility-Administered Medications   Medication Dose Route Frequency Provider Last Rate Last Admin    acetaminophen (TYLENOL) tablet 650 mg  650 mg Oral Q4H PRN Early, ERICA Singh        Or    acetaminophen (TYLENOL) suppository 650 mg  650 mg Rectal Q4H PRN Early, ERICA Singh        aluminum-magnesium hydroxide-simethicone (MAALOX MAX) 400-400-40 MG/5ML suspension 15 mL  15 mL Oral Q6H PRN Early, ERICA Singh        aspirin tablet 325 mg  325 mg Oral Daily Early, ERICA Singh        Or    aspirin suppository 300 mg  300 mg Rectal Daily Early, ERICA Singh   300 mg at 05/21/25 0432    atorvastatin (LIPITOR) tablet 80 mg  80 mg Oral Nightly Early, ERICA Singh        sennosides-docusate (PERICOLACE) 8.6-50 MG per tablet 2 tablet  2 tablet Oral BID Early, ERICA Singh        And    polyethylene glycol (MIRALAX) packet 17 g  17 g Oral Daily PRN Early, ERICA Singh        And    bisacodyl (DULCOLAX) EC tablet 5 mg  5 mg Oral Daily PRN Early, ERICA Singh        And    bisacodyl (DULCOLAX) suppository 10 mg  10 mg Rectal Daily PRN Early, ERICA Singh        Calcium Replacement - Follow Nurse / BPA Driven Protocol   Not Applicable PRN Rudy Mcelroy MD        [Held by provider] carbidopa-levodopa (SINEMET)  MG per tablet 1 tablet  1 tablet Oral BID Bonifacio Benjamin APRN        [Held by provider] carvedilol (COREG) tablet 12.5 mg  12.5 mg Oral BID With Meals Bonifacio Benjamin APRN        cefTRIAXone (ROCEPHIN) 1,000 mg in sodium chloride 0.9 % 100 mL MBP  1,000 mg Intravenous Q24H Ladarius Lester DO        dextrose (D50W) (25 g/50 mL) IV injection 10-50 mL  10-50 mL Intravenous Q15 Min PRN Rudy Mcelroy MD        dextrose (GLUTOSE) oral gel 15 g  15 g Oral Q15 Min PRN Rudy Mcelroy MD        dextrose 5 % and sodium chloride 0.45 % infusion  125 mL/hr Intravenous Continuous PRN Rudy Mcelroy MD        dextrose 5 % and sodium chloride 0.45 % with KCl 20 mEq/L  infusion  125 mL/hr Intravenous Continuous PRN Rudy Mcelroy  mL/hr at 05/21/25 0927 125 mL/hr at 05/21/25 0927    dextrose 5 % and sodium chloride 0.45 % with KCl 40 mEq/L infusion  125 mL/hr Intravenous Continuous PRN Rudy Mcelroy MD        dextrose 5 % and sodium chloride 0.9 % infusion  125 mL/hr Intravenous Continuous PRN Rudy Mcelroy MD        dextrose 5 % and sodium chloride 0.9 % with KCl 20 mEq/L infusion  125 mL/hr Intravenous Continuous PRN Rudy Mcelroy MD        dextrose 5 % and sodium chloride 0.9 % with KCl 40 mEq/L infusion  125 mL/hr Intravenous Continuous PRN Rudy Mcelroy MD        Glucagon (GLUCAGEN) injection 1 mg  1 mg Intramuscular Q15 Min PRN Rudy Mcelroy MD        heparin (porcine) 5000 UNIT/ML injection 5,000 Units  5,000 Units Subcutaneous Q8H Ladarius Lester DO        insulin regular 1 unit/mL in 0.9% sodium chloride (Glucommander)  0-100 Units/hr Intravenous Titrated Rudy Mcelroy MD 16.6 mL/hr at 05/21/25 1206 16.6 Units/hr at 05/21/25 1206    ipratropium-albuterol (DUO-NEB) nebulizer solution 3 mL  3 mL Nebulization Q6H PRN Early, ERICA Singh        Magnesium Standard Dose Replacement - Follow Nurse / BPA Driven Protocol   Not Applicable PRRduy Franco MD        mupirocin (BACTROBAN) 2 % nasal ointment 1 Application  1 Application Each Nare BID Early, ERICA Singh   1 Application at 05/21/25 0818    nitroglycerin (NITROSTAT) SL tablet 0.4 mg  0.4 mg Sublingual Q5 Min PRN Early, ERICA Singh        pantoprazole (PROTONIX) injection 40 mg  40 mg Intravenous Q AM Early, ERICA Singh   40 mg at 05/21/25 0516    Phosphorus Replacement - Follow Nurse / BPA Driven Protocol   Not Applicable PRRudy Franco MD        Potassium Replacement - Follow Nurse / BPA Driven Protocol   Not Applicable PRRudy Franco MD        prochlorperazine (COMPAZINE) injection 5 mg  5 mg Intravenous Q6H PRN Early, Juliana MARTE, APRN         sodium chloride 0.45 % 1,000 mL with potassium chloride 40 mEq infusion  200 mL/hr Intravenous Continuous PRRudy Franco MD        sodium chloride 0.45 % infusion  200 mL/hr Intravenous Continuous Rudy Ndiaye MD        sodium chloride 0.45 % with KCl 20 mEq/L infusion  200 mL/hr Intravenous Continuous Rudy Ndiaye MD        sodium chloride 0.9 % flush 10 mL  10 mL Intravenous PRN Rudy Mcelroy MD        sodium chloride 0.9 % flush 10 mL  10 mL Intravenous Q12H Rudy Mcelroy MD   10 mL at 05/21/25 0818    sodium chloride 0.9 % flush 10 mL  10 mL Intravenous PRN Rudy Mcelroy MD        sodium chloride 0.9 % flush 10 mL  10 mL Intravenous Q12H Early, Juliana A, APRN   10 mL at 05/21/25 0818    sodium chloride 0.9 % flush 10 mL  10 mL Intravenous Q12H Early, Juliana A, APRN   10 mL at 05/21/25 0818    sodium chloride 0.9 % infusion 40 mL  40 mL Intravenous PRRudy Franco MD        sodium chloride 0.9 % infusion  200 mL/hr Intravenous Continuous PRRudy Franco MD        sodium chloride 0.9 % with KCl 20 mEq/L infusion  200 mL/hr Intravenous Continuous PRRudy Franco MD   Stopped at 05/21/25 0143    sodium chloride 0.9 % with KCl 40 mEq/L infusion  200 mL/hr Intravenous Continuous PRRudy Franco MD            ________________________________________________________     Objective   OBJECTIVE:  Upon today's exam, The patient is still encephalopathic and restless but otherwise in no acute distress right now      The patient is awake and alert and oriented x self in the hospital   not oriented to time    She can name and follow commands     Cranial nerve examination demonstrate:  Questionable if there is right inferior quadrantanopia in the right eye and questionable inferior nasal on the left side  Pupils are round, reactive to light and accommodation and size of about 3 mm  No ptosis or nystagmus  Funduscopic examination was not successful  Eye  movements are conjugate     Sensation on the face and scalp are normal  Muscles of mastication are normal and symmetric  Muscles of  facial expression are normal and symmetric  Hearing is intact bilaterally  Head turning and shoulder shrugs were unremarkable  Tongue was midline  I could not visualize  oropharynx or uvula     Motor examination:  Normal bulk, tone and strength was 5- in upper extremities and lower extremities she is really moving it minimally  No fasciculations     Sensory examination:  Intact for soft touch, pain   Romberg was not evaluated     Reflexes:  0/4     Coordination:  Not to finger-nose-finger or heel-to-shin     Gait:  Deferred     Toe signs:  Mute      NIH Stroke Scale  Interval: -- (arrival to ICU)  1a. Level of Consciousness: 1-->Not alert, but arousable by minor stimulation to obey, answer, or respond  1b. LOC Questions: 0-->Answers both questions correctly  1c. LOC Commands: 0-->Performs both tasks correctly  2. Best Gaze: 0-->Normal  3. Visual: 0-->No visual loss  4. Facial Palsy: 0-->Normal symmetrical movements  5a. Motor Arm, Left: 0-->No drift, limb holds 90 (or 45) degrees for full 10 secs  5b. Motor Arm, Right: 0-->No drift, limb holds 90 (or 45) degrees for full 10 secs  6a. Motor Leg, Left: 2-->Some effort against gravity, leg falls to bed by 5 secs, but has some effort against gravity  6b. Motor Leg, Right: 2-->Some effort against gravity, leg falls to bed by 5 secs, but has some effort against gravity  7. Limb Ataxia: 0-->Absent  8. Sensory: 0-->Normal, no sensory loss  9. Best Language: 1-->Mild-to-moderate aphasia, some obvious loss of fluency or facility of comprehension, without significant limitation on ideas expressed or form of expression. Reduction of speech and/or comprehension, however, makes conversation. . . (see row details)  10. Dysarthria: 1-->Mild-to-moderate dysarthria, patient slurs at least some words and, at worst, can be understood with some  difficulty  11. Extinction and Inattention (formerly Neglect): 0-->No abnormality  Total (NIH Stroke Scale): 7         ________________________________________________________   RESULTS REVIEW:    VITAL SIGNS:   Temp:  [92.3 °F (33.5 °C)-98.6 °F (37 °C)] 97.3 °F (36.3 °C)  Heart Rate:  [] 95  Resp:  [12-22] 22  BP: ()/(42-89) 142/84     LABS:      Lab 05/21/25  1203 05/21/25  0542 05/21/25  0520 05/20/25 1820 05/20/25  1812   WBC  --  12.92* 13.31* 22.38*  --    HEMOGLOBIN 8.3* 5.7* 5.8* 9.1*  --    HEMATOCRIT 26.5* 18.6* 19.2* 32.9*  --    PLATELETS  --  183 192 364  --    NEUTROS ABS  --  10.35* 10.60* 19.30*  --    IMMATURE GRANS (ABS)  --  0.18* 0.13* 0.59*  --    LYMPHS ABS  --  2.01 2.16 1.85  --    MONOS ABS  --  0.37 0.41 0.58  --    EOS ABS  --  0.00 0.00 0.03  --    MCV  --  93.5 94.1 101.9*  --    PROCALCITONIN  --   --   --  1.17*  --    LACTATE  --   --   --   --  1.4   PROTIME  --   --   --  15.4*  --    APTT  --   --   --  28.0  --          Lab 05/21/25  1203 05/21/25  0841 05/21/25  0452 05/21/25  0053 05/20/25 2003 05/20/25  1820   SODIUM 143 143 143 145 141 138   POTASSIUM 3.7 3.4* 3.7 3.8 4.7 4.8   CHLORIDE 108* 105 105 106 102 98   CO2 14.4* 10.5* 9.6* 6.0* 3.1* 3.4*   ANION GAP 20.6* 27.5* 28.4* 33.0* 35.9* 36.6*   BUN 17 17 17 17 17 18   CREATININE 2.06* 2.05* 2.04* 1.97* 2.12* 2.27*   EGFR 28.4* 28.5* 28.7* 29.9* 27.4* 25.2*   GLUCOSE 160* 202* 230* 245* 377* 397*   CALCIUM 7.3* 7.3* 7.4* 7.2* 7.7* 8.2*   IONIZED CALCIUM  --   --   --  1.12*  --   --    MAGNESIUM 1.7 1.7 1.9 1.9 2.2 2.3   PHOSPHORUS 2.3* 2.5 3.2 4.5 6.2* 6.6*   HEMOGLOBIN A1C  --   --   --   --   --  10.30*   TSH  --   --   --   --   --  2.890         Lab 05/21/25 0452 05/20/25  1820   TOTAL PROTEIN 4.6* 5.1*   ALBUMIN 3.0* 2.2*   GLOBULIN 1.6 2.9   ALT (SGPT) 13 18   AST (SGOT) 29 23   BILIRUBIN 0.3 0.2   ALK PHOS 235* 359*   LIPASE  --  21         Lab 05/20/25 2003 05/20/25  1820   HSTROP T 88* 95*   PROTIME   --  15.4*   INR  --  1.22*         Lab 05/21/25  0452   CHOLESTEROL 74   LDL CHOL 18   HDL CHOL 27*   TRIGLYCERIDES 180*         Lab 05/21/25  0841 05/21/25  0452 05/20/25  1840   IRON 14*  --   --    IRON SATURATION (TSAT) 23  --   --    TIBC 60*  --   --    TRANSFERRIN 40*  --   --    FERRITIN  --  558.00*  --    ABO TYPING  --   --  A   RH TYPING  --   --  Positive   ANTIBODY SCREEN  --   --  Negative         Lab 05/20/25  2334 05/20/25  1908   PH, ARTERIAL  --  6.988*   PCO2, ARTERIAL  --  7.5*   PO2 ART  --  120.6*   O2 SATURATION ART  --  96.0   FIO2 21 21   HCO3 ART  --  1.8*   BASE EXCESS ART  --  -27.5*     UA          5/20/2025    18:36   Urinalysis   Specific [place], UA 1.015    Ketones, UA 15 mg/dL (1+)    Blood, UA Negative    Leukocytes, UA Negative    Nitrite, UA Negative        Lab Results   Component Value Date    TSH 2.890 05/20/2025    LDL 18 05/21/2025    HGBA1C 10.30 (H) 05/20/2025       IMAGING STUDIES:  XR Chest 1 View  Result Date: 5/21/2025  Impression: 1.New right internal jugular central venous catheter terminates in the upper right atrium. 2.Bibasilar atelectasis versus pneumonia. Electronically Signed: Clemente Dorman MD  5/21/2025 12:10 AM EDT  Workstation ID: [id number]    CT Abdomen Pelvis Without Contrast  Result Date: 5/20/2025  Impression: 1. Coronary calcification. 2. Patchy airspace opacity in the lung bases possibly secondary to atelectasis or pneumonia. 3. Generalized anasarca. Small amount of abdominal and pelvic free fluid. 4. Punctate bilateral nonobstructing renal calcifications. 5. There appear to be areas of wall thickening throughout the large and small bowel possibly secondary to enterocolitis. Ischemia is felt to be less likely given the diffuse appearing nature. Electronically Signed: Markos Holland MD  5/20/2025 9:57 PM EDT  Workstation ID: [id number]    CT Head Without Contrast  Result Date: 5/20/2025  Impression: New 5 cm x 2 cm area of low-attenuation in the left PCA  distribution suspicious for an area of infarction. No evidence of acute hemorrhage or midline shift. Electronically Signed: Markos Holland MD  5/20/2025 8:48 PM EDT  Workstation ID: [id number]    XR Chest 1 View  Result Date: 5/20/2025  Mild bibasal infiltrates. Electronically Signed: Markos Holland MD  5/20/2025 7:38 PM EDT  Workstation ID: [id number]      I reviewed the patient's new clinical results.    ________________________________________________________     PROBLEM LIST:    DKA (diabetic ketoacidosis)            ASSESSMENT/PLAN:  Mental status changes which could be multifactorial toxic and metabolic encephalopathy  Abnormal head CT, it looks like a posterior branch of MCA but the patient is tilted so PCA abnormalities there, specifically if you look at the shape of it I will get MRI brain and MRA head and neck whenever possible      Her renal function was not good specially the creatinine level so I may not be able to get a CTA or with contrast studies her H&H is dropping and we do not know why        So aspirin alone may be the way to go up so far    Other test to be ordered she was not tenecteplase or intervention candidate as we do not know the last known well and also her deficits which are nonspecific          Modification of stroke risk factors:   - Blood pressure should be less than 130/80 outpatient, HbA1c less than 6.5, LDL less than 70; b12>500 and smoking cessation if applicable. We would be grateful if the primary team / primary care physician would keep a close watch on the above targets.  - Stroke education  - Follow up with neurologist of choice      I discussed the patient's findings and my recommendations with patient and nursing staff    Hansa Osullivan MD  05/21/25  13:10 EDT          Electronically signed by Hansa Osullivan MD at 05/21/25 1412          Discharge Summary        Bonifacio Benjamin APRN at 05/21/25 1526       Attestation signed by Ladarius Lester DO at 05/21/25 1534       I have reviewed this documentation and agree.                        CRITICAL CARE DISCHARGE SUMMARY           PATIENT NAME:  Sheryl Stuart             :  1970            MRN:  [id number]    DATE OF ADMISSION: 2025     DATE OF DISCHARGE: 2025    DISCHARGE DIAGNOSES:   Diabetic ketoacidosis without coma, with history of diabetes mellitus, type 1  Metabolic acidosis  Suspected acute ischemic stroke  Dysphagia  Shock, hypovolemia vs septic   Hypoalbuminemia  Acute Kidney Injury  Elevated CK  Hypothermia, resolved  Hypocalcemia  COPD  Hyperlipidemia  GERD  Chronic pain  Hx NSTEMI, s/p PCI  Hx chronic diarrhea      HOSPITAL COURSE  Patient is a 54 y.o. female with PMH of type 1 diabetes mellitus, non-STEMI, hypertension, GERD, chronic pain, COPD, and anxiety presented to the hospital for confusion.  Per report, family stated that the patient had been in bed and not eating or drinking x4 days. In the ED the patient was able to state her name only and follow some simple commands. Labs remarkable for ABG 6.988/7.5/120/1.8, , troponin 95, repeat troponin 88, anion gap 36.6, creatinine 2.27, glucose 397, calcium 8.2, phosphorus 6.6, alkaline phosphatase 359, albumin 2.2, hemoglobin A1c 10.3, osmolality 333, procalcitonin 1.17, PT 15.4, INR 1.22, WBC 22.38, hemoglobin 9.1.  UA showed trace amount of glucose, ketonuria, trace proteinuria.  UDS was positive for oxycodone which the patient is prescribed.  She received 2 A of bicarb, a full sepsis bolus, 1 additional liter IVF, cefepime, vancomycin, and was started on norepinephrine for persistent hypotension.  CT head showed a new 5 cm x 2 cm area of low-attenuation in the left PCA distribution suspicious for an area of infarction. No evidence of acute hemorrhage or midline shift. Her last known well time was days ago. Neurology consulted. CT abdomen/pelvis pending. CXR no infiltrates or effusions.     She was admitted to the ICU with a principal  diagnosis of DKA (diabetic ketoacidosis). On 5/21, patient afebrile, VSS.  A&Ox4.  Has only had 700 mL of UOP since admission, net +4.6 L.  Cr at baseline.  CO2 up to 14, AG remains 21.  Hb 5.7--transfused and came up to 8.  Pt has elected that she does not wish to continue any further treatment and has asked for hospice.  Pt's daughter agreed with this when RN spoke with her on the phone.  Will likely go home with hospice.  Continue current treatment measures for now; will not escalate care.  Patient did not pass her bedside swallow evaluation, and when given option to proceed with a video swallow, decided against proceeding with this test.  She stated that she would prefer to go comfort measures.  She was seen and evaluated by neurology.    Patient was accepted for transition to [facility] with [facility].  Patient will be readmitted to the hospitalist team.  Comfort orders in place.      PROCEDURES PERFORMED    05/20 2328 Insert Central Line At Bedside    LABS/RADIOLOGICAL STUDIES  Lab Results (last 72 hours)       Procedure Component Value Units Date/Time    POC Glucose Once [[id number]]  (Abnormal) Collected: 05/21/25 1457    Specimen: Blood Updated: 05/21/25 1458     Glucose 126 mg/dL      Comment: Serial Number: [id number]:  [id number]       Vitamin B12 [[id number]] Collected: 05/20/25 1818    Specimen: Blood Updated: 05/21/25 1415    POC Glucose Once [[id number]]  (Abnormal) Collected: 05/21/25 1310    Specimen: Blood Updated: 05/21/25 1332     Glucose 143 mg/dL      Comment: Serial Number: [id number]:  [id number]       POC Glucose Once [[id number]]  (Abnormal) Collected: 05/21/25 1202    Specimen: Blood Updated: 05/21/25 1332     Glucose 174 mg/dL      Comment: Serial Number: [id number]:  [id number]       Magnesium [[id number]]  (Normal) Collected: 05/21/25 1203    Specimen: Blood Updated: 05/21/25 1233     Magnesium 1.7 mg/dL     Basic Metabolic Panel [[id number]]  (Abnormal) Collected:  05/21/25 1203    Specimen: Blood Updated: 05/21/25 1233     Glucose 160 mg/dL      BUN 17 mg/dL      Creatinine 2.06 mg/dL      Sodium 143 mmol/L      Potassium 3.7 mmol/L      Chloride 108 mmol/L      CO2 14.4 mmol/L      Calcium 7.3 mg/dL      BUN/Creatinine Ratio 8.3     Anion Gap 20.6 mmol/L      eGFR 28.4 mL/min/1.73     Narrative:      GFR Categories in Chronic Kidney Disease (CKD)              GFR Category          GFR (mL/min/1.73)    Interpretation  G1                    90 or greater        Normal or high (1)  G2                    60-89                Mild decrease (1)  G3a                   45-59                Mild to moderate decrease  G3b                   30-44                Moderate to severe decrease  G4                    15-29                Severe decrease  G5                    14 or less           Kidney failure    (1)In the absence of evidence of kidney disease, neither GFR category G1 or G2 fulfill the criteria for CKD.    eGFR calculation 2021 CKD-EPI creatinine equation, which does not include race as a factor    Phosphorus [[id number]]  (Abnormal) Collected: 05/21/25 1203    Specimen: Blood Updated: 05/21/25 1233     Phosphorus 2.3 mg/dL     Hemoglobin & Hematocrit, Blood [[id number]]  (Abnormal) Collected: 05/21/25 1203    Specimen: Blood Updated: 05/21/25 1216     Hemoglobin 8.3 g/dL      Comment: Result checked          Hematocrit 26.5 %     Haptoglobin [[id number]]  (Normal) Collected: 05/20/25 2003    Specimen: Blood Updated: 05/21/25 1105     Haptoglobin 127 mg/dL      Comment: Specimen hemolyzed. Results may be affected.       POC Glucose Once [[id number]]  (Abnormal) Collected: 05/21/25 1043    Specimen: Blood Updated: 05/21/25 1045     Glucose 199 mg/dL      Comment: Serial Number: [id number]:  [id number]       CK [[id number]]  (Abnormal) Collected: 05/21/25 0841    Specimen: Blood Updated: 05/21/25 0957     Creatine Kinase 273 U/L     POC Glucose Once [[id number]]  (Abnormal)  Collected: 05/21/25 0956    Specimen: Blood Updated: 05/21/25 0957     Glucose 211 mg/dL      Comment: Serial Number: [id number]:  [id number]       Magnesium [[id number]]  (Normal) Collected: 05/21/25 0841    Specimen: Blood Updated: 05/21/25 0913     Magnesium 1.7 mg/dL     Basic Metabolic Panel [[id number]]  (Abnormal) Collected: 05/21/25 0841    Specimen: Blood Updated: 05/21/25 0913     Glucose 202 mg/dL      BUN 17 mg/dL      Creatinine 2.05 mg/dL      Sodium 143 mmol/L      Potassium 3.4 mmol/L      Comment: Specimen hemolyzed.  Result may be falsely elevated.        Chloride 105 mmol/L      CO2 10.5 mmol/L      Calcium 7.3 mg/dL      BUN/Creatinine Ratio 8.3     Anion Gap 27.5 mmol/L      eGFR 28.5 mL/min/1.73     Narrative:      GFR Categories in Chronic Kidney Disease (CKD)              GFR Category          GFR (mL/min/1.73)    Interpretation  G1                    90 or greater        Normal or high (1)  G2                    60-89                Mild decrease (1)  G3a                   45-59                Mild to moderate decrease  G3b                   30-44                Moderate to severe decrease  G4                    15-29                Severe decrease  G5                    14 or less           Kidney failure    (1)In the absence of evidence of kidney disease, neither GFR category G1 or G2 fulfill the criteria for CKD.    eGFR calculation 2021 CKD-EPI creatinine equation, which does not include race as a factor    Ferritin [[id number]]  (Abnormal) Collected: 05/21/25 0452    Specimen: Blood Updated: 05/21/25 0909     Ferritin 558.00 ng/mL     Narrative:      Results may be falsely decreased if patient taking Biotin.      Phosphorus [[id number]]  (Normal) Collected: 05/21/25 0841    Specimen: Blood Updated: 05/21/25 0908     Phosphorus 2.5 mg/dL     Vancomycin, Random [[id number]]  (Normal) Collected: 05/21/25 0841    Specimen: Blood Updated: 05/21/25 0908     Vancomycin Random 10.40 mcg/mL      Narrative:      Therapeutic Ranges for Vancomycin    Vancomycin Random   5.0-40.0 mcg/mL  Vancomycin Trough   5.0-20.0 mcg/mL  Vancomycin Peak     20.0-40.0 mcg/mL    Iron Profile [[id number]]  (Abnormal) Collected: 05/21/25 0841    Specimen: Blood Updated: 05/21/25 0908     Iron 14 mcg/dL      Iron Saturation (TSAT) 23 %      Transferrin 40 mg/dL      TIBC 60 mcg/dL     Reticulocytes [[id number]]  (Normal) Collected: 05/21/25 0542    Specimen: Blood Updated: 05/21/25 0859     Reticulocyte % 1.25 %      Reticulocyte Absolute 0.0243 10*6/mm3     POC Glucose Once [[id number]]  (Abnormal) Collected: 05/21/25 0839    Specimen: Blood Updated: 05/21/25 0841     Glucose 203 mg/dL      Comment: Serial Number: [id number]:  [id number]       POC Glucose Once [[id number]]  (Abnormal) Collected: 05/21/25 0727    Specimen: Blood Updated: 05/21/25 0729     Glucose 222 mg/dL      Comment: Serial Number: [id number]:  [id number]       POC Glucose Once [[id number]]  (Abnormal) Collected: 05/21/25 0609    Specimen: Blood Updated: 05/21/25 0611     Glucose 227 mg/dL      Comment: Serial Number: [id number]:  [id number]       CBC & Differential [[id number]]  (Abnormal) Collected: 05/21/25 0542    Specimen: Blood Updated: 05/21/25 0555    Narrative:      The following orders were created for panel order CBC & Differential.  Procedure                               Abnormality         Status                     ---------                               -----------         ------                     CBC Auto Differential[[id number]]        Abnormal            Final result                 Please view results for these tests on the individual orders.    CBC Auto Differential [[id number]]  (Abnormal) Collected: 05/21/25 0542    Specimen: Blood Updated: 05/21/25 0555     WBC 12.92 10*3/mm3      RBC 1.99 10*6/mm3      Hemoglobin 5.7 g/dL      Hematocrit 18.6 %      MCV 93.5 fL      MCH 28.6 pg      MCHC 30.6 g/dL      RDW 15.2 %       RDW-SD 51.4 fl      MPV 11.6 fL      Platelets 183 10*3/mm3      Neutrophil % 80.0 %      Lymphocyte % 15.6 %      Monocyte % 2.9 %      Eosinophil % 0.0 %      Basophil % 0.1 %      Immature Grans % 1.4 %      Neutrophils, Absolute 10.35 10*3/mm3      Lymphocytes, Absolute 2.01 10*3/mm3      Monocytes, Absolute 0.37 10*3/mm3      Eosinophils, Absolute 0.00 10*3/mm3      Basophils, Absolute 0.01 10*3/mm3      Immature Grans, Absolute 0.18 10*3/mm3      nRBC 0.3 /100 WBC     Phosphorus [[id number]]  (Normal) Collected: 05/21/25 0452    Specimen: Blood Updated: 05/21/25 0532     Phosphorus 3.2 mg/dL     CBC & Differential [[id number]]  (Abnormal) Collected: 05/21/25 0520    Specimen: Blood Updated: 05/21/25 0531    Narrative:      The following orders were created for panel order CBC & Differential.  Procedure                               Abnormality         Status                     ---------                               -----------         ------                     CBC Auto Differential[[id number]]        Abnormal            Final result                 Please view results for these tests on the individual orders.    CBC Auto Differential [[id number]]  (Abnormal) Collected: 05/21/25 0520    Specimen: Blood Updated: 05/21/25 0531     WBC 13.31 10*3/mm3      RBC 2.04 10*6/mm3      Hemoglobin 5.8 g/dL      Comment: Result checked          Hematocrit 19.2 %      Comment: Result checked          MCV 94.1 fL      MCH 28.4 pg      MCHC 30.2 g/dL      RDW 15.4 %      RDW-SD 53.0 fl      MPV 11.5 fL      Platelets 192 10*3/mm3      Neutrophil % 79.6 %      Lymphocyte % 16.2 %      Monocyte % 3.1 %      Eosinophil % 0.0 %      Basophil % 0.1 %      Immature Grans % 1.0 %      Neutrophils, Absolute 10.60 10*3/mm3      Lymphocytes, Absolute 2.16 10*3/mm3      Monocytes, Absolute 0.41 10*3/mm3      Eosinophils, Absolute 0.00 10*3/mm3      Basophils, Absolute 0.01 10*3/mm3      Immature Grans, Absolute 0.13 10*3/mm3      nRBC 0.4  /100 WBC     Magnesium [[id number]]  (Normal) Collected: 05/21/25 0452    Specimen: Blood Updated: 05/21/25 0531     Magnesium 1.9 mg/dL     Comprehensive Metabolic Panel [[id number]]  (Abnormal) Collected: 05/21/25 0452    Specimen: Blood Updated: 05/21/25 0531     Glucose 230 mg/dL      BUN 17 mg/dL      Creatinine 2.04 mg/dL      Sodium 143 mmol/L      Potassium 3.7 mmol/L      Chloride 105 mmol/L      CO2 9.6 mmol/L      Calcium 7.4 mg/dL      Total Protein 4.6 g/dL      Albumin 3.0 g/dL      ALT (SGPT) 13 U/L      AST (SGOT) 29 U/L      Alkaline Phosphatase 235 U/L      Total Bilirubin 0.3 mg/dL      Globulin 1.6 gm/dL      A/G Ratio 1.9 g/dL      BUN/Creatinine Ratio 8.3     Anion Gap 28.4 mmol/L      eGFR 28.7 mL/min/1.73     Narrative:      GFR Categories in Chronic Kidney Disease (CKD)              GFR Category          GFR (mL/min/1.73)    Interpretation  G1                    90 or greater        Normal or high (1)  G2                    60-89                Mild decrease (1)  G3a                   45-59                Mild to moderate decrease  G3b                   30-44                Moderate to severe decrease  G4                    15-29                Severe decrease  G5                    14 or less           Kidney failure    (1)In the absence of evidence of kidney disease, neither GFR category G1 or G2 fulfill the criteria for CKD.    eGFR calculation 2021 CKD-EPI creatinine equation, which does not include race as a factor    Lipid Panel [[id number]]  (Abnormal) Collected: 05/21/25 0452    Specimen: Blood Updated: 05/21/25 0524     Total Cholesterol 74 mg/dL      Triglycerides 180 mg/dL      HDL Cholesterol 27 mg/dL      LDL Cholesterol  18 mg/dL      VLDL Cholesterol 29 mg/dL      LDL/HDL Ratio 0.41    Narrative:      Cholesterol Reference Ranges  (U.S. Department of Health and Human Services ATP III Classifications)    Desirable          <200 mg/dL  Borderline High    200-239 mg/dL  High Risk           >240 mg/dL      Triglyceride Reference Ranges  (U.S. Department of Health and Human Services ATP III Classifications)    Normal           <150 mg/dL  Borderline High  150-199 mg/dL  High             200-499 mg/dL  Very High        >500 mg/dL    HDL Reference Ranges  (U.S. Department of Health and Human Services ATP III Classifications)    Low     <40 mg/dl (major risk factor for CHD)  High    >60 mg/dl ('negative' risk factor for CHD)        LDL Reference Ranges  (U.S. Department of Health and Human Services ATP III Classifications)    Optimal          <100 mg/dL  Near Optimal     100-129 mg/dL  Borderline High  130-159 mg/dL  High             160-189 mg/dL  Very High        >189 mg/dL    LDL is calculated using the NIH LDL-C calculation.      POC Glucose Once [[id number]]  (Abnormal) Collected: 05/21/25 0450    Specimen: Blood Updated: 05/21/25 0452     Glucose 248 mg/dL      Comment: Serial Number: [id number]:  [id number]       POC Glucose Once [[id number]]  (Abnormal) Collected: 05/21/25 0340    Specimen: Blood Updated: 05/21/25 0342     Glucose 251 mg/dL      Comment: Serial Number: [id number]:  [id number]       POC Glucose Once [[id number]]  (Abnormal) Collected: 05/21/25 0237    Specimen: Blood Updated: 05/21/25 0239     Glucose 255 mg/dL      Comment: Serial Number: [id number]:  [id number]       Calcium, Ionized [[id number]]  (Abnormal) Collected: 05/21/25 0053    Specimen: Blood Updated: 05/21/25 0221     Ionized Calcium 1.12 mmol/L     POC Glucose Once [[id number]]  (Abnormal) Collected: 05/21/25 0134    Specimen: Blood Updated: 05/21/25 0136     Glucose 228 mg/dL      Comment: Serial Number: [id number]:  [id number]       Phosphorus [[id number]]  (Normal) Collected: 05/21/25 0053    Specimen: Blood Updated: 05/21/25 0129     Phosphorus 4.5 mg/dL     Basic Metabolic Panel [[id number]]  (Abnormal) Collected: 05/21/25 0053    Specimen: Blood Updated: 05/21/25 0128     Glucose 245 mg/dL       BUN 17 mg/dL      Creatinine 1.97 mg/dL      Sodium 145 mmol/L      Potassium 3.8 mmol/L      Chloride 106 mmol/L      CO2 6.0 mmol/L      Calcium 7.2 mg/dL      BUN/Creatinine Ratio 8.6     Anion Gap 33.0 mmol/L      eGFR 29.9 mL/min/1.73     Narrative:      GFR Categories in Chronic Kidney Disease (CKD)              GFR Category          GFR (mL/min/1.73)    Interpretation  G1                    90 or greater        Normal or high (1)  G2                    60-89                Mild decrease (1)  G3a                   45-59                Mild to moderate decrease  G3b                   30-44                Moderate to severe decrease  G4                    15-29                Severe decrease  G5                    14 or less           Kidney failure    (1)In the absence of evidence of kidney disease, neither GFR category G1 or G2 fulfill the criteria for CKD.    eGFR calculation 2021 CKD-EPI creatinine equation, which does not include race as a factor    Magnesium [[id number]]  (Normal) Collected: 05/21/25 0053    Specimen: Blood Updated: 05/21/25 0128     Magnesium 1.9 mg/dL     MRSA Screen, PCR (Inpatient) - Swab, Nares [[id number]]  (Normal) Collected: 05/20/25 2357    Specimen: Swab from Nares Updated: 05/21/25 0113     MRSA PCR No MRSA Detected    Narrative:      The negative predictive value of this diagnostic test is high and should only be used to consider de-escalating anti-MRSA therapy. A positive result may indicate colonization with MRSA and must be correlated clinically.    Respiratory Panel PCR w/COVID-19(SARS-CoV-2) [facility] In-House, NP Swab in UTM/VTM, 2 HR TAT - Swab, Nasopharynx [[id number]]  (Normal) Collected: 05/20/25 2357    Specimen: Swab from Nasopharynx Updated: 05/21/25 0048     ADENOVIRUS, PCR Not Detected     Coronavirus 229E Not Detected     Coronavirus HKU1 Not Detected     Coronavirus NL63 Not Detected     Coronavirus OC43 Not Detected     COVID19 Not Detected      Human Metapneumovirus Not Detected     Human Rhinovirus/Enterovirus Not Detected     Influenza A PCR Not Detected     Influenza B PCR Not Detected     Parainfluenza Virus 1 Not Detected     Parainfluenza Virus 2 Not Detected     Parainfluenza Virus 3 Not Detected     Parainfluenza Virus 4 Not Detected     RSV, PCR Not Detected     Bordetella pertussis pcr Not Detected     Bordetella parapertussis PCR Not Detected     Chlamydophila pneumoniae PCR Not Detected     Mycoplasma pneumo by PCR Not Detected    Narrative:      In the setting of a positive respiratory panel with a viral infection PLUS a negative procalcitonin without other underlying concern for bacterial infection, consider observing off antibiotics or discontinuation of antibiotics and continue supportive care. If the respiratory panel is positive for atypical bacterial infection (Bordetella pertussis, Chlamydophila pneumoniae, or Mycoplasma pneumoniae), consider antibiotic de-escalation to target atypical bacterial infection.    POC Glucose Once [[id number]]  (Abnormal) Collected: 05/21/25 0024    Specimen: Blood Updated: 05/21/25 0025     Glucose 252 mg/dL      Comment: Serial Number: [id number]:  [id number]       Blood Gas, Venous - [[id number]]  (Abnormal) Collected: 05/20/25 2334    Specimen: Venous Blood from Cannula Updated: 05/20/25 2338     Site PICC     pH, Venous 7.107 pH Units      pCO2, Venous 14.5 mm Hg      pO2, Venous 44.1 mm Hg      HCO3, Venous 4.6 mmol/L      Base Excess, Venous -22.9 mmol/L      Comment: Serial Number: [id number]:  [id number]        O2 Saturation, Venous 65.2 %      CO2 Content 5.0 mmol/L      Barometric Pressure for Blood Gas --     Comment: N/A        Modality Room Air     FIO2 21 %     POC Glucose Once [[id number]]  (Abnormal) Collected: 05/20/25 2308    Specimen: Blood Updated: 05/20/25 2310     Glucose 301 mg/dL      Comment: Serial Number: [id number]:  [id number]       POC Glucose Once [[id number]]   (Abnormal) Collected: 05/20/25 2205    Specimen: Blood Updated: 05/20/25 2207     Glucose 335 mg/dL      Comment: Serial Number: [id number]:  [id number]       Beta Hydroxybutyrate Quantitative [[id number]] Collected: 05/20/25 2003    Specimen: Blood Updated: 05/20/25 2115    Hemoglobin A1c [[id number]]  (Abnormal) Collected: 05/20/25 1820    Specimen: Blood Updated: 05/20/25 2110     Hemoglobin A1C 10.30 %     Narrative:      Hemoglobin A1C Ranges:    Increased Risk for Diabetes  5.7% to 6.4%  Diabetes                     >= 6.5%  Diabetic Goal                < 7.0%    POC Glucose Once [[id number]]  (Abnormal) Collected: 05/20/25 2100    Specimen: Blood Updated: 05/20/25 2102     Glucose 346 mg/dL      Comment: Serial Number: [id number]:  [id number]       Basic Metabolic Panel [[id number]]  (Abnormal) Collected: 05/20/25 2003    Specimen: Blood Updated: 05/20/25 2034     Glucose 377 mg/dL      BUN 17 mg/dL      Creatinine 2.12 mg/dL      Sodium 141 mmol/L      Potassium 4.7 mmol/L      Chloride 102 mmol/L      CO2 3.1 mmol/L      Calcium 7.7 mg/dL      BUN/Creatinine Ratio 8.0     Anion Gap 35.9 mmol/L      eGFR 27.4 mL/min/1.73     Narrative:      GFR Categories in Chronic Kidney Disease (CKD)              GFR Category          GFR (mL/min/1.73)    Interpretation  G1                    90 or greater        Normal or high (1)  G2                    60-89                Mild decrease (1)  G3a                   45-59                Mild to moderate decrease  G3b                   30-44                Moderate to severe decrease  G4                    15-29                Severe decrease  G5                    14 or less           Kidney failure    (1)In the absence of evidence of kidney disease, neither GFR category G1 or G2 fulfill the criteria for CKD.    eGFR calculation 2021 CKD-EPI creatinine equation, which does not include race as a factor    Magnesium [[id number]]  (Normal) Collected: 05/20/25 2003     Specimen: Blood Updated: 05/20/25 2034     Magnesium 2.2 mg/dL     High Sensitivity Troponin T 1Hr [[id number]]  (Abnormal) Collected: 05/20/25 2003    Specimen: Blood Updated: 05/20/25 2034     HS Troponin T 88 ng/L      Troponin T Numeric Delta -7 ng/L      Troponin T % Delta -7    Narrative:      High Sensitive Troponin T Reference Range:  <14.0 ng/L- Negative Female for AMI  <22.0 ng/L- Negative Male for AMI  >=14 - Abnormal Female indicating possible myocardial injury.  >=22 - Abnormal Male indicating possible myocardial injury.   Clinicians would have to utilize clinical acumen, EKG, Troponin, and serial changes to determine if it is an Acute Myocardial Infarction or myocardial injury due to an underlying chronic condition.         Phosphorus [[id number]]  (Abnormal) Collected: 05/20/25 2003    Specimen: Blood Updated: 05/20/25 2029     Phosphorus 6.2 mg/dL     POC Glucose Once [[id number]]  (Abnormal) Collected: 05/20/25 2011    Specimen: Blood Updated: 05/20/25 2012     Glucose 383 mg/dL      Comment: Serial Number: [id number]:  [id number]       Phosphorus [[id number]]  (Abnormal) Collected: 05/20/25 1820    Specimen: Blood Updated: 05/20/25 1941     Phosphorus 6.6 mg/dL     Ketone Bodies, Serum (Not performed at [facility]) [[id number]]  (Abnormal) Collected: 05/20/25 1820    Specimen: Blood Updated: 05/20/25 1933    Narrative:      The following orders were created for panel order Ketone Bodies, Serum (Not performed at [facility]).  Procedure                               Abnormality         Status                     ---------                               -----------         ------                     Acetone[[id number]]                      Abnormal            Final result                 Please view results for these tests on the individual orders.    Acetone [[id number]]  (Abnormal) Collected: 05/20/25 1820    Specimen: Blood Updated: 05/20/25 1933     Acetone Moderate    Osmolality, Serum [[id number]]   (Abnormal) Collected: 05/20/25 1818    Specimen: Blood Updated: 05/20/25 1932     Osmolality 333 mOsm/kg     Blood Gas, Arterial - [[id number]]  (Abnormal) Collected: 05/20/25 1908    Specimen: Arterial Blood Updated: 05/20/25 1913     Site Right Radial     Curtis's Test Positive     pH, Arterial 6.988 pH units      pCO2, Arterial 7.5 mm Hg      pO2, Arterial 120.6 mm Hg      HCO3, Arterial 1.8 mmol/L      Base Excess, Arterial -27.5 mmol/L      Comment: Serial Number: [id number]:  [id number]        O2 Saturation, Arterial 96.0 %      CO2 Content <5.0 mmol/L      Barometric Pressure for Blood Gas --     Comment: N/A        Modality Room Air     FIO2 21 %      Notified Who --     Read Back --     Notified Time --     Hemodilution No     PO2/FIO2 574    High Sensitivity Troponin T [[id number]]  (Abnormal) Collected: 05/20/25 1820    Specimen: Blood Updated: 05/20/25 1911     HS Troponin T 95 ng/L     Narrative:      High Sensitive Troponin T Reference Range:  <14.0 ng/L- Negative Female for AMI  <22.0 ng/L- Negative Male for AMI  >=14 - Abnormal Female indicating possible myocardial injury.  >=22 - Abnormal Male indicating possible myocardial injury.   Clinicians would have to utilize clinical acumen, EKG, Troponin, and serial changes to determine if it is an Acute Myocardial Infarction or myocardial injury due to an underlying chronic condition.         Comprehensive Metabolic Panel [[id number]]  (Abnormal) Collected: 05/20/25 1820    Specimen: Blood Updated: 05/20/25 1911     Glucose 397 mg/dL      BUN 18 mg/dL      Creatinine 2.27 mg/dL      Sodium 138 mmol/L      Potassium 4.8 mmol/L      Chloride 98 mmol/L      CO2 3.4 mmol/L      Calcium 8.2 mg/dL      Total Protein 5.1 g/dL      Albumin 2.2 g/dL      ALT (SGPT) 18 U/L      AST (SGOT) 23 U/L      Alkaline Phosphatase 359 U/L      Total Bilirubin 0.2 mg/dL      Globulin 2.9 gm/dL      A/G Ratio 0.8 g/dL      BUN/Creatinine Ratio 7.9     Anion Gap 36.6 mmol/L      eGFR  "25.2 mL/min/1.73     Narrative:      GFR Categories in Chronic Kidney Disease (CKD)              GFR Category          GFR (mL/min/1.73)    Interpretation  G1                    90 or greater        Normal or high (1)  G2                    60-89                Mild decrease (1)  G3a                   45-59                Mild to moderate decrease  G3b                   30-44                Moderate to severe decrease  G4                    15-29                Severe decrease  G5                    14 or less           Kidney failure    (1)In the absence of evidence of kidney disease, neither GFR category G1 or G2 fulfill the criteria for CKD.    eGFR calculation 2021 CKD-EPI creatinine equation, which does not include race as a factor    Magnesium [[id number]]  (Normal) Collected: 05/20/25 1820    Specimen: Blood Updated: 05/20/25 1911     Magnesium 2.3 mg/dL     Lipase [[id number]]  (Normal) Collected: 05/20/25 1820    Specimen: Blood Updated: 05/20/25 1904     Lipase 21 U/L     Procalcitonin [[id number]]  (Abnormal) Collected: 05/20/25 1820    Specimen: Blood Updated: 05/20/25 1904     Procalcitonin 1.17 ng/mL     Narrative:      As a Marker for Sepsis (Non-Neonates):    1. <0.5 ng/mL represents a low risk of severe sepsis and/or septic shock.  2. >2 ng/mL represents a high risk of severe sepsis and/or septic shock.    As a Marker for Lower Respiratory Tract Infections that require antibiotic therapy:    PCT on Admission    Antibiotic Therapy       6-12 Hrs later    >0.5                Strongly Recommended  >0.25 - <0.5        Recommended   0.1 - 0.25          Discouraged              Remeasure/reassess PCT  <0.1                Strongly Discouraged     Remeasure/reassess PCT    As 28 day mortality risk marker: \"Change in Procalcitonin Result\" (>80% or <=80%) if Day 0 (or Day 1) and Day 4 values are available. Refer to http://www.[employer]-pct-calculator.com    Change in PCT <=80%  A decrease of PCT levels below or " equal to 80% defines a positive change in PCT test result representing a higher risk for 28-day all-cause mortality of patients diagnosed with severe sepsis for septic shock.    Change in PCT >80%  A decrease of PCT levels of more than 80% defines a negative change in PCT result representing a lower risk for 28-day all-cause mortality of patients diagnosed with severe sepsis or septic shock.       Ethanol [[id number]] Collected: 05/20/25 1820    Specimen: Blood Updated: 05/20/25 1904     Ethanol % <0.010 %     Narrative:      Plasma Ethanol Clinical Symptoms:    ETOH (%)               Clinical Symptom  .01-.05              No apparent influence  .03-.12              Euphoria, Diminished judgment and attention   .09-.25              Impaired comprehension, Muscle incoordination  .18-.30              Confusion, Staggered gait, Slurred speech  .25-.40              Markedly decreased response to stimuli, unable to stand or                        walk, vomitting, sleep or stupor  .35-.50              Comatose, Anesthesia, Subnormal body temperature        TSH Rfx On Abnormal To Free T4 [[id number]]  (Normal) Collected: 05/20/25 1820    Specimen: Blood Updated: 05/20/25 1904     TSH 2.890 uIU/mL     CK [[id number]]  (Abnormal) Collected: 05/20/25 1820    Specimen: Blood Updated: 05/20/25 1904     Creatine Kinase 269 U/L     [place] Draw [[id number]] Collected: 05/20/25 1818    Specimen: Blood Updated: 05/20/25 1901    Narrative:      The following orders were created for panel order [place] Draw.  Procedure                               Abnormality         Status                     ---------                               -----------         ------                     Green Top (Gel)[[id number]]                                                             Lavender Top[[id number]]                                                                Gold Top - SST[[id number]]                                   Final result                Light Blue Top[[id number]]                                                                Please view results for these tests on the individual orders.    Urine Drug Screen - Urine, Clean Catch [[id number]]  (Abnormal) Collected: 05/20/25 1839    Specimen: Urine, Clean Catch Updated: 05/20/25 1900     THC, Screen, Urine Negative     Phencyclidine (PCP), Urine Negative     Cocaine Screen, Urine Negative     Methamphetamine, Ur Negative     Opiate Screen Negative     Amphetamine Screen, Urine Negative     Benzodiazepine Screen, Urine Negative     Tricyclic Antidepressants Screen Negative     Methadone Screen, Urine Negative     Barbiturates Screen, Urine Negative     Oxycodone Screen, Urine Positive     Buprenorphine, Screen, Urine Negative    Narrative:      Cutoff For Drugs Screened:    Amphetamines               500 ng/ml  Barbiturates               200 ng/ml  Benzodiazepines            150 ng/ml  Cocaine                    150 ng/ml  Methadone                  200 ng/ml  Opiates                    100 ng/ml  Phencyclidine               25 ng/ml  THC                         50 ng/ml  Methamphetamine            500 ng/ml  Tricyclic Antidepressants  300 ng/ml  Oxycodone                  100 ng/ml  Buprenorphine               10 ng/ml    The normal value for all drugs tested is negative. This report includes unconfirmed screening results, with the cutoff values listed, to be used for medical treatment purposes only.  Unconfirmed results must not be used for non-medical purposes such as employment or legal testing.  Clinical consideration should be applied to any drug of abuse test, particularly when unconfirmed results are used.    All urine drugs of abuse requests without chain of custody are for medical screening purposes only.  False positives are possible.      Ammonia [[id number]]  (Normal) Collected: 05/20/25 1820    Specimen: Blood Updated: 05/20/25 1849     Ammonia 44 umol/L     Urinalysis With Culture If  Indicated - Straight Cath [[id number]]  (Abnormal) Collected: 05/20/25 1836    Specimen: Urine from Straight Cath Updated: 05/20/25 1847     Color, UA Yellow     Appearance, UA Clear     pH, UA 5.5     Specific Gravity, UA 1.015     Glucose,  mg/dL (Trace)     Ketones, UA 15 mg/dL (1+)     Bilirubin, UA Negative     Blood, UA Negative     Protein, UA Trace     Leuk Esterase, UA Negative     Nitrite, UA Negative     Urobilinogen, UA 0.2 E.U./dL    Narrative:      In absence of clinical symptoms, the presence of pyuria, bacteria, and/or nitrites on the urinalysis result does not correlate with infection.  Urine microscopic not indicated.    Carbon Monoxide, Blood [[id number]]  (Normal) Collected: 05/20/25 1840    Specimen: Blood Updated: 05/20/25 1847     Carbon Monoxide, Blood 2.6 %     aPTT [[id number]]  (Normal) Collected: 05/20/25 1820    Specimen: Blood Updated: 05/20/25 1841     PTT 28.0 seconds     Protime-INR [[id number]]  (Abnormal) Collected: 05/20/25 1820    Specimen: Blood Updated: 05/20/25 1841     Protime 15.4 Seconds      INR 1.22    CBC & Differential [[id number]]  (Abnormal) Collected: 05/20/25 1820    Specimen: Blood Updated: 05/20/25 1833    Narrative:      The following orders were created for panel order CBC & Differential.  Procedure                               Abnormality         Status                     ---------                               -----------         ------                     CBC Auto Differential[[id number]]        Abnormal            Final result                 Please view results for these tests on the individual orders.    CBC Auto Differential [[id number]]  (Abnormal) Collected: 05/20/25 1820    Specimen: Blood Updated: 05/20/25 1833     WBC 22.38 10*3/mm3      RBC 3.23 10*6/mm3      Hemoglobin 9.1 g/dL      Hematocrit 32.9 %      .9 fL      MCH 28.2 pg      MCHC 27.7 g/dL      RDW 15.7 %      RDW-SD 58.8 fl      MPV 12.0 fL      Platelets 364 10*3/mm3       Neutrophil % 86.3 %      Lymphocyte % 8.3 %      Monocyte % 2.6 %      Eosinophil % 0.1 %      Basophil % 0.1 %      Immature Grans % 2.6 %      Neutrophils, Absolute 19.30 10*3/mm3      Lymphocytes, Absolute 1.85 10*3/mm3      Monocytes, Absolute 0.58 10*3/mm3      Eosinophils, Absolute 0.03 10*3/mm3      Basophils, Absolute 0.03 10*3/mm3      Immature Grans, Absolute 0.59 10*3/mm3      nRBC 0.6 /100 WBC     Gold Top - SST [[id number]] Collected: 05/20/25 1818    Specimen: Blood Updated: 05/20/25 1831     Extra Tube Hold for add-ons.     Comment: Auto resulted.       Blood Culture - Blood, Wrist, Right [[id number]] Collected: 05/20/25 1826    Specimen: Blood from Wrist, Right Updated: 05/20/25 1828    Blood Culture - Blood, Arm, Left [[id number]] Collected: 05/20/25 1820    Specimen: Blood from Arm, Left Updated: 05/20/25 1825    POC Lactate [[id number]]  (Normal) Collected: 05/20/25 1812    Specimen: Blood Updated: 05/20/25 1814     Lactate 1.4 mmol/L      Comment: Serial Number: [id number]:  [id number]       POC Glucose Once [[id number]]  (Abnormal) Collected: 05/20/25 1803    Specimen: Blood Updated: 05/20/25 1805     Glucose 388 mg/dL      Comment: Serial Number: [id number]:  [id number]               XR Chest 1 View  Result Date: 5/21/2025  Impression: 1.New right internal jugular central venous catheter terminates in the upper right atrium. 2.Bibasilar atelectasis versus pneumonia. Electronically Signed: Clemente Dorman MD  5/21/2025 12:10 AM EDT  Workstation ID: [id number]    CT Abdomen Pelvis Without Contrast  Result Date: 5/20/2025  Impression: 1. Coronary calcification. 2. Patchy airspace opacity in the lung bases possibly secondary to atelectasis or pneumonia. 3. Generalized anasarca. Small amount of abdominal and pelvic free fluid. 4. Punctate bilateral nonobstructing renal calcifications. 5. There appear to be areas of wall thickening throughout the large and small bowel possibly secondary to  "enterocolitis. Ischemia is felt to be less likely given the diffuse appearing nature. Electronically Signed: Markos Holland MD  5/20/2025 9:57 PM EDT  Workstation ID: [id number]    CT Head Without Contrast  Result Date: 5/20/2025  Impression: New 5 cm x 2 cm area of low-attenuation in the left PCA distribution suspicious for an area of infarction. No evidence of acute hemorrhage or midline shift. Electronically Signed: Markos Holland MD  5/20/2025 8:48 PM EDT  Workstation ID: [id number]    XR Chest 1 View  Result Date: 5/20/2025  Mild bibasal infiltrates. Electronically Signed: Markos Holland MD  5/20/2025 7:38 PM EDT  Workstation ID: [id number]      CONSULTS   Consults       Date and Time Order Name Status Description    5/20/2025  8:54 PM Inpatient Neurology Consult Stroke Completed             CONDITION ON DISCHARGE    Stable    VITAL SIGNS  /84   Pulse 95   Temp 97.3 °F (36.3 °C)   Resp 22   Ht 162.6 cm (64\")   Wt 54.2 kg (119 lb 7.8 oz)   SpO2 93%   BMI 20.51 kg/m²     PHYSICAL EXAM  GEN:  Middle-aged woman who appears disheveled, acute-on-chronically ill, and much older than stated age.  Sitting up in bed on NC.  NAD.  NEURO:  Brainstem reflexes intact.  No obvious focal deficit.  Moves all 4 ext.  HEENT:  N/AT.  PERRL. Oropharynx non-erythematous.  No drainage from the eyes/ears/nose.  No conjunctival petechiae.  No oral thrush.  Auditory and visual acuity grossly wnl.  Poor dentition.  Voice normal.  NECK:  Supple, NT, trachea midline.  No meningismus.  No ROM limitation.    CHEST/LUNGS:  Breath sounds are clear and equal bilaterally.  No w/r/r.  Chest excursion equal bilaterally.    CARDIOVASCULAR:  RRR w/o murmur noted.    GI:  Abdomen soft, NT, ND, +BS.   :  No Fields cath in place.  EXTREMITIES:  No deformity or amputation.  No cyanosis, edema, or asymmetry.  Pulses 2+ and equal in BLE's.    SKIN:  Warm, dry, and pink.  No rash.  Multiple scabs on skin.  LYMPHATICS/HEME:  No overt LAD or abnormal " "bruising.  No lymphedema.  MSK:  Normal ROM.  No joint abnormalities noted.  Strength is 5/5 and equal in BUE and BLE's.  PSYCH:  A&Ox 3.  Normal mood and flat affect.  Responds appropriately to commands and appears to comprehend instructions.        DISCHARGE DISPOSITION  Hospice/Medical Facility ([facility])    DISCHARGE MEDICATIONS  Inter-facility transfer medications (From admission, onward)               diazePAM (VALIUM) injection 5 mg  ( IP MEDS DIAZEPAM IV/IM AND LORAZEPAM PO/SL LINKED (OR) PANEL)  Every 1 Hour PRN             scopolamine patch 1 mg/72 hr  Every 72 Hours PRN             atropine 1 % ophthalmic solution 2 drop  2 Times Daily PRN             glycopyrrolate (ROBINUL) injection 0.4 mg  (glycopyrrolate (ROBINUL) IV/SQ )  Every 2 Hours PRN             diphenoxylate-atropine (LOMOTIL) 2.5-0.025 MG per tablet 1 tablet  Every 2 Hours PRN             Polyvinyl Alcohol-Povidone PF (ARTIFICIAL TEARS) 1.4-0.6 % ophthalmic solution 1 drop  Every 30 Minutes PRN             acetaminophen (TYLENOL) tablet 650 mg  (acetaminophen (TYLENOL) PO/WV)  Every 4 Hours PRN        Placed in \"Or\" Linked Group        acetaminophen (TYLENOL) 160 MG/5ML oral solution 650 mg  (acetaminophen (TYLENOL) PO/WV)  Every 4 Hours PRN        Placed in \"Or\" Linked Group        acetaminophen (TYLENOL) suppository 650 mg  (acetaminophen (TYLENOL) PO/WV)  Every 4 Hours PRN        Placed in \"Or\" Linked Group        HYDROmorphone (DILAUDID) injection 1 mg  (hydromorphone (DILAUDID) IV or SQ)  Every 1 Hour PRN                              DISCHARGE DIET   NPO    ACTIVITY AT DISCHARGE   Bedrest      FOLLOW-UP APPOINTMENTS  No future appointments.        TEST RESULTS PENDING AT DISCHARGE  Pending Results       Procedure [Order ID] Specimen - Date/Time    Blood Culture - Blood, Arm, Left [[id number]] Collected: 05/20/25 1820    Specimen: Blood from Arm, Left Updated: 05/20/25 1825    Blood Culture - Blood, Wrist, Right " [[id number]] Collected: 05/20/25 1826    Specimen: Blood from Wrist, Right Updated: 05/20/25 1828    Vitamin B12 [[id number]] Collected: 05/20/25 1818    Specimen: Blood Updated: 05/21/25 1415              This note completed by this nurse practitioner on behalf of the critical care team, at the request of Dr. Lester, who agrees with transition in this new plan of care.    Time: Discharge 32 min    Bonifacio Benjamin, APRN  5/21/2025      Electronically signed by Alexus Lester DO at 05/21/25 1535       Discharge Order (From admission, onward)       Start     Ordered    05/21/25 1524  Discharge readmit patient  Once        Expected Discharge Date: 05/21/25   Discharge Disposition: Hospice/Medical Facility ([facility] [place])   Physician of Record for Attribution - Please select from Treatment Team: ALEXUS LESTER [[id number]]   Review needed by CMO to determine Physician of Record: No      Question Answer Comment   Physician of Record for Attribution - Please select from Treatment Team ALEXUS LESTER    Review needed by CMO to determine Physician of Record No        05/21/25 152

## 2025-05-22 NOTE — CASE MANAGEMENT/SOCIAL WORK
Case Management Discharge Note      Final Note: GIP - [facility]         Selected Continued Care - Discharged on 5/21/2025 Admission date: 5/20/2025 - Discharge disposition: Hospice/Medical Facility ([facility] [place])        Final Discharge Disposition Code: 51 - hospice medical facility    ANTHONY Rios RN  ICU/CVU   O: [phone]  C: [phone]  [email or web address]

## 2025-05-22 NOTE — NURSING NOTE
Spoke to Madie at [facility] for hope they will not follow if patient passes while in the hospital call with cardiac time of death

## 2025-05-22 NOTE — PAYOR COMM NOTE
"CHANGED TO [facility] HOSPICE ON 05/21/2025:        AUTHORIZATION PENDING:   PLEASE CALL OR FAX DETERMINATION TO CONTACT BELOW. THANK YOU.        Soraida Brink, RN MSN  /UR  [facility]  [phone] office  [phone] fax  [email or web address]    [facility]  NPI: [id number]  Tax: [phone]          Sheryl Stuart (54 y.o. Female)       Date of Birth   1970    Social Security Number       Address   [address] [place] [facility]    Home Phone   [phone]    MRN   [id number]       Protestant   None    Marital Status                               Admission Date   5/20/2025    Admission Type   Emergency    Admitting Provider   Ladarius Lester DO    Attending Provider       Department, Room/Bed   [facility] INTENSIVE CARE UNIT, 2302/1       Discharge Date   5/21/2025    Discharge Disposition   Hospice/Medical Facility ([facility] [place] [facility])    Discharge Destination                                 Attending Provider: (none)   Allergies: Penicillins    Isolation: None   Infection: None   Code Status: No CPR    Ht: 162.6 cm (64\")   Wt: 54.2 kg (119 lb 7.8 oz)    Admission Cmt: None   Principal Problem: DKA (diabetic ketoacidosis) [E11.10]                   Active Insurance as of 5/20/2025       Primary Coverage       Payor Plan Insurance Group Employer/Plan Group    [employer] [place] - [employer] [id number]       Payor Plan Address Payor Plan Phone Number Payor Plan Fax Number Effective Dates    MAIL STOP:   4/1/2017 - None Entered    [address] [address]       [facility] [address]         Subscriber Name Subscriber Birth Date Member ID       SHERYL STUART 1970 [id number]                     Emergency Contacts        (Rel.) Home Phone Work Phone Mobile Phone    Gladis Snyder (Daughter) -- -- [phone]    shawn stuart (Significant Other) -- -- [phone]                 Physician " Progress Notes (last 24 hours)        Ladarius Lester,  at 25 1328            Critical Care Progress Note     Sheryl Stuart : 1970 MRN:[id number] LOS:1  Rm: /     Principal Problem: DKA (diabetic ketoacidosis)     Reason for follow up: All the medical problems listed below    Summary     54 y.o. female with PMH of type 1 diabetes mellitus, non-STEMI, hypertension, GERD, chronic pain, COPD, and anxiety presented to the hospital for confusion.  Per report, family stated that the patient had been in bed and not eating or drinking x4 days. In the ED the patient was able to state her name only and follow some simple commands. Labs remarkable for ABG 6.988/7.5/120/1.8, , troponin 95, repeat troponin 88, anion gap 36.6, creatinine 2.27, glucose 397, calcium 8.2, phosphorus 6.6, alkaline phosphatase 359, albumin 2.2, hemoglobin A1c 10.3, osmolality 333, procalcitonin 1.17, PT 15.4, INR 1.22, WBC 22.38, hemoglobin 9.1.  UA showed trace amount of glucose, ketonuria, trace proteinuria.  UDS was positive for oxycodone which the patient is prescribed.  She received 2 A of bicarb, a full sepsis bolus, 1 additional liter IVF, cefepime, vancomycin, and was started on norepinephrine for persistent hypotension.  CT head showed a new 5 cm x 2 cm area of low-attenuation in the left PCA distribution suspicious for an area of infarction. No evidence of acute hemorrhage or midline shift. Her last known well time was days ago. Neurology consulted. CT abdomen/pelvis pending. CXR no infiltrates or effusions.   She was admitted to the ICU with a principal diagnosis of DKA (diabetic ketoacidosis).       ACP: No ACP documentation on file. Code status clarified with NOK, daughter, while patient's ex- that she lives with agrees, DNR/DNI.    Patient is on Hospital Day: 2.    Significant Events / Subjective     25 : Pt afeb, VSS.  A&Ox4.  Has only had 700 mL of UOP since admission, net +4.6 L.  Cr at  baseline.  CO2 up to 14, AG remains 21.  Hb 5.7--transfused and came up to 8.  Pt has elected that she does not wish to continue any further treatment and has asked for hospice.  Pt's daughter agreed with this when RN spoke with her on the phone.  Will likely go home with hospice.  Continue current treatment measures for now; will not escalate care.    Assessment / Plan     Diabetic ketoacidosis without coma, with history of diabetes mellitus, type 1  Metabolic acidosis  Etiology unclear  Anion gap of 36.6 on admission.  A1c 10.30  ABG 6.988/7 point 5/120/1.8  2 amps of sodium bicarbonate in ED  DKA protocol initiated  Insulin drip initiated per DKA protocol  Adjust iv fluids based on glucose, sodium, and potassium per protocol  Accu-Cheks every hour and serial labs as ordered  Continue protocol until resolved per protocol recommendations  Patient received a total of 3,497mL IVF boluses     Suspected acute ischemic stroke  Not a candidate for TNK d/t last know well time > 4.5 hours ago, LKW time was over 2-3 days ago  Ischemic Stroke orders initiated.  CT scan reviewed: New 5 cm x 2 cm area of low-attenuation in the left PCA distribution suspicious for an area of infarction. No evidence of acute hemorrhage or midline shift   ECHO with bubble study ordered    Check LDL and A1c.  Start moderate/high intensity statins  Neurology consulted, will see patient in the AM, did not want additional scans tonight  Observe for signs and symptoms of bleeding, if observed, immediately notify Neurology  NPO per Code Stroke Protocol, may advance diet if patient passes nursing bedside swallow evaluation  PT/ST/OT evaluation & treatment     Shock, hypovolemia vs septic ( WBC>12 or <4 or >10% bands, Temp > 100.4 or < 96.8, HR>90, Creatinine >2, Change in mental status, and MAP<65 or SBP<90 )  Hypoalbuminemia  Unclear etiology, will rule out infectious source  Patient is in DKA and reportedly has not been eating or drinking for at least  2 days  WBC 22.38, no bandemia, lactate 1.4, procalcitonin 1.17  Will do blood / urine / sputum cultures  CXR no infiltrates or effusions  CT abdomen/pelvis: Patchy airspace opacity in the lung bases possibly secondary to atelectasis or pneumonia. Generalized anasarca. There appear to be areas of wall thickening throughout the large and small bowel possibly secondary to enterocolitis. Ischemia is felt to be less likely given the diffuse appearing nature   GI PCR ordered  Started on cefepime and vancomycin  S/p sepsis bolus, plus 2 additional liters IVF, totaling 3497mL, persistent hypotension in spite of adequate fluid resuscitation  25g/25% albumin ordered q6h x3 doses  C/w additional fluids as needed. Avoid fluid overload.   On levophed, titrate vasopressors for a target MAP of 65.      Acute Kidney Injury  Elevated CK  Remains nonoliguric. Baseline creatinine 0.9-1  Creatinine 2.27 on admit  CT: Punctate bilateral nonobstructing renal calcifications.   Likely prerenal. Possible intrinsic component due to ATN from hypotension and potential infection  C/w IV fluids as ordered  Monitor Input/Output very closely  Avoids NSAIDs, nephrotoxic medications, and hypotension  Net IO Since Admission: 100 mL [05/21/25 0035]      Hypothermia  Hypothermic on admission, as low as 92.3F  Rebel hugger as need to achieve normothermia  TSH WNL 2.89     Hypocalcemia: 7.7, ionized calcium ordered, replace as needed     COPD: prn duonebs, on 2L nasal cannula, wean as tolerated, not hypoxemic  Hyperlipidemia: lipid panel in AM, patient does not appear to be on a statin per fill history  GERD: PPI  Chronic pain: continue prn oxycodone when appropriate  Hx NSTEMI, s/p PCI  Hx chronic diarrhea       Disposition:  Will downgrade to GIP after she meets with hospice.    Code status:   Code Status (Patient has no pulse and is not breathing): No CPR (Do Not Attempt to Resuscitate)  Medical Interventions (Patient has pulse or is breathing):  Limited Support  Medical Intervention Limits: No intubation (DNI)  Level Of Support Discussed With: Next of Kin (If No Surrogate)       Nutrition:   NPO Diet NPO Type: Strict NPO   Patient isn't on Tube Feeding     VTE Prophylaxis:  Pharmacologic & mechanical VTE prophylaxis orders are present.           Objective / Physical Exam     Vital signs:  Temp: 97.3 °F (36.3 °C)  BP: 142/84  Heart Rate: 95  Resp: 22  SpO2: 93 %  Weight: 54.2 kg (119 lb 7.8 oz)    Admission Weight: Weight: 49.9 kg (110 lb)  Current Weight: Weight: 54.2 kg (119 lb 7.8 oz)    Input/Output in last 24 hours:    Intake/Output Summary (Last 24 hours) at 5/21/2025 1329  Last data filed at 5/21/2025 1321  Gross per 24 hour   Intake 5344.04 ml   Output 704 ml   Net 4640.04 ml      Net IO Since Admission: 4,640.04 mL [05/21/25 1329]     Physical Exam     GEN:  Middle-aged woman who appears disheveled, acute-on-chronically ill, and much older than stated age.  Sitting up in bed on NC.  NAD.  NEURO:  Brainstem reflexes intact.  No obvious focal deficit.  Moves all 4 ext.  HEENT:  N/AT.  PERRL.  MMM.  Oropharynx non-erythematous.  No drainage from the eyes/ears/nose.  No conjunctival petechiae.  No oral thrush.  Auditory and visual acuity grossly wnl.  Poor dentition.  Voice normal.  NECK:  Supple, NT, trachea midline.  No meningismus.  No ROM limitation.  No torticollis.  No JVD.  No thyromegaly.    CHEST/LUNGS:  Breath sounds are clear and equal bilaterally.  No w/r/r.  Chest excursion equal bilaterally.    CARDIOVASCULAR:  RRR w/o murmur noted.    GI:  Abdomen soft, NT, ND, +BS.   :  No Fields cath in place.  EXTREMITIES:  No deformity or amputation.  No cyanosis, edema, or asymmetry.  Pulses 2+ and equal in BLE's.    SKIN:  Warm, dry, and pink.  No rash.  Multiple scabs on skin.  LYMPHATICS/HEME:  No overt LAD or abnormal bruising.  No lymphedema.  MSK:  Normal ROM.  No joint abnormalities noted.  Strength is 5/5 and equal in BUE and BLE's.  PSYCH:   A&Ox 3.  Normal mood and flat affect.  Responds appropriately to commands and appears to comprehend instructions.            Radiology and Labs     Results from last 7 days   Lab Units 05/21/25  1203 05/21/25  0542 05/21/25  0520 05/20/25  1820   WBC 10*3/mm3  --  12.92* 13.31* 22.38*   HEMOGLOBIN g/dL 8.3* 5.7* 5.8* 9.1*   HEMATOCRIT % 26.5* 18.6* 19.2* 32.9*   PLATELETS 10*3/mm3  --  183 192 364      Results from last 7 days   Lab Units 05/20/25  1820   PROTIME Seconds 15.4*   INR  1.22*   APTT seconds 28.0      Results from last 7 days   Lab Units 05/21/25  1203 05/21/25  0841 05/21/25  0452 05/21/25  0053 05/20/25 2003 05/20/25  1820   SODIUM mmol/L 143 143 143 145 141 138   POTASSIUM mmol/L 3.7 3.4* 3.7 3.8 4.7 4.8   CHLORIDE mmol/L 108* 105 105 106 102 98   CO2 mmol/L 14.4* 10.5* 9.6* 6.0* 3.1* 3.4*   ANION GAP mmol/L 20.6* 27.5* 28.4* 33.0* 35.9* 36.6*   BUN mg/dL 17 17 17 17 17 18   CREATININE mg/dL 2.06* 2.05* 2.04* 1.97* 2.12* 2.27*   GLUCOSE mg/dL 160* 202* 230* 245* 377* 397*   PHOSPHORUS mg/dL 2.3* 2.5 3.2 4.5 6.2* 6.6*   MAGNESIUM mg/dL 1.7 1.7 1.9 1.9 2.2 2.3   ALT (SGPT) U/L  --   --  13  --   --  18   AST (SGOT) U/L  --   --  29  --   --  23   ALK PHOS U/L  --   --  235*  --   --  359*      Results from last 7 days   Lab Units 05/21/25  0452 05/20/25  1820   ALT (SGPT) U/L 13 18   AST (SGOT) U/L 29 23   ALK PHOS U/L 235* 359*     Results from last 7 days   Lab Units 05/20/25  2334 05/20/25  1908   PH, ARTERIAL pH units  --  6.988*   PCO2, ARTERIAL mm Hg  --  7.5*   PO2 ART mm Hg  --  120.6*   O2 SATURATION ART %  --  96.0   FIO2 % 21 21   HCO3 ART mmol/L  --  1.8*   BASE EXCESS ART mmol/L  --  -27.5*       XR Chest 1 View  Result Date: 5/21/2025  Impression: 1.New right internal jugular central venous catheter terminates in the upper right atrium. 2.Bibasilar atelectasis versus pneumonia. Electronically Signed: Clemente Dorman MD  5/21/2025 12:10 AM EDT  Workstation ID: [id number]    CT Abdomen Pelvis  Without Contrast  Result Date: 5/20/2025  Impression: 1. Coronary calcification. 2. Patchy airspace opacity in the lung bases possibly secondary to atelectasis or pneumonia. 3. Generalized anasarca. Small amount of abdominal and pelvic free fluid. 4. Punctate bilateral nonobstructing renal calcifications. 5. There appear to be areas of wall thickening throughout the large and small bowel possibly secondary to enterocolitis. Ischemia is felt to be less likely given the diffuse appearing nature. Electronically Signed: Markos Holland MD  5/20/2025 9:57 PM EDT  Workstation ID: [id number]    CT Head Without Contrast  Result Date: 5/20/2025  Impression: New 5 cm x 2 cm area of low-attenuation in the left PCA distribution suspicious for an area of infarction. No evidence of acute hemorrhage or midline shift. Electronically Signed: Markos Holland MD  5/20/2025 8:48 PM EDT  Workstation ID: [id number]    XR Chest 1 View  Result Date: 5/20/2025  Mild bibasal infiltrates. Electronically Signed: Markos Holland MD  5/20/2025 7:38 PM EDT  Workstation ID: [id number]      Current medications     Scheduled Meds:   aspirin, 325 mg, Oral, Daily   Or  aspirin, 300 mg, Rectal, Daily  atorvastatin, 80 mg, Oral, Nightly  [Held by provider] carbidopa-levodopa, 1 tablet, Oral, BID  [Held by provider] carvedilol, 12.5 mg, Oral, BID With Meals  cefTRIAXone, 1,000 mg, Intravenous, Q24H  heparin (porcine), 5,000 Units, Subcutaneous, Q8H  mupirocin, 1 Application, Each Nare, BID  pantoprazole, 40 mg, Intravenous, Q AM  senna-docusate sodium, 2 tablet, Oral, BID  sodium chloride, 10 mL, Intravenous, Q12H  sodium chloride, 10 mL, Intravenous, Q12H  sodium chloride, 10 mL, Intravenous, Q12H        Continuous Infusions:   dextrose 5 % and sodium chloride 0.45 %, 125 mL/hr  dextrose 5 % and sodium chloride 0.45 % with KCl 20 mEq/L, 125 mL/hr, Last Rate: 125 mL/hr (05/21/25 1312)  dextrose 5 % and sodium chloride 0.45 % with KCl 40 mEq/L, 125 mL/hr  dextrose 5  % and sodium chloride 0.9 %, 125 mL/hr  dextrose 5 % and sodium chloride 0.9 % with KCl 20 mEq, 125 mL/hr  dextrose 5% and sodium chloride 0.9% with KCl 40 mEq/L, 125 mL/hr  insulin, 0-100 Units/hr, Last Rate: 12.1 Units/hr (05/21/25 1317)  sodium chloride 0.45 % 1,000 mL with potassium chloride 40 mEq infusion, 200 mL/hr  sodium chloride, 200 mL/hr  sodium chloride 0.45 % with KCl 20 mEq, 200 mL/hr  sodium chloride, 200 mL/hr  sodium chloride 0.9 % with KCl 20 mEq, 200 mL/hr, Last Rate: Stopped (05/21/25 0143)  sodium chloride 0.9 % with KCl 40 mEq/L, 200 mL/hr          Plan discussed with RN. Reviewed all other data in the last 24 hours, including but not limited to vitals, labs, microbiology, imaging and pertinent notes from other providers.  Plan also discussed with pt at the bedside.      Ladarius Lester DO   Critical Care  05/21/25   13:29 EDT       Electronically signed by Ladarius Lester DO at 05/21/25 1352          Consult Notes (last 24 hours)        Hansa Osullivan MD at 05/21/25 1310        Consult Orders    1. Inpatient Neurology Consult Stroke [[id number]] ordered by Juliana Fletcher APRN at 05/20/25 2054                 Primary Care Provider: Provider, No Known     Consult requested by: ICU team    Reason for Consultation: Neurological evaluation /abnormal head CT    History taken from: chart RN    Chief complaint: DKA    Subjective   SUBJECTIVE:    History of present illness: Background per H&P: Sheryl Stuart is a 54 y.o. female who was evaluated in room ICU 2302 at [facility]    The patient's nurse was present    Source of information is mostly the medical records    Vital signs demonstrated some hypotension, as low as 77/48  Random glucose now 160, creatinine 2.06  Calcium 7.3  Last H&H was 8.3 and 26.5    The H&H dropped to as low as 5.7 and 18.6  When she came in her white count was 22.38, RBC count 3.23 and H&H 9.1 and 32.9 with platelet count of 364 she had moderate  acetone phosphorus 6.6 hemoglobin A1c 10.30 serum Osmo 333 abnormal ABGs    Her CT head was abnormal showing age-indeterminate infarct which looks more MCA branch to me,        Impression:   New 5 cm x 2 cm area of low-attenuation in the left PCA distribution suspicious for an area of infarction. No evidence of acute hemorrhage or midline shift.       Electronically Signed: Markos Holland MD    2025 8:48 PM EDT    Workstation ID: [id number]       Once I got the report, we had no last known well so my request was to get MRI brain and CTA head and neck or MRA head and neck depending upon which will be possible when we can but the patient has been too sick to go for any testing and her renal functions are bad    She is confused     Because of her confusion it is very questionable if she has any field cuts she has significant lower extremity weakness    But that is a possibility    It looks like she is on aspirin at home    We have no other imaging studies to compare    As per admitting,  Sheryl Stuart : 1970 MRN:[id number] LOS:1 ROOM: [facility]      Reason for admission: DKA (diabetic ketoacidosis)      Assessment / Plan      Diabetic ketoacidosis without coma, with history of diabetes mellitus, type 1  Metabolic acidosis  Etiology unclear  Anion gap of 36.6 on admission.  A1c 10.30  ABG 6.988/7 point 5/120/1.8  2 amps of sodium bicarbonate in ED  DKA protocol initiated  Insulin drip initiated per DKA protocol  Adjust iv fluids based on glucose, sodium, and potassium per protocol  Accu-Cheks every hour and serial labs as ordered  Continue protocol until resolved per protocol recommendations  Patient received a total of 3,497mL IVF boluses     Suspected acute ischemic stroke  Not a candidate for TNK d/t last know well time > 4.5 hours ago, LKW time was over 2-3 days ago  Ischemic Stroke orders initiated.  CT scan reviewed: New 5 cm x 2 cm area of low-attenuation in the left PCA distribution suspicious for an  area of infarction. No evidence of acute hemorrhage or midline shift   ECHO with bubble study ordered    Check LDL and A1c.  Start moderate/high intensity statins  Neurology consulted, will see patient in the AM, did not want additional scans tonight  Observe for signs and symptoms of bleeding, if observed, immediately notify Neurology  NPO per Code Stroke Protocol, may advance diet if patient passes nursing bedside swallow evaluation  PT/ST/OT evaluation & treatment     Shock, hypovolemia vs septic ( WBC>12 or <4 or >10% bands, Temp > 100.4 or < 96.8, HR>90, Creatinine >2, Change in mental status, and MAP<65 or SBP<90 )  Hypoalbuminemia  Unclear etiology, will rule out infectious source  Patient is in DKA and reportedly has not been eating or drinking for at least 2 days  WBC 22.38, no bandemia, lactate 1.4, procalcitonin 1.17  Will do blood / urine / sputum cultures  CXR no infiltrates or effusions  CT abdomen/pelvis: Patchy airspace opacity in the lung bases possibly secondary to atelectasis or pneumonia. Generalized anasarca. There appear to be areas of wall thickening throughout the large and small bowel possibly secondary to enterocolitis. Ischemia is felt to be less likely given the diffuse appearing nature   GI PCR ordered  Started on cefepime and vancomycin  S/p sepsis bolus, plus 2 additional liters IVF, totaling 3497mL, persistent hypotension in spite of adequate fluid resuscitation  25g/25% albumin ordered q6h x3 doses  C/w additional fluids as needed. Avoid fluid overload.   On levophed, titrate vasopressors for a target MAP of 65.      Acute Kidney Injury  Elevated CK  Remains nonoliguric. Baseline creatinine 0.9-1  Creatinine 2.27 on admit  CT: Punctate bilateral nonobstructing renal calcifications.   Likely prerenal. Possible intrinsic component due to ATN from hypotension and potential infection  C/w IV fluids as ordered  Monitor Input/Output very closely  Avoids NSAIDs, nephrotoxic medications, and  hypotension  Net IO Since Admission: 100 mL [05/21/25 0035]      Hypothermia  Hypothermic on admission, as low as 92.3F  Rebel hugger as need to achieve normothermia  TSH WNL 2.89     Hypocalcemia: 7.7, ionized calcium ordered, replace as needed     COPD: prn duonebs, on 2L nasal cannula, wean as tolerated, not hypoxemic  Hyperlipidemia: lipid panel in AM, patient does not appear to be on a statin per fill history  GERD: PPI  Chronic pain: continue prn oxycodone when appropriate  Hx NSTEMI, s/p PCI  Hx chronic diarrhea     Nutrition:   NPO Diet NPO Type: Strict NPO      VTE Prophylaxis:  Mechanical VTE prophylaxis orders are present.           History of Present illness      Sheryl Stuart is a 54 y.o. female with PMH of type 1 diabetes mellitus, non-STEMI, hypertension, GERD, chronic pain, COPD, and anxiety presented to the hospital for confusion.  Per report, family stated that the patient had been in bed and not eating or drinking x4 days. In the ED the patient was able to state her name only and follow some simple commands. Labs remarkable for ABG 6.988/7.5/120/1.8, , troponin 95, repeat troponin 88, anion gap 36.6, creatinine 2.27, glucose 397, calcium 8.2, phosphorus 6.6, alkaline phosphatase 359, albumin 2.2, hemoglobin A1c 10.3, osmolality 333, procalcitonin 1.17, PT 15.4, INR 1.22, WBC 22.38, hemoglobin 9.1.  UA showed trace amount of glucose, ketonuria, trace proteinuria.  UDS was positive for oxycodone which the patient is prescribed.  She received 2 A of bicarb, a full sepsis bolus, 1 additional liter IVF, cefepime, vancomycin, and was started on norepinephrine for persistent hypotension.  CT head showed a new 5 cm x 2 cm area of low-attenuation in the left PCA distribution suspicious for an area of infarction. No evidence of acute hemorrhage or midline shift. Her last known well time was days ago. Neurology consulted. CT abdomen/pelvis pending. CXR no infiltrates or effusions.   She was admitted  to the ICU with a principal diagnosis of DKA (diabetic ketoacidosis).         ACP: No ACP documentation on file. Code status clarified with NOK, daughter, while patient's ex- that she lives with agrees, DNR/DNI.     Patient was seen and examined on 05/20/25 at 00:35 EDT .                  - Portions of the above HPI were copied from previous encounters and edited as appropriate. PMH as detailed below.     Review of Systems   Very questionable considering her present condition    PATIENT HISTORY:  History reviewed. No pertinent past medical history., History reviewed. No pertinent surgical history., History reviewed. No pertinent family history.,  ,   Prior to Admission medications    Medication Sig Start Date End Date Taking? Authorizing Provider   aspirin 81 MG EC tablet Take 1 tablet by mouth Daily.    Minor Carbone MD   carbidopa-levodopa (SINEMET)  MG per tablet Take 1 tablet by mouth 2 (Two) Times a Day.    Minor Carbone MD   carvedilol (COREG) 12.5 MG tablet Take 1 tablet by mouth 2 (Two) Times a Day With Meals.    Minor Carbone MD   doxycycline (VIBRAMYICN) 100 MG tablet Take 1 tablet by mouth 2 (Two) Times a Day.    Minor Carbone MD   loperamide (IMODIUM) 2 MG capsule Take 1 capsule by mouth 4 (Four) Times a Day As Needed for Diarrhea.    Minor Carbone MD   omeprazole (priLOSEC) 40 MG capsule Take 1 capsule by mouth Daily.    Minor Carbone MD   ondansetron (ZOFRAN) 4 MG tablet Take 1 tablet by mouth Every 8 (Eight) Hours As Needed for Nausea or Vomiting.    Minor Carbone MD   oxyCODONE-acetaminophen (PERCOCET)  MG per tablet Take 1 tablet by mouth Every 6 (Six) Hours As Needed for Moderate Pain.    Minor Carbone MD   POTASSIUM GLUCONATE PO Take 650 mg by mouth Daily.    Minor Carbone MD   vitamin D3 125 MCG (5000 UT) capsule capsule Take 1 capsule by mouth Daily.    Minor Carbone MD    Allergies:   Penicillins    Current Facility-Administered Medications   Medication Dose Route Frequency Provider Last Rate Last Admin    acetaminophen (TYLENOL) tablet 650 mg  650 mg Oral Q4H PRN Early, ERICA Singh        Or    acetaminophen (TYLENOL) suppository 650 mg  650 mg Rectal Q4H PRN Early, ERICA Singh        aluminum-magnesium hydroxide-simethicone (MAALOX MAX) 400-400-40 MG/5ML suspension 15 mL  15 mL Oral Q6H PRN Early, ERICA Singh        aspirin tablet 325 mg  325 mg Oral Daily Early, ERICA Singh        Or    aspirin suppository 300 mg  300 mg Rectal Daily Early, ERICA Singh   300 mg at 05/21/25 0432    atorvastatin (LIPITOR) tablet 80 mg  80 mg Oral Nightly Early, ERICA Singh        sennosides-docusate (PERICOLACE) 8.6-50 MG per tablet 2 tablet  2 tablet Oral BID Early, ERICA Singh        And    polyethylene glycol (MIRALAX) packet 17 g  17 g Oral Daily PRN Early, ERICA Singh        And    bisacodyl (DULCOLAX) EC tablet 5 mg  5 mg Oral Daily PRN Early, ERICA Singh        And    bisacodyl (DULCOLAX) suppository 10 mg  10 mg Rectal Daily PRN Early, ERICA Singh        Calcium Replacement - Follow Nurse / BPA Driven Protocol   Not Applicable PRN Rudy Mcelroy MD        [Held by provider] carbidopa-levodopa (SINEMET)  MG per tablet 1 tablet  1 tablet Oral BID Bonifacio Benjamin APRN        [Held by provider] carvedilol (COREG) tablet 12.5 mg  12.5 mg Oral BID With Meals Bonifacio Benjamin APRN        cefTRIAXone (ROCEPHIN) 1,000 mg in sodium chloride 0.9 % 100 mL MBP  1,000 mg Intravenous Q24H Ladarius Lester DO        dextrose (D50W) (25 g/50 mL) IV injection 10-50 mL  10-50 mL Intravenous Q15 Min PRN Rudy Mcelroy MD        dextrose (GLUTOSE) oral gel 15 g  15 g Oral Q15 Min PRN Rudy Mcelroy MD        dextrose 5 % and sodium chloride 0.45 % infusion  125 mL/hr Intravenous Continuous PRN Rudy Mcelroy MD        dextrose 5 % and sodium chloride 0.45 % with KCl 20 mEq/L  infusion  125 mL/hr Intravenous Continuous PRN Rudy Mcelroy  mL/hr at 05/21/25 0927 125 mL/hr at 05/21/25 0927    dextrose 5 % and sodium chloride 0.45 % with KCl 40 mEq/L infusion  125 mL/hr Intravenous Continuous PRN Rudy Mcelroy MD        dextrose 5 % and sodium chloride 0.9 % infusion  125 mL/hr Intravenous Continuous PRN Rudy Mcelroy MD        dextrose 5 % and sodium chloride 0.9 % with KCl 20 mEq/L infusion  125 mL/hr Intravenous Continuous PRN Rudy Mcelroy MD        dextrose 5 % and sodium chloride 0.9 % with KCl 40 mEq/L infusion  125 mL/hr Intravenous Continuous PRN Rudy Mcelroy MD        Glucagon (GLUCAGEN) injection 1 mg  1 mg Intramuscular Q15 Min PRN Rudy Mcelroy MD        heparin (porcine) 5000 UNIT/ML injection 5,000 Units  5,000 Units Subcutaneous Q8H Ladarius Lester DO        insulin regular 1 unit/mL in 0.9% sodium chloride (Glucommander)  0-100 Units/hr Intravenous Titrated Rudy Mcelroy MD 16.6 mL/hr at 05/21/25 1206 16.6 Units/hr at 05/21/25 1206    ipratropium-albuterol (DUO-NEB) nebulizer solution 3 mL  3 mL Nebulization Q6H PRN Early, ERICA Singh        Magnesium Standard Dose Replacement - Follow Nurse / BPA Driven Protocol   Not Applicable PRRudy Franco MD        mupirocin (BACTROBAN) 2 % nasal ointment 1 Application  1 Application Each Nare BID Early, ERICA Singh   1 Application at 05/21/25 0818    nitroglycerin (NITROSTAT) SL tablet 0.4 mg  0.4 mg Sublingual Q5 Min PRN Early, ERICA Singh        pantoprazole (PROTONIX) injection 40 mg  40 mg Intravenous Q AM Early, ERICA Singh   40 mg at 05/21/25 0516    Phosphorus Replacement - Follow Nurse / BPA Driven Protocol   Not Applicable PRRudy Franco MD        Potassium Replacement - Follow Nurse / BPA Driven Protocol   Not Applicable PRRudy Franco MD        prochlorperazine (COMPAZINE) injection 5 mg  5 mg Intravenous Q6H PRN Early, Juliana MARTE, APRN         sodium chloride 0.45 % 1,000 mL with potassium chloride 40 mEq infusion  200 mL/hr Intravenous Continuous PRRudy Franco MD        sodium chloride 0.45 % infusion  200 mL/hr Intravenous Continuous Rudy Ndiaye MD        sodium chloride 0.45 % with KCl 20 mEq/L infusion  200 mL/hr Intravenous Continuous Rudy Ndiaye MD        sodium chloride 0.9 % flush 10 mL  10 mL Intravenous PRN Rudy Mcelroy MD        sodium chloride 0.9 % flush 10 mL  10 mL Intravenous Q12H Rudy Mcelroy MD   10 mL at 05/21/25 0818    sodium chloride 0.9 % flush 10 mL  10 mL Intravenous PRN Rudy Mcelroy MD        sodium chloride 0.9 % flush 10 mL  10 mL Intravenous Q12H Early, Juliana A, APRN   10 mL at 05/21/25 0818    sodium chloride 0.9 % flush 10 mL  10 mL Intravenous Q12H Early, Juliana A, APRN   10 mL at 05/21/25 0818    sodium chloride 0.9 % infusion 40 mL  40 mL Intravenous PRRudy Franco MD        sodium chloride 0.9 % infusion  200 mL/hr Intravenous Continuous PRRudy Franco MD        sodium chloride 0.9 % with KCl 20 mEq/L infusion  200 mL/hr Intravenous Continuous PRRudy Franco MD   Stopped at 05/21/25 0143    sodium chloride 0.9 % with KCl 40 mEq/L infusion  200 mL/hr Intravenous Continuous PRRudy Franco MD            ________________________________________________________     Objective   OBJECTIVE:  Upon today's exam, The patient is still encephalopathic and restless but otherwise in no acute distress right now      The patient is awake and alert and oriented x self in the hospital   not oriented to time    She can name and follow commands     Cranial nerve examination demonstrate:  Questionable if there is right inferior quadrantanopia in the right eye and questionable inferior nasal on the left side  Pupils are round, reactive to light and accommodation and size of about 3 mm  No ptosis or nystagmus  Funduscopic examination was not successful  Eye  movements are conjugate     Sensation on the face and scalp are normal  Muscles of mastication are normal and symmetric  Muscles of  facial expression are normal and symmetric  Hearing is intact bilaterally  Head turning and shoulder shrugs were unremarkable  Tongue was midline  I could not visualize  oropharynx or uvula     Motor examination:  Normal bulk, tone and strength was 5- in upper extremities and lower extremities she is really moving it minimally  No fasciculations     Sensory examination:  Intact for soft touch, pain   Romberg was not evaluated     Reflexes:  0/4     Coordination:  Not to finger-nose-finger or heel-to-shin     Gait:  Deferred     Toe signs:  Mute      NIH Stroke Scale  Interval: -- (arrival to ICU)  1a. Level of Consciousness: 1-->Not alert, but arousable by minor stimulation to obey, answer, or respond  1b. LOC Questions: 0-->Answers both questions correctly  1c. LOC Commands: 0-->Performs both tasks correctly  2. Best Gaze: 0-->Normal  3. Visual: 0-->No visual loss  4. Facial Palsy: 0-->Normal symmetrical movements  5a. Motor Arm, Left: 0-->No drift, limb holds 90 (or 45) degrees for full 10 secs  5b. Motor Arm, Right: 0-->No drift, limb holds 90 (or 45) degrees for full 10 secs  6a. Motor Leg, Left: 2-->Some effort against gravity, leg falls to bed by 5 secs, but has some effort against gravity  6b. Motor Leg, Right: 2-->Some effort against gravity, leg falls to bed by 5 secs, but has some effort against gravity  7. Limb Ataxia: 0-->Absent  8. Sensory: 0-->Normal, no sensory loss  9. Best Language: 1-->Mild-to-moderate aphasia, some obvious loss of fluency or facility of comprehension, without significant limitation on ideas expressed or form of expression. Reduction of speech and/or comprehension, however, makes conversation. . . (see row details)  10. Dysarthria: 1-->Mild-to-moderate dysarthria, patient slurs at least some words and, at worst, can be understood with some  difficulty  11. Extinction and Inattention (formerly Neglect): 0-->No abnormality  Total (NIH Stroke Scale): 7         ________________________________________________________   RESULTS REVIEW:    VITAL SIGNS:   Temp:  [92.3 °F (33.5 °C)-98.6 °F (37 °C)] 97.3 °F (36.3 °C)  Heart Rate:  [] 95  Resp:  [12-22] 22  BP: ()/(42-89) 142/84     LABS:      Lab 05/21/25  1203 05/21/25  0542 05/21/25  0520 05/20/25 1820 05/20/25  1812   WBC  --  12.92* 13.31* 22.38*  --    HEMOGLOBIN 8.3* 5.7* 5.8* 9.1*  --    HEMATOCRIT 26.5* 18.6* 19.2* 32.9*  --    PLATELETS  --  183 192 364  --    NEUTROS ABS  --  10.35* 10.60* 19.30*  --    IMMATURE GRANS (ABS)  --  0.18* 0.13* 0.59*  --    LYMPHS ABS  --  2.01 2.16 1.85  --    MONOS ABS  --  0.37 0.41 0.58  --    EOS ABS  --  0.00 0.00 0.03  --    MCV  --  93.5 94.1 101.9*  --    PROCALCITONIN  --   --   --  1.17*  --    LACTATE  --   --   --   --  1.4   PROTIME  --   --   --  15.4*  --    APTT  --   --   --  28.0  --          Lab 05/21/25  1203 05/21/25  0841 05/21/25  0452 05/21/25  0053 05/20/25 2003 05/20/25  1820   SODIUM 143 143 143 145 141 138   POTASSIUM 3.7 3.4* 3.7 3.8 4.7 4.8   CHLORIDE 108* 105 105 106 102 98   CO2 14.4* 10.5* 9.6* 6.0* 3.1* 3.4*   ANION GAP 20.6* 27.5* 28.4* 33.0* 35.9* 36.6*   BUN 17 17 17 17 17 18   CREATININE 2.06* 2.05* 2.04* 1.97* 2.12* 2.27*   EGFR 28.4* 28.5* 28.7* 29.9* 27.4* 25.2*   GLUCOSE 160* 202* 230* 245* 377* 397*   CALCIUM 7.3* 7.3* 7.4* 7.2* 7.7* 8.2*   IONIZED CALCIUM  --   --   --  1.12*  --   --    MAGNESIUM 1.7 1.7 1.9 1.9 2.2 2.3   PHOSPHORUS 2.3* 2.5 3.2 4.5 6.2* 6.6*   HEMOGLOBIN A1C  --   --   --   --   --  10.30*   TSH  --   --   --   --   --  2.890         Lab 05/21/25 0452 05/20/25  1820   TOTAL PROTEIN 4.6* 5.1*   ALBUMIN 3.0* 2.2*   GLOBULIN 1.6 2.9   ALT (SGPT) 13 18   AST (SGOT) 29 23   BILIRUBIN 0.3 0.2   ALK PHOS 235* 359*   LIPASE  --  21         Lab 05/20/25 2003 05/20/25  1820   HSTROP T 88* 95*   PROTIME   --  15.4*   INR  --  1.22*         Lab 05/21/25  0452   CHOLESTEROL 74   LDL CHOL 18   HDL CHOL 27*   TRIGLYCERIDES 180*         Lab 05/21/25  0841 05/21/25  0452 05/20/25  1840   IRON 14*  --   --    IRON SATURATION (TSAT) 23  --   --    TIBC 60*  --   --    TRANSFERRIN 40*  --   --    FERRITIN  --  558.00*  --    ABO TYPING  --   --  A   RH TYPING  --   --  Positive   ANTIBODY SCREEN  --   --  Negative         Lab 05/20/25  2334 05/20/25  1908   PH, ARTERIAL  --  6.988*   PCO2, ARTERIAL  --  7.5*   PO2 ART  --  120.6*   O2 SATURATION ART  --  96.0   FIO2 21 21   HCO3 ART  --  1.8*   BASE EXCESS ART  --  -27.5*     UA          5/20/2025    18:36   Urinalysis   Specific [place], UA 1.015    Ketones, UA 15 mg/dL (1+)    Blood, UA Negative    Leukocytes, UA Negative    Nitrite, UA Negative        Lab Results   Component Value Date    TSH 2.890 05/20/2025    LDL 18 05/21/2025    HGBA1C 10.30 (H) 05/20/2025       IMAGING STUDIES:  XR Chest 1 View  Result Date: 5/21/2025  Impression: 1.New right internal jugular central venous catheter terminates in the upper right atrium. 2.Bibasilar atelectasis versus pneumonia. Electronically Signed: Clemente Dorman MD  5/21/2025 12:10 AM EDT  Workstation ID: [id number]    CT Abdomen Pelvis Without Contrast  Result Date: 5/20/2025  Impression: 1. Coronary calcification. 2. Patchy airspace opacity in the lung bases possibly secondary to atelectasis or pneumonia. 3. Generalized anasarca. Small amount of abdominal and pelvic free fluid. 4. Punctate bilateral nonobstructing renal calcifications. 5. There appear to be areas of wall thickening throughout the large and small bowel possibly secondary to enterocolitis. Ischemia is felt to be less likely given the diffuse appearing nature. Electronically Signed: Markos Holland MD  5/20/2025 9:57 PM EDT  Workstation ID: [id number]    CT Head Without Contrast  Result Date: 5/20/2025  Impression: New 5 cm x 2 cm area of low-attenuation in the left PCA  distribution suspicious for an area of infarction. No evidence of acute hemorrhage or midline shift. Electronically Signed: Markos Holland MD  5/20/2025 8:48 PM EDT  Workstation ID: [id number]    XR Chest 1 View  Result Date: 5/20/2025  Mild bibasal infiltrates. Electronically Signed: Markos Holland MD  5/20/2025 7:38 PM EDT  Workstation ID: [id number]      I reviewed the patient's new clinical results.    ________________________________________________________     PROBLEM LIST:    DKA (diabetic ketoacidosis)            ASSESSMENT/PLAN:  Mental status changes which could be multifactorial toxic and metabolic encephalopathy  Abnormal head CT, it looks like a posterior branch of MCA but the patient is tilted so PCA abnormalities there, specifically if you look at the shape of it I will get MRI brain and MRA head and neck whenever possible      Her renal function was not good specially the creatinine level so I may not be able to get a CTA or with contrast studies her H&H is dropping and we do not know why        So aspirin alone may be the way to go up so far    Other test to be ordered she was not tenecteplase or intervention candidate as we do not know the last known well and also her deficits which are nonspecific          Modification of stroke risk factors:   - Blood pressure should be less than 130/80 outpatient, HbA1c less than 6.5, LDL less than 70; b12>500 and smoking cessation if applicable. We would be grateful if the primary team / primary care physician would keep a close watch on the above targets.  - Stroke education  - Follow up with neurologist of choice      I discussed the patient's findings and my recommendations with patient and nursing staff    Hansa Osullivan MD  05/21/25  13:10 EDT          Electronically signed by Hansa Osullivan MD at 05/21/25 1412          Discharge Summary        Bonifacio Benjamin APRN at 05/21/25 1526       Attestation signed by Ladarius Lester DO at 05/21/25 1530       I have reviewed this documentation and agree.                        CRITICAL CARE DISCHARGE SUMMARY           PATIENT NAME:  Sheryl Stuart             :  1970            MRN:  [id number]    DATE OF ADMISSION: 2025     DATE OF DISCHARGE: 2025    DISCHARGE DIAGNOSES:   Diabetic ketoacidosis without coma, with history of diabetes mellitus, type 1  Metabolic acidosis  Suspected acute ischemic stroke  Dysphagia  Shock, hypovolemia vs septic   Hypoalbuminemia  Acute Kidney Injury  Elevated CK  Hypothermia, resolved  Hypocalcemia  COPD  Hyperlipidemia  GERD  Chronic pain  Hx NSTEMI, s/p PCI  Hx chronic diarrhea      HOSPITAL COURSE  Patient is a 54 y.o. female with PMH of type 1 diabetes mellitus, non-STEMI, hypertension, GERD, chronic pain, COPD, and anxiety presented to the hospital for confusion.  Per report, family stated that the patient had been in bed and not eating or drinking x4 days. In the ED the patient was able to state her name only and follow some simple commands. Labs remarkable for ABG 6.988/7.5/120/1.8, , troponin 95, repeat troponin 88, anion gap 36.6, creatinine 2.27, glucose 397, calcium 8.2, phosphorus 6.6, alkaline phosphatase 359, albumin 2.2, hemoglobin A1c 10.3, osmolality 333, procalcitonin 1.17, PT 15.4, INR 1.22, WBC 22.38, hemoglobin 9.1.  UA showed trace amount of glucose, ketonuria, trace proteinuria.  UDS was positive for oxycodone which the patient is prescribed.  She received 2 A of bicarb, a full sepsis bolus, 1 additional liter IVF, cefepime, vancomycin, and was started on norepinephrine for persistent hypotension.  CT head showed a new 5 cm x 2 cm area of low-attenuation in the left PCA distribution suspicious for an area of infarction. No evidence of acute hemorrhage or midline shift. Her last known well time was days ago. Neurology consulted. CT abdomen/pelvis pending. CXR no infiltrates or effusions.     She was admitted to the ICU with a principal  diagnosis of DKA (diabetic ketoacidosis). On 5/21, patient afebrile, VSS.  A&Ox4.  Has only had 700 mL of UOP since admission, net +4.6 L.  Cr at baseline.  CO2 up to 14, AG remains 21.  Hb 5.7--transfused and came up to 8.  Pt has elected that she does not wish to continue any further treatment and has asked for hospice.  Pt's daughter agreed with this when RN spoke with her on the phone.  Will likely go home with hospice.  Continue current treatment measures for now; will not escalate care.  Patient did not pass her bedside swallow evaluation, and when given option to proceed with a video swallow, decided against proceeding with this test.  She stated that she would prefer to go comfort measures.  She was seen and evaluated by neurology.    Patient was accepted for transition to [facility] with [facility].  Patient will be readmitted to the hospitalist team.  Comfort orders in place.      PROCEDURES PERFORMED    05/20 2328 Insert Central Line At Bedside    LABS/RADIOLOGICAL STUDIES  Lab Results (last 72 hours)       Procedure Component Value Units Date/Time    POC Glucose Once [[id number]]  (Abnormal) Collected: 05/21/25 1457    Specimen: Blood Updated: 05/21/25 1458     Glucose 126 mg/dL      Comment: Serial Number: [id number]:  [id number]       Vitamin B12 [[id number]] Collected: 05/20/25 1818    Specimen: Blood Updated: 05/21/25 1415    POC Glucose Once [[id number]]  (Abnormal) Collected: 05/21/25 1310    Specimen: Blood Updated: 05/21/25 1332     Glucose 143 mg/dL      Comment: Serial Number: [id number]:  [id number]       POC Glucose Once [[id number]]  (Abnormal) Collected: 05/21/25 1202    Specimen: Blood Updated: 05/21/25 1332     Glucose 174 mg/dL      Comment: Serial Number: [id number]:  [id number]       Magnesium [[id number]]  (Normal) Collected: 05/21/25 1203    Specimen: Blood Updated: 05/21/25 1233     Magnesium 1.7 mg/dL     Basic Metabolic Panel [[id number]]  (Abnormal) Collected:  05/21/25 1203    Specimen: Blood Updated: 05/21/25 1233     Glucose 160 mg/dL      BUN 17 mg/dL      Creatinine 2.06 mg/dL      Sodium 143 mmol/L      Potassium 3.7 mmol/L      Chloride 108 mmol/L      CO2 14.4 mmol/L      Calcium 7.3 mg/dL      BUN/Creatinine Ratio 8.3     Anion Gap 20.6 mmol/L      eGFR 28.4 mL/min/1.73     Narrative:      GFR Categories in Chronic Kidney Disease (CKD)              GFR Category          GFR (mL/min/1.73)    Interpretation  G1                    90 or greater        Normal or high (1)  G2                    60-89                Mild decrease (1)  G3a                   45-59                Mild to moderate decrease  G3b                   30-44                Moderate to severe decrease  G4                    15-29                Severe decrease  G5                    14 or less           Kidney failure    (1)In the absence of evidence of kidney disease, neither GFR category G1 or G2 fulfill the criteria for CKD.    eGFR calculation 2021 CKD-EPI creatinine equation, which does not include race as a factor    Phosphorus [[id number]]  (Abnormal) Collected: 05/21/25 1203    Specimen: Blood Updated: 05/21/25 1233     Phosphorus 2.3 mg/dL     Hemoglobin & Hematocrit, Blood [[id number]]  (Abnormal) Collected: 05/21/25 1203    Specimen: Blood Updated: 05/21/25 1216     Hemoglobin 8.3 g/dL      Comment: Result checked          Hematocrit 26.5 %     Haptoglobin [[id number]]  (Normal) Collected: 05/20/25 2003    Specimen: Blood Updated: 05/21/25 1105     Haptoglobin 127 mg/dL      Comment: Specimen hemolyzed. Results may be affected.       POC Glucose Once [[id number]]  (Abnormal) Collected: 05/21/25 1043    Specimen: Blood Updated: 05/21/25 1045     Glucose 199 mg/dL      Comment: Serial Number: [id number]:  [id number]       CK [[id number]]  (Abnormal) Collected: 05/21/25 0841    Specimen: Blood Updated: 05/21/25 0957     Creatine Kinase 273 U/L     POC Glucose Once [[id number]]  (Abnormal)  Collected: 05/21/25 0956    Specimen: Blood Updated: 05/21/25 0957     Glucose 211 mg/dL      Comment: Serial Number: [id number]:  [id number]       Magnesium [[id number]]  (Normal) Collected: 05/21/25 0841    Specimen: Blood Updated: 05/21/25 0913     Magnesium 1.7 mg/dL     Basic Metabolic Panel [[id number]]  (Abnormal) Collected: 05/21/25 0841    Specimen: Blood Updated: 05/21/25 0913     Glucose 202 mg/dL      BUN 17 mg/dL      Creatinine 2.05 mg/dL      Sodium 143 mmol/L      Potassium 3.4 mmol/L      Comment: Specimen hemolyzed.  Result may be falsely elevated.        Chloride 105 mmol/L      CO2 10.5 mmol/L      Calcium 7.3 mg/dL      BUN/Creatinine Ratio 8.3     Anion Gap 27.5 mmol/L      eGFR 28.5 mL/min/1.73     Narrative:      GFR Categories in Chronic Kidney Disease (CKD)              GFR Category          GFR (mL/min/1.73)    Interpretation  G1                    90 or greater        Normal or high (1)  G2                    60-89                Mild decrease (1)  G3a                   45-59                Mild to moderate decrease  G3b                   30-44                Moderate to severe decrease  G4                    15-29                Severe decrease  G5                    14 or less           Kidney failure    (1)In the absence of evidence of kidney disease, neither GFR category G1 or G2 fulfill the criteria for CKD.    eGFR calculation 2021 CKD-EPI creatinine equation, which does not include race as a factor    Ferritin [[id number]]  (Abnormal) Collected: 05/21/25 0452    Specimen: Blood Updated: 05/21/25 0909     Ferritin 558.00 ng/mL     Narrative:      Results may be falsely decreased if patient taking Biotin.      Phosphorus [[id number]]  (Normal) Collected: 05/21/25 0841    Specimen: Blood Updated: 05/21/25 0908     Phosphorus 2.5 mg/dL     Vancomycin, Random [[id number]]  (Normal) Collected: 05/21/25 0841    Specimen: Blood Updated: 05/21/25 0908     Vancomycin Random 10.40 mcg/mL      Narrative:      Therapeutic Ranges for Vancomycin    Vancomycin Random   5.0-40.0 mcg/mL  Vancomycin Trough   5.0-20.0 mcg/mL  Vancomycin Peak     20.0-40.0 mcg/mL    Iron Profile [[id number]]  (Abnormal) Collected: 05/21/25 0841    Specimen: Blood Updated: 05/21/25 0908     Iron 14 mcg/dL      Iron Saturation (TSAT) 23 %      Transferrin 40 mg/dL      TIBC 60 mcg/dL     Reticulocytes [[id number]]  (Normal) Collected: 05/21/25 0542    Specimen: Blood Updated: 05/21/25 0859     Reticulocyte % 1.25 %      Reticulocyte Absolute 0.0243 10*6/mm3     POC Glucose Once [[id number]]  (Abnormal) Collected: 05/21/25 0839    Specimen: Blood Updated: 05/21/25 0841     Glucose 203 mg/dL      Comment: Serial Number: [id number]:  [id number]       POC Glucose Once [[id number]]  (Abnormal) Collected: 05/21/25 0727    Specimen: Blood Updated: 05/21/25 0729     Glucose 222 mg/dL      Comment: Serial Number: [id number]:  [id number]       POC Glucose Once [[id number]]  (Abnormal) Collected: 05/21/25 0609    Specimen: Blood Updated: 05/21/25 0611     Glucose 227 mg/dL      Comment: Serial Number: [id number]:  [id number]       CBC & Differential [[id number]]  (Abnormal) Collected: 05/21/25 0542    Specimen: Blood Updated: 05/21/25 0555    Narrative:      The following orders were created for panel order CBC & Differential.  Procedure                               Abnormality         Status                     ---------                               -----------         ------                     CBC Auto Differential[[id number]]        Abnormal            Final result                 Please view results for these tests on the individual orders.    CBC Auto Differential [[id number]]  (Abnormal) Collected: 05/21/25 0542    Specimen: Blood Updated: 05/21/25 0555     WBC 12.92 10*3/mm3      RBC 1.99 10*6/mm3      Hemoglobin 5.7 g/dL      Hematocrit 18.6 %      MCV 93.5 fL      MCH 28.6 pg      MCHC 30.6 g/dL      RDW 15.2 %       RDW-SD 51.4 fl      MPV 11.6 fL      Platelets 183 10*3/mm3      Neutrophil % 80.0 %      Lymphocyte % 15.6 %      Monocyte % 2.9 %      Eosinophil % 0.0 %      Basophil % 0.1 %      Immature Grans % 1.4 %      Neutrophils, Absolute 10.35 10*3/mm3      Lymphocytes, Absolute 2.01 10*3/mm3      Monocytes, Absolute 0.37 10*3/mm3      Eosinophils, Absolute 0.00 10*3/mm3      Basophils, Absolute 0.01 10*3/mm3      Immature Grans, Absolute 0.18 10*3/mm3      nRBC 0.3 /100 WBC     Phosphorus [[id number]]  (Normal) Collected: 05/21/25 0452    Specimen: Blood Updated: 05/21/25 0532     Phosphorus 3.2 mg/dL     CBC & Differential [[id number]]  (Abnormal) Collected: 05/21/25 0520    Specimen: Blood Updated: 05/21/25 0531    Narrative:      The following orders were created for panel order CBC & Differential.  Procedure                               Abnormality         Status                     ---------                               -----------         ------                     CBC Auto Differential[[id number]]        Abnormal            Final result                 Please view results for these tests on the individual orders.    CBC Auto Differential [[id number]]  (Abnormal) Collected: 05/21/25 0520    Specimen: Blood Updated: 05/21/25 0531     WBC 13.31 10*3/mm3      RBC 2.04 10*6/mm3      Hemoglobin 5.8 g/dL      Comment: Result checked          Hematocrit 19.2 %      Comment: Result checked          MCV 94.1 fL      MCH 28.4 pg      MCHC 30.2 g/dL      RDW 15.4 %      RDW-SD 53.0 fl      MPV 11.5 fL      Platelets 192 10*3/mm3      Neutrophil % 79.6 %      Lymphocyte % 16.2 %      Monocyte % 3.1 %      Eosinophil % 0.0 %      Basophil % 0.1 %      Immature Grans % 1.0 %      Neutrophils, Absolute 10.60 10*3/mm3      Lymphocytes, Absolute 2.16 10*3/mm3      Monocytes, Absolute 0.41 10*3/mm3      Eosinophils, Absolute 0.00 10*3/mm3      Basophils, Absolute 0.01 10*3/mm3      Immature Grans, Absolute 0.13 10*3/mm3      nRBC 0.4  /100 WBC     Magnesium [[id number]]  (Normal) Collected: 05/21/25 0452    Specimen: Blood Updated: 05/21/25 0531     Magnesium 1.9 mg/dL     Comprehensive Metabolic Panel [[id number]]  (Abnormal) Collected: 05/21/25 0452    Specimen: Blood Updated: 05/21/25 0531     Glucose 230 mg/dL      BUN 17 mg/dL      Creatinine 2.04 mg/dL      Sodium 143 mmol/L      Potassium 3.7 mmol/L      Chloride 105 mmol/L      CO2 9.6 mmol/L      Calcium 7.4 mg/dL      Total Protein 4.6 g/dL      Albumin 3.0 g/dL      ALT (SGPT) 13 U/L      AST (SGOT) 29 U/L      Alkaline Phosphatase 235 U/L      Total Bilirubin 0.3 mg/dL      Globulin 1.6 gm/dL      A/G Ratio 1.9 g/dL      BUN/Creatinine Ratio 8.3     Anion Gap 28.4 mmol/L      eGFR 28.7 mL/min/1.73     Narrative:      GFR Categories in Chronic Kidney Disease (CKD)              GFR Category          GFR (mL/min/1.73)    Interpretation  G1                    90 or greater        Normal or high (1)  G2                    60-89                Mild decrease (1)  G3a                   45-59                Mild to moderate decrease  G3b                   30-44                Moderate to severe decrease  G4                    15-29                Severe decrease  G5                    14 or less           Kidney failure    (1)In the absence of evidence of kidney disease, neither GFR category G1 or G2 fulfill the criteria for CKD.    eGFR calculation 2021 CKD-EPI creatinine equation, which does not include race as a factor    Lipid Panel [[id number]]  (Abnormal) Collected: 05/21/25 0452    Specimen: Blood Updated: 05/21/25 0524     Total Cholesterol 74 mg/dL      Triglycerides 180 mg/dL      HDL Cholesterol 27 mg/dL      LDL Cholesterol  18 mg/dL      VLDL Cholesterol 29 mg/dL      LDL/HDL Ratio 0.41    Narrative:      Cholesterol Reference Ranges  (U.S. Department of Health and Human Services ATP III Classifications)    Desirable          <200 mg/dL  Borderline High    200-239 mg/dL  High Risk           >240 mg/dL      Triglyceride Reference Ranges  (U.S. Department of Health and Human Services ATP III Classifications)    Normal           <150 mg/dL  Borderline High  150-199 mg/dL  High             200-499 mg/dL  Very High        >500 mg/dL    HDL Reference Ranges  (U.S. Department of Health and Human Services ATP III Classifications)    Low     <40 mg/dl (major risk factor for CHD)  High    >60 mg/dl ('negative' risk factor for CHD)        LDL Reference Ranges  (U.S. Department of Health and Human Services ATP III Classifications)    Optimal          <100 mg/dL  Near Optimal     100-129 mg/dL  Borderline High  130-159 mg/dL  High             160-189 mg/dL  Very High        >189 mg/dL    LDL is calculated using the NIH LDL-C calculation.      POC Glucose Once [[id number]]  (Abnormal) Collected: 05/21/25 0450    Specimen: Blood Updated: 05/21/25 0452     Glucose 248 mg/dL      Comment: Serial Number: [id number]:  [id number]       POC Glucose Once [[id number]]  (Abnormal) Collected: 05/21/25 0340    Specimen: Blood Updated: 05/21/25 0342     Glucose 251 mg/dL      Comment: Serial Number: [id number]:  [id number]       POC Glucose Once [[id number]]  (Abnormal) Collected: 05/21/25 0237    Specimen: Blood Updated: 05/21/25 0239     Glucose 255 mg/dL      Comment: Serial Number: [id number]:  [id number]       Calcium, Ionized [[id number]]  (Abnormal) Collected: 05/21/25 0053    Specimen: Blood Updated: 05/21/25 0221     Ionized Calcium 1.12 mmol/L     POC Glucose Once [[id number]]  (Abnormal) Collected: 05/21/25 0134    Specimen: Blood Updated: 05/21/25 0136     Glucose 228 mg/dL      Comment: Serial Number: [id number]:  [id number]       Phosphorus [[id number]]  (Normal) Collected: 05/21/25 0053    Specimen: Blood Updated: 05/21/25 0129     Phosphorus 4.5 mg/dL     Basic Metabolic Panel [[id number]]  (Abnormal) Collected: 05/21/25 0053    Specimen: Blood Updated: 05/21/25 0128     Glucose 245 mg/dL       BUN 17 mg/dL      Creatinine 1.97 mg/dL      Sodium 145 mmol/L      Potassium 3.8 mmol/L      Chloride 106 mmol/L      CO2 6.0 mmol/L      Calcium 7.2 mg/dL      BUN/Creatinine Ratio 8.6     Anion Gap 33.0 mmol/L      eGFR 29.9 mL/min/1.73     Narrative:      GFR Categories in Chronic Kidney Disease (CKD)              GFR Category          GFR (mL/min/1.73)    Interpretation  G1                    90 or greater        Normal or high (1)  G2                    60-89                Mild decrease (1)  G3a                   45-59                Mild to moderate decrease  G3b                   30-44                Moderate to severe decrease  G4                    15-29                Severe decrease  G5                    14 or less           Kidney failure    (1)In the absence of evidence of kidney disease, neither GFR category G1 or G2 fulfill the criteria for CKD.    eGFR calculation 2021 CKD-EPI creatinine equation, which does not include race as a factor    Magnesium [[id number]]  (Normal) Collected: 05/21/25 0053    Specimen: Blood Updated: 05/21/25 0128     Magnesium 1.9 mg/dL     MRSA Screen, PCR (Inpatient) - Swab, Nares [[id number]]  (Normal) Collected: 05/20/25 2357    Specimen: Swab from Nares Updated: 05/21/25 0113     MRSA PCR No MRSA Detected    Narrative:      The negative predictive value of this diagnostic test is high and should only be used to consider de-escalating anti-MRSA therapy. A positive result may indicate colonization with MRSA and must be correlated clinically.    Respiratory Panel PCR w/COVID-19(SARS-CoV-2) [facility] In-House, NP Swab in UTM/VTM, 2 HR TAT - Swab, Nasopharynx [[id number]]  (Normal) Collected: 05/20/25 2357    Specimen: Swab from Nasopharynx Updated: 05/21/25 0048     ADENOVIRUS, PCR Not Detected     Coronavirus 229E Not Detected     Coronavirus HKU1 Not Detected     Coronavirus NL63 Not Detected     Coronavirus OC43 Not Detected     COVID19 Not Detected      Human Metapneumovirus Not Detected     Human Rhinovirus/Enterovirus Not Detected     Influenza A PCR Not Detected     Influenza B PCR Not Detected     Parainfluenza Virus 1 Not Detected     Parainfluenza Virus 2 Not Detected     Parainfluenza Virus 3 Not Detected     Parainfluenza Virus 4 Not Detected     RSV, PCR Not Detected     Bordetella pertussis pcr Not Detected     Bordetella parapertussis PCR Not Detected     Chlamydophila pneumoniae PCR Not Detected     Mycoplasma pneumo by PCR Not Detected    Narrative:      In the setting of a positive respiratory panel with a viral infection PLUS a negative procalcitonin without other underlying concern for bacterial infection, consider observing off antibiotics or discontinuation of antibiotics and continue supportive care. If the respiratory panel is positive for atypical bacterial infection (Bordetella pertussis, Chlamydophila pneumoniae, or Mycoplasma pneumoniae), consider antibiotic de-escalation to target atypical bacterial infection.    POC Glucose Once [[id number]]  (Abnormal) Collected: 05/21/25 0024    Specimen: Blood Updated: 05/21/25 0025     Glucose 252 mg/dL      Comment: Serial Number: [id number]:  [id number]       Blood Gas, Venous - [[id number]]  (Abnormal) Collected: 05/20/25 2334    Specimen: Venous Blood from Cannula Updated: 05/20/25 2338     Site PICC     pH, Venous 7.107 pH Units      pCO2, Venous 14.5 mm Hg      pO2, Venous 44.1 mm Hg      HCO3, Venous 4.6 mmol/L      Base Excess, Venous -22.9 mmol/L      Comment: Serial Number: [id number]:  [id number]        O2 Saturation, Venous 65.2 %      CO2 Content 5.0 mmol/L      Barometric Pressure for Blood Gas --     Comment: N/A        Modality Room Air     FIO2 21 %     POC Glucose Once [[id number]]  (Abnormal) Collected: 05/20/25 2308    Specimen: Blood Updated: 05/20/25 2310     Glucose 301 mg/dL      Comment: Serial Number: [id number]:  [id number]       POC Glucose Once [[id number]]   (Abnormal) Collected: 05/20/25 2205    Specimen: Blood Updated: 05/20/25 2207     Glucose 335 mg/dL      Comment: Serial Number: [id number]:  [id number]       Beta Hydroxybutyrate Quantitative [[id number]] Collected: 05/20/25 2003    Specimen: Blood Updated: 05/20/25 2115    Hemoglobin A1c [[id number]]  (Abnormal) Collected: 05/20/25 1820    Specimen: Blood Updated: 05/20/25 2110     Hemoglobin A1C 10.30 %     Narrative:      Hemoglobin A1C Ranges:    Increased Risk for Diabetes  5.7% to 6.4%  Diabetes                     >= 6.5%  Diabetic Goal                < 7.0%    POC Glucose Once [[id number]]  (Abnormal) Collected: 05/20/25 2100    Specimen: Blood Updated: 05/20/25 2102     Glucose 346 mg/dL      Comment: Serial Number: [id number]:  [id number]       Basic Metabolic Panel [[id number]]  (Abnormal) Collected: 05/20/25 2003    Specimen: Blood Updated: 05/20/25 2034     Glucose 377 mg/dL      BUN 17 mg/dL      Creatinine 2.12 mg/dL      Sodium 141 mmol/L      Potassium 4.7 mmol/L      Chloride 102 mmol/L      CO2 3.1 mmol/L      Calcium 7.7 mg/dL      BUN/Creatinine Ratio 8.0     Anion Gap 35.9 mmol/L      eGFR 27.4 mL/min/1.73     Narrative:      GFR Categories in Chronic Kidney Disease (CKD)              GFR Category          GFR (mL/min/1.73)    Interpretation  G1                    90 or greater        Normal or high (1)  G2                    60-89                Mild decrease (1)  G3a                   45-59                Mild to moderate decrease  G3b                   30-44                Moderate to severe decrease  G4                    15-29                Severe decrease  G5                    14 or less           Kidney failure    (1)In the absence of evidence of kidney disease, neither GFR category G1 or G2 fulfill the criteria for CKD.    eGFR calculation 2021 CKD-EPI creatinine equation, which does not include race as a factor    Magnesium [[id number]]  (Normal) Collected: 05/20/25 2003     Specimen: Blood Updated: 05/20/25 2034     Magnesium 2.2 mg/dL     High Sensitivity Troponin T 1Hr [[id number]]  (Abnormal) Collected: 05/20/25 2003    Specimen: Blood Updated: 05/20/25 2034     HS Troponin T 88 ng/L      Troponin T Numeric Delta -7 ng/L      Troponin T % Delta -7    Narrative:      High Sensitive Troponin T Reference Range:  <14.0 ng/L- Negative Female for AMI  <22.0 ng/L- Negative Male for AMI  >=14 - Abnormal Female indicating possible myocardial injury.  >=22 - Abnormal Male indicating possible myocardial injury.   Clinicians would have to utilize clinical acumen, EKG, Troponin, and serial changes to determine if it is an Acute Myocardial Infarction or myocardial injury due to an underlying chronic condition.         Phosphorus [[id number]]  (Abnormal) Collected: 05/20/25 2003    Specimen: Blood Updated: 05/20/25 2029     Phosphorus 6.2 mg/dL     POC Glucose Once [[id number]]  (Abnormal) Collected: 05/20/25 2011    Specimen: Blood Updated: 05/20/25 2012     Glucose 383 mg/dL      Comment: Serial Number: [id number]:  [id number]       Phosphorus [[id number]]  (Abnormal) Collected: 05/20/25 1820    Specimen: Blood Updated: 05/20/25 1941     Phosphorus 6.6 mg/dL     Ketone Bodies, Serum (Not performed at [place]) [[id number]]  (Abnormal) Collected: 05/20/25 1820    Specimen: Blood Updated: 05/20/25 1933    Narrative:      The following orders were created for panel order Ketone Bodies, Serum (Not performed at [place]).  Procedure                               Abnormality         Status                     ---------                               -----------         ------                     Acetone[[id number]]                      Abnormal            Final result                 Please view results for these tests on the individual orders.    Acetone [[id number]]  (Abnormal) Collected: 05/20/25 1820    Specimen: Blood Updated: 05/20/25 1933     Acetone Moderate    Osmolality, Serum [[id number]]   (Abnormal) Collected: 05/20/25 1818    Specimen: Blood Updated: 05/20/25 1932     Osmolality 333 mOsm/kg     Blood Gas, Arterial - [[id number]]  (Abnormal) Collected: 05/20/25 1908    Specimen: Arterial Blood Updated: 05/20/25 1913     Site Right Radial     Curtis's Test Positive     pH, Arterial 6.988 pH units      pCO2, Arterial 7.5 mm Hg      pO2, Arterial 120.6 mm Hg      HCO3, Arterial 1.8 mmol/L      Base Excess, Arterial -27.5 mmol/L      Comment: Serial Number: [id number]:  [id number]        O2 Saturation, Arterial 96.0 %      CO2 Content <5.0 mmol/L      Barometric Pressure for Blood Gas --     Comment: N/A        Modality Room Air     FIO2 21 %      Notified Who --     Read Back --     Notified Time --     Hemodilution No     PO2/FIO2 574    High Sensitivity Troponin T [[id number]]  (Abnormal) Collected: 05/20/25 1820    Specimen: Blood Updated: 05/20/25 1911     HS Troponin T 95 ng/L     Narrative:      High Sensitive Troponin T Reference Range:  <14.0 ng/L- Negative Female for AMI  <22.0 ng/L- Negative Male for AMI  >=14 - Abnormal Female indicating possible myocardial injury.  >=22 - Abnormal Male indicating possible myocardial injury.   Clinicians would have to utilize clinical acumen, EKG, Troponin, and serial changes to determine if it is an Acute Myocardial Infarction or myocardial injury due to an underlying chronic condition.         Comprehensive Metabolic Panel [[id number]]  (Abnormal) Collected: 05/20/25 1820    Specimen: Blood Updated: 05/20/25 1911     Glucose 397 mg/dL      BUN 18 mg/dL      Creatinine 2.27 mg/dL      Sodium 138 mmol/L      Potassium 4.8 mmol/L      Chloride 98 mmol/L      CO2 3.4 mmol/L      Calcium 8.2 mg/dL      Total Protein 5.1 g/dL      Albumin 2.2 g/dL      ALT (SGPT) 18 U/L      AST (SGOT) 23 U/L      Alkaline Phosphatase 359 U/L      Total Bilirubin 0.2 mg/dL      Globulin 2.9 gm/dL      A/G Ratio 0.8 g/dL      BUN/Creatinine Ratio 7.9     Anion Gap 36.6 mmol/L      eGFR  "25.2 mL/min/1.73     Narrative:      GFR Categories in Chronic Kidney Disease (CKD)              GFR Category          GFR (mL/min/1.73)    Interpretation  G1                    90 or greater        Normal or high (1)  G2                    60-89                Mild decrease (1)  G3a                   45-59                Mild to moderate decrease  G3b                   30-44                Moderate to severe decrease  G4                    15-29                Severe decrease  G5                    14 or less           Kidney failure    (1)In the absence of evidence of kidney disease, neither GFR category G1 or G2 fulfill the criteria for CKD.    eGFR calculation 2021 CKD-EPI creatinine equation, which does not include race as a factor    Magnesium [[id number]]  (Normal) Collected: 05/20/25 1820    Specimen: Blood Updated: 05/20/25 1911     Magnesium 2.3 mg/dL     Lipase [[id number]]  (Normal) Collected: 05/20/25 1820    Specimen: Blood Updated: 05/20/25 1904     Lipase 21 U/L     Procalcitonin [[id number]]  (Abnormal) Collected: 05/20/25 1820    Specimen: Blood Updated: 05/20/25 1904     Procalcitonin 1.17 ng/mL     Narrative:      As a Marker for Sepsis (Non-Neonates):    1. <0.5 ng/mL represents a low risk of severe sepsis and/or septic shock.  2. >2 ng/mL represents a high risk of severe sepsis and/or septic shock.    As a Marker for Lower Respiratory Tract Infections that require antibiotic therapy:    PCT on Admission    Antibiotic Therapy       6-12 Hrs later    >0.5                Strongly Recommended  >0.25 - <0.5        Recommended   0.1 - 0.25          Discouraged              Remeasure/reassess PCT  <0.1                Strongly Discouraged     Remeasure/reassess PCT    As 28 day mortality risk marker: \"Change in Procalcitonin Result\" (>80% or <=80%) if Day 0 (or Day 1) and Day 4 values are available. Refer to http://www.[employer]-pct-calculator.com    Change in PCT <=80%  A decrease of PCT levels below or " equal to 80% defines a positive change in PCT test result representing a higher risk for 28-day all-cause mortality of patients diagnosed with severe sepsis for septic shock.    Change in PCT >80%  A decrease of PCT levels of more than 80% defines a negative change in PCT result representing a lower risk for 28-day all-cause mortality of patients diagnosed with severe sepsis or septic shock.       Ethanol [[id number]] Collected: 05/20/25 1820    Specimen: Blood Updated: 05/20/25 1904     Ethanol % <0.010 %     Narrative:      Plasma Ethanol Clinical Symptoms:    ETOH (%)               Clinical Symptom  .01-.05              No apparent influence  .03-.12              Euphoria, Diminished judgment and attention   .09-.25              Impaired comprehension, Muscle incoordination  .18-.30              Confusion, Staggered gait, Slurred speech  .25-.40              Markedly decreased response to stimuli, unable to stand or                        walk, vomitting, sleep or stupor  .35-.50              Comatose, Anesthesia, Subnormal body temperature        TSH Rfx On Abnormal To Free T4 [[id number]]  (Normal) Collected: 05/20/25 1820    Specimen: Blood Updated: 05/20/25 1904     TSH 2.890 uIU/mL     CK [[id number]]  (Abnormal) Collected: 05/20/25 1820    Specimen: Blood Updated: 05/20/25 1904     Creatine Kinase 269 U/L     [facility] Draw [[id number]] Collected: 05/20/25 1818    Specimen: Blood Updated: 05/20/25 1901    Narrative:      The following orders were created for panel order [facility] Draw.  Procedure                               Abnormality         Status                     ---------                               -----------         ------                     Green Top (Gel)[[id number]]                                                             Lavender Top[[id number]]                                                                Gold Top - SST[[id number]]                                   Final result                Light Blue Top[[id number]]                                                                Please view results for these tests on the individual orders.    Urine Drug Screen - Urine, Clean Catch [[id number]]  (Abnormal) Collected: 05/20/25 1839    Specimen: Urine, Clean Catch Updated: 05/20/25 1900     THC, Screen, Urine Negative     Phencyclidine (PCP), Urine Negative     Cocaine Screen, Urine Negative     Methamphetamine, Ur Negative     Opiate Screen Negative     Amphetamine Screen, Urine Negative     Benzodiazepine Screen, Urine Negative     Tricyclic Antidepressants Screen Negative     Methadone Screen, Urine Negative     Barbiturates Screen, Urine Negative     Oxycodone Screen, Urine Positive     Buprenorphine, Screen, Urine Negative    Narrative:      Cutoff For Drugs Screened:    Amphetamines               500 ng/ml  Barbiturates               200 ng/ml  Benzodiazepines            150 ng/ml  Cocaine                    150 ng/ml  Methadone                  200 ng/ml  Opiates                    100 ng/ml  Phencyclidine               25 ng/ml  THC                         50 ng/ml  Methamphetamine            500 ng/ml  Tricyclic Antidepressants  300 ng/ml  Oxycodone                  100 ng/ml  Buprenorphine               10 ng/ml    The normal value for all drugs tested is negative. This report includes unconfirmed screening results, with the cutoff values listed, to be used for medical treatment purposes only.  Unconfirmed results must not be used for non-medical purposes such as employment or legal testing.  Clinical consideration should be applied to any drug of abuse test, particularly when unconfirmed results are used.    All urine drugs of abuse requests without chain of custody are for medical screening purposes only.  False positives are possible.      Ammonia [[id number]]  (Normal) Collected: 05/20/25 1820    Specimen: Blood Updated: 05/20/25 1849     Ammonia 44 umol/L     Urinalysis With Culture If  Indicated - Straight Cath [[id number]]  (Abnormal) Collected: 05/20/25 1836    Specimen: Urine from Straight Cath Updated: 05/20/25 1847     Color, UA Yellow     Appearance, UA Clear     pH, UA 5.5     Specific Gravity, UA 1.015     Glucose,  mg/dL (Trace)     Ketones, UA 15 mg/dL (1+)     Bilirubin, UA Negative     Blood, UA Negative     Protein, UA Trace     Leuk Esterase, UA Negative     Nitrite, UA Negative     Urobilinogen, UA 0.2 E.U./dL    Narrative:      In absence of clinical symptoms, the presence of pyuria, bacteria, and/or nitrites on the urinalysis result does not correlate with infection.  Urine microscopic not indicated.    Carbon Monoxide, Blood [[id number]]  (Normal) Collected: 05/20/25 1840    Specimen: Blood Updated: 05/20/25 1847     Carbon Monoxide, Blood 2.6 %     aPTT [[id number]]  (Normal) Collected: 05/20/25 1820    Specimen: Blood Updated: 05/20/25 1841     PTT 28.0 seconds     Protime-INR [[id number]]  (Abnormal) Collected: 05/20/25 1820    Specimen: Blood Updated: 05/20/25 1841     Protime 15.4 Seconds      INR 1.22    CBC & Differential [[id number]]  (Abnormal) Collected: 05/20/25 1820    Specimen: Blood Updated: 05/20/25 1833    Narrative:      The following orders were created for panel order CBC & Differential.  Procedure                               Abnormality         Status                     ---------                               -----------         ------                     CBC Auto Differential[[id number]]        Abnormal            Final result                 Please view results for these tests on the individual orders.    CBC Auto Differential [[id number]]  (Abnormal) Collected: 05/20/25 1820    Specimen: Blood Updated: 05/20/25 1833     WBC 22.38 10*3/mm3      RBC 3.23 10*6/mm3      Hemoglobin 9.1 g/dL      Hematocrit 32.9 %      .9 fL      MCH 28.2 pg      MCHC 27.7 g/dL      RDW 15.7 %      RDW-SD 58.8 fl      MPV 12.0 fL      Platelets 364 10*3/mm3       Neutrophil % 86.3 %      Lymphocyte % 8.3 %      Monocyte % 2.6 %      Eosinophil % 0.1 %      Basophil % 0.1 %      Immature Grans % 2.6 %      Neutrophils, Absolute 19.30 10*3/mm3      Lymphocytes, Absolute 1.85 10*3/mm3      Monocytes, Absolute 0.58 10*3/mm3      Eosinophils, Absolute 0.03 10*3/mm3      Basophils, Absolute 0.03 10*3/mm3      Immature Grans, Absolute 0.59 10*3/mm3      nRBC 0.6 /100 WBC     Gold Top - SST [[id number]] Collected: 05/20/25 1818    Specimen: Blood Updated: 05/20/25 1831     Extra Tube Hold for add-ons.     Comment: Auto resulted.       Blood Culture - Blood, Wrist, Right [[id number]] Collected: 05/20/25 1826    Specimen: Blood from Wrist, Right Updated: 05/20/25 1828    Blood Culture - Blood, Arm, Left [[id number]] Collected: 05/20/25 1820    Specimen: Blood from Arm, Left Updated: 05/20/25 1825    POC Lactate [[id number]]  (Normal) Collected: 05/20/25 1812    Specimen: Blood Updated: 05/20/25 1814     Lactate 1.4 mmol/L      Comment: Serial Number: [id number]:  [id number]       POC Glucose Once [[id number]]  (Abnormal) Collected: 05/20/25 1803    Specimen: Blood Updated: 05/20/25 1805     Glucose 388 mg/dL      Comment: Serial Number: [id number]:  [id number]               XR Chest 1 View  Result Date: 5/21/2025  Impression: 1.New right internal jugular central venous catheter terminates in the upper right atrium. 2.Bibasilar atelectasis versus pneumonia. Electronically Signed: Clemente Dorman MD  5/21/2025 12:10 AM EDT  Workstation ID: [id number]    CT Abdomen Pelvis Without Contrast  Result Date: 5/20/2025  Impression: 1. Coronary calcification. 2. Patchy airspace opacity in the lung bases possibly secondary to atelectasis or pneumonia. 3. Generalized anasarca. Small amount of abdominal and pelvic free fluid. 4. Punctate bilateral nonobstructing renal calcifications. 5. There appear to be areas of wall thickening throughout the large and small bowel possibly secondary to  "enterocolitis. Ischemia is felt to be less likely given the diffuse appearing nature. Electronically Signed: Markos Holland MD  5/20/2025 9:57 PM EDT  Workstation ID: [id number]    CT Head Without Contrast  Result Date: 5/20/2025  Impression: New 5 cm x 2 cm area of low-attenuation in the left PCA distribution suspicious for an area of infarction. No evidence of acute hemorrhage or midline shift. Electronically Signed: Markos Holland MD  5/20/2025 8:48 PM EDT  Workstation ID: [id number]    XR Chest 1 View  Result Date: 5/20/2025  Mild bibasal infiltrates. Electronically Signed: Markos Holland MD  5/20/2025 7:38 PM EDT  Workstation ID: [id number]      CONSULTS   Consults       Date and Time Order Name Status Description    5/20/2025  8:54 PM Inpatient Neurology Consult Stroke Completed             CONDITION ON DISCHARGE    Stable    VITAL SIGNS  /84   Pulse 95   Temp 97.3 °F (36.3 °C)   Resp 22   Ht 162.6 cm (64\")   Wt 54.2 kg (119 lb 7.8 oz)   SpO2 93%   BMI 20.51 kg/m²     PHYSICAL EXAM  GEN:  Middle-aged woman who appears disheveled, acute-on-chronically ill, and much older than stated age.  Sitting up in bed on NC.  NAD.  NEURO:  Brainstem reflexes intact.  No obvious focal deficit.  Moves all 4 ext.  HEENT:  N/AT.  PERRL. Oropharynx non-erythematous.  No drainage from the eyes/ears/nose.  No conjunctival petechiae.  No oral thrush.  Auditory and visual acuity grossly wnl.  Poor dentition.  Voice normal.  NECK:  Supple, NT, trachea midline.  No meningismus.  No ROM limitation.    CHEST/LUNGS:  Breath sounds are clear and equal bilaterally.  No w/r/r.  Chest excursion equal bilaterally.    CARDIOVASCULAR:  RRR w/o murmur noted.    GI:  Abdomen soft, NT, ND, +BS.   :  No Fields cath in place.  EXTREMITIES:  No deformity or amputation.  No cyanosis, edema, or asymmetry.  Pulses 2+ and equal in BLE's.    SKIN:  Warm, dry, and pink.  No rash.  Multiple scabs on skin.  LYMPHATICS/HEME:  No overt LAD or abnormal " "bruising.  No lymphedema.  MSK:  Normal ROM.  No joint abnormalities noted.  Strength is 5/5 and equal in BUE and BLE's.  PSYCH:  A&Ox 3.  Normal mood and flat affect.  Responds appropriately to commands and appears to comprehend instructions.        DISCHARGE DISPOSITION  Hospice/Medical Facility ([facility])    DISCHARGE MEDICATIONS  Inter-facility transfer medications (From admission, onward)               diazePAM (VALIUM) injection 5 mg  ( IP MEDS DIAZEPAM IV/IM AND LORAZEPAM PO/SL LINKED (OR) PANEL)  Every 1 Hour PRN             scopolamine patch 1 mg/72 hr  Every 72 Hours PRN             atropine 1 % ophthalmic solution 2 drop  2 Times Daily PRN             glycopyrrolate (ROBINUL) injection 0.4 mg  (glycopyrrolate (ROBINUL) IV/SQ )  Every 2 Hours PRN             diphenoxylate-atropine (LOMOTIL) 2.5-0.025 MG per tablet 1 tablet  Every 2 Hours PRN             Polyvinyl Alcohol-Povidone PF (ARTIFICIAL TEARS) 1.4-0.6 % ophthalmic solution 1 drop  Every 30 Minutes PRN             acetaminophen (TYLENOL) tablet 650 mg  (acetaminophen (TYLENOL) PO/DC)  Every 4 Hours PRN        Placed in \"Or\" Linked Group        acetaminophen (TYLENOL) 160 MG/5ML oral solution 650 mg  (acetaminophen (TYLENOL) PO/DC)  Every 4 Hours PRN        Placed in \"Or\" Linked Group        acetaminophen (TYLENOL) suppository 650 mg  (acetaminophen (TYLENOL) PO/DC)  Every 4 Hours PRN        Placed in \"Or\" Linked Group        HYDROmorphone (DILAUDID) injection 1 mg  (hydromorphone (DILAUDID) IV or SQ)  Every 1 Hour PRN                              DISCHARGE DIET   NPO    ACTIVITY AT DISCHARGE   Bedrest      FOLLOW-UP APPOINTMENTS  No future appointments.        TEST RESULTS PENDING AT DISCHARGE  Pending Results       Procedure [Order ID] Specimen - Date/Time    Blood Culture - Blood, Arm, Left [[id number]] Collected: 05/20/25 1820    Specimen: Blood from Arm, Left Updated: 05/20/25 1825    Blood Culture - Blood, Wrist, Right " [[id number]] Collected: 05/20/25 1826    Specimen: Blood from Wrist, Right Updated: 05/20/25 1828    Vitamin B12 [[id number]] Collected: 05/20/25 1818    Specimen: Blood Updated: 05/21/25 1415              This note completed by this nurse practitioner on behalf of the critical care team, at the request of Dr. Lester, who agrees with transition in this new plan of care.    Time: Discharge 32 min    Bonifacio Benjamin, APRN  5/21/2025      Electronically signed by Alexus Lester DO at 05/21/25 1535       Discharge Order (From admission, onward)       Start     Ordered    05/21/25 1524  Discharge readmit patient  Once        Expected Discharge Date: 05/21/25   Discharge Disposition: Hospice/Medical Facility ([facility])   Physician of Record for Attribution - Please select from Treatment Team: ALEXUS LESTER [[id number]]   Review needed by CMO to determine Physician of Record: No      Question Answer Comment   Physician of Record for Attribution - Please select from Treatment Team ALEXUS LESTER    Review needed by CMO to determine Physician of Record No        05/21/25 1521

## 2025-05-22 NOTE — PLAN OF CARE
Goal Outcome Evaluation:  Plan of Care Reviewed With: patient        Progress: no change  Outcome Evaluation: Patient was a downgrade from ICU. DNR/DNI with [facility] following. A&O to self and place. Has F/C in place. On RA. Refusing turns, VS, blood sugars. On a diabetic diet, but patient not wanting to eat. Very flat affect. Weak pulses. Clear lungs. No other issues at this time. Continuing to monitor.

## 2025-05-23 NOTE — PLAN OF CARE
Goal Outcome Evaluation:   POC Reviewed with patient, no evidence of learning due to underlying cognitive status. Per CM Note patient will dc to [facility]. Labs discontinued and diet remains cc but pt refusing oral intake. Comfort medications remain available to patient, no s/s of active distress. Per Hospitalist note Gladis Snyder, who can be contacted at [phone], is the designated person to make medical decisions on the patient's behalf if She is incapable of doing so. This was clarified with patient and/or next of kin on 5/21/2025 during the course of this H&P.

## 2025-05-23 NOTE — CASE MANAGEMENT/SOCIAL WORK
Continued Stay Note  ROSAURA Heaton     Patient Name: Sheryl Stuart  MRN: [id number]  Today's Date: 5/23/2025    Admit Date: 5/21/2025    Plan: GIP with [facility].   Discharge Plan       Row Name 05/23/25 1545       Plan    Plan Comments Pt accepted medications during the night. O2 sats and BP's remain stable.             Megan Naegele, RN     Office Phone: [phone]  Office Cell: [phone]

## 2025-05-23 NOTE — PLAN OF CARE
Goal Outcome Evaluation:                 Comfort care. No new complaints or changes.

## 2025-05-23 NOTE — PROGRESS NOTES
[facility] MEDICINE SERVICE  DAILY PROGRESS NOTE    NAME: Sheryl Stuart  : 1970  MRN: [id number]      LOS: 2 days     PROVIDER OF SERVICE: Samanta Goss MD    Chief Complaint: End of life care    Subjective:   H&P, consults, labs and imaging reviewed  Interval History:    Resting comfortably and not in acute distress    Review of Systems:   Unable to obtain    Objective:     Vital Signs  Heart Rate:  [75] 75   There is no height or weight on file to calculate BMI.    Physical Exam   Patient is resting comfortably and not in acute distress.      Scheduled Meds       PRN Meds     acetaminophen **OR** acetaminophen **OR** acetaminophen    atropine    diazePAM    diphenoxylate-atropine    glycopyrrolate    HYDROmorphone    ondansetron    Polyvinyl Alcohol-Povidone PF    Scopolamine   Infusions         Diagnostic Data    Results from last 7 days   Lab Units 25  1203 25  0841 25  0542 25  0520 25  0452   WBC 10*3/mm3  --   --  12.92*   < >  --    HEMOGLOBIN g/dL 8.3*  --  5.7*   < >  --    HEMATOCRIT % 26.5*  --  18.6*   < >  --    PLATELETS 10*3/mm3  --   --  183   < >  --    GLUCOSE mg/dL 160*   < >  --   --  230*   CREATININE mg/dL 2.06*   < >  --   --  2.04*   BUN mg/dL 17   < >  --   --  17   SODIUM mmol/L 143   < >  --   --  143   POTASSIUM mmol/L 3.7   < >  --   --  3.7   AST (SGOT) U/L  --   --   --   --  29   ALT (SGPT) U/L  --   --   --   --  13   ALK PHOS U/L  --   --   --   --  235*   BILIRUBIN mg/dL  --   --   --   --  0.3   ANION GAP mmol/L 20.6*   < >  --   --  28.4*    < > = values in this interval not displayed.       No radiology results for the last day      Interval results reviewed.    Assessment/Plan:   End of life care  DKA/type 1 diabetes mellitus  Non-STEMI  Sepsis  Metabolic acidosis with high anion gap  Elevated LFTs  Anemia  Hypertension  GERD  COPD  Chronic pain    Under GIP hospice with [facility]  Continue comfort care          Treatment plan discussed with RN.     VTE Prophylaxis:  No VTE prophylaxis order currently exists.         Code status is   Code Status and Medical Interventions: No CPR (Do Not Attempt to Resuscitate); Comfort Measures   Ordered at: 05/21/25 1704     Code Status (Patient has no pulse and is not breathing):    No CPR (Do Not Attempt to Resuscitate)     Medical Interventions (Patient has pulse or is breathing):    Comfort Measures     Level Of Support Discussed With:    Next of Kin (If No Surrogate)       Plan for disposition:     Barriers to Discharge: Hospice care  Anticipated Date of Discharge: Unknown  Place of Discharge: Unknown      Time: 40 minutes     Signature: Electronically signed by Samanta Goss MD, 05/23/25, 13:06 EDT.  [place] Hospitalist Team

## 2025-05-23 NOTE — NURSING NOTE
Pt removed IJ and left PIV was infiltrated and removed. Pt did not bleed after self removal of IJ. Pressure still held and dressing placed.

## 2025-05-24 NOTE — PLAN OF CARE
Goal Outcome Evaluation:         Plan of care ongoing. Pt is comfort care.

## 2025-05-24 NOTE — NURSING NOTE
Contacted the daughter Gladis Snyder and informed her pt had passed away. S/O Ventura was also called but no answer.

## 2025-05-24 NOTE — DISCHARGE SUMMARY
"Date and Time of Death: 2025 at 0107                 [facility] Medicine Services  Discharge Summary    Date of Service: 2025  Patient Name: Sheryl Stuart  : 1970  MRN: [id number]    Date of Admission: 2025  Discharge Diagnosis: End of life care  Date of Discharge: 2025  Primary Care Physician: Berenice London MD      Presenting Problem:   End of life care [Z51.5]    Active and Resolved Hospital Problems:  Active Hospital Problems    Diagnosis POA    **End of life care [Z51.5] Not Applicable      Resolved Hospital Problems   No resolved problems to display.         Hospital Course     HPI:    \" Sheryl Stuart is a 54 y.o. female with a CMH of type 1 diabetes mellitus, non-STEMI, hypertension, GERD, chronic pain, COPD, and anxiety presented to the hospital for confusion. Per ICU provider hospital course on discharge summary, \"family stated that the patient had been in bed and not eating or drinking x4 days. In the ED the patient was able to state her name only and follow some simple commands. Labs remarkable for ABG 6.988/7.5/120/1.8, , troponin 95, repeat troponin 88, anion gap 36.6, creatinine 2.27, glucose 397, calcium 8.2, phosphorus 6.6, alkaline phosphatase 359, albumin 2.2, hemoglobin A1c 10.3, osmolality 333, procalcitonin 1.17, PT 15.4, INR 1.22, WBC 22.38, hemoglobin 9.1.  UA showed trace amount of glucose, ketonuria, trace proteinuria.  UDS was positive for oxycodone which the patient is prescribed.  She received 2 A of bicarb, a full sepsis bolus, 1 additional liter IVF, cefepime, vancomycin, and was started on norepinephrine for persistent hypotension.  CT head showed a new 5 cm x 2 cm area of low-attenuation in the left PCA distribution suspicious for an area of infarction. No evidence of acute hemorrhage or midline shift. Her last known well time was days ago. Neurology consulted. CT abdomen/pelvis pending. CXR no infiltrates or effusions.      She was " "admitted to the ICU with a principal diagnosis of DKA (diabetic ketoacidosis). On 5/21, patient afebrile, VSS.  A&Ox4.  Has only had 700 mL of UOP since admission, net +4.6 L.  Cr at baseline.  CO2 up to 14, AG remains 21.  Hb 5.7--transfused and came up to 8.  Pt has elected that she does not wish to continue any further treatment and has asked for hospice.  Pt's daughter agreed with this when RN spoke with her on the phone.  Will likely go home with hospice.  Continue current treatment measures for now; will not escalate care.  Patient did not pass her bedside swallow evaluation, and when given option to proceed with a video swallow, decided against proceeding with this test.  She stated that she would prefer to go comfort measures.  She was seen and evaluated by neurology.     Patient was accepted for transition to [facility] with [facility].  Patient will be readmitted to the hospitalist team.  Comfort orders in place\".     Patient was seen and evaluated at the bedside, no family present. Patient appears comfortable at this time. Patient did not arouse to voice, resting comfortably. \"    Hospital Course:  The patient, a 54-year-old female with a history of type 1 diabetes, NSTEMI, hypertension, GERD, chronic pain, COPD, and anxiety, presented with altered mental status after reportedly being bedbound and not eating or drinking for four days. On arrival, she was minimally responsive with significant metabolic derangements consistent with diabetic ketoacidosis (DKA), acute kidney injury, and elevated troponin. She was admitted to the ICU, treated with IV fluids, bicarbonate, vasopressors, and broad-spectrum antibiotics for presumed sepsis. Imaging revealed a new left PCA territory infarct without hemorrhage. Despite initial stabilization, her clinical status remained poor. After discussion, the patient elected to transition to comfort-focused care and was accepted to [facility] with [facility]. She declined " further interventions, including a video swallow study, and was managed with PRN comfort medications. The patient passed away peacefully on 5/24/2025 at 01:07 AM.        Date and Time of Death: 5/24/2025 at 0107        Day of Discharge     Vital Signs:             Pertinent  and/or Most Recent Results     LAB RESULTS:      Lab 05/21/25  1203 05/21/25  0542 05/21/25  0520 05/20/25  1820 05/20/25  1812   WBC  --  12.92* 13.31* 22.38*  --    HEMOGLOBIN 8.3* 5.7* 5.8* 9.1*  --    HEMATOCRIT 26.5* 18.6* 19.2* 32.9*  --    PLATELETS  --  183 192 364  --    NEUTROS ABS  --  10.35* 10.60* 19.30*  --    IMMATURE GRANS (ABS)  --  0.18* 0.13* 0.59*  --    LYMPHS ABS  --  2.01 2.16 1.85  --    MONOS ABS  --  0.37 0.41 0.58  --    EOS ABS  --  0.00 0.00 0.03  --    MCV  --  93.5 94.1 101.9*  --    PROCALCITONIN  --   --   --  1.17*  --    LACTATE  --   --   --   --  1.4   PROTIME  --   --   --  15.4*  --    APTT  --   --   --  28.0  --          Lab 05/21/25  1203 05/21/25  0841 05/21/25  0452 05/21/25  0053 05/20/25 2003 05/20/25  1820   SODIUM 143 143 143 145 141 138   POTASSIUM 3.7 3.4* 3.7 3.8 4.7 4.8   CHLORIDE 108* 105 105 106 102 98   CO2 14.4* 10.5* 9.6* 6.0* 3.1* 3.4*   ANION GAP 20.6* 27.5* 28.4* 33.0* 35.9* 36.6*   BUN 17 17 17 17 17 18   CREATININE 2.06* 2.05* 2.04* 1.97* 2.12* 2.27*   EGFR 28.4* 28.5* 28.7* 29.9* 27.4* 25.2*   GLUCOSE 160* 202* 230* 245* 377* 397*   CALCIUM 7.3* 7.3* 7.4* 7.2* 7.7* 8.2*   IONIZED CALCIUM  --   --   --  1.12*  --   --    MAGNESIUM 1.7 1.7 1.9 1.9 2.2 2.3   PHOSPHORUS 2.3* 2.5 3.2 4.5 6.2* 6.6*   HEMOGLOBIN A1C  --   --   --   --   --  10.30*   TSH  --   --   --   --   --  2.890         Lab 05/21/25 0452 05/20/25  1820   TOTAL PROTEIN 4.6* 5.1*   ALBUMIN 3.0* 2.2*   GLOBULIN 1.6 2.9   ALT (SGPT) 13 18   AST (SGOT) 29 23   BILIRUBIN 0.3 0.2   ALK PHOS 235* 359*   LIPASE  --  21         Lab 05/20/25 2003 05/20/25  1820   HSTROP T 88* 95*   PROTIME  --  15.4*   INR  --  1.22*          Lab 05/21/25  0452   CHOLESTEROL 74   LDL CHOL 18   HDL CHOL 27*   TRIGLYCERIDES 180*         Lab 05/21/25  0841 05/21/25  0452 05/20/25  1840 05/20/25  1818   IRON 14*  --   --   --    IRON SATURATION (TSAT) 23  --   --   --    TIBC 60*  --   --   --    TRANSFERRIN 40*  --   --   --    FERRITIN  --  558.00*  --   --    VITAMIN B 12  --   --   --  >2,000*   ABO TYPING  --   --  A  --    RH TYPING  --   --  Positive  --    ANTIBODY SCREEN  --   --  Negative  --          Lab 05/20/25  2334 05/20/25  1908   PH, ARTERIAL  --  6.988*   PCO2, ARTERIAL  --  7.5*   PO2 ART  --  120.6*   O2 SATURATION ART  --  96.0   FIO2 21 21   HCO3 ART  --  1.8*   BASE EXCESS ART  --  -27.5*     Brief Urine Lab Results  (Last result in the past 365 days)        Color   Clarity   Blood   Leuk Est   Nitrite   Protein   CREAT   Urine HCG        05/20/25 1836 Yellow   Clear   Negative   Negative   Negative   Trace                 Microbiology Results (last 10 days)       Procedure Component Value - Date/Time    Respiratory Panel PCR w/COVID-19(SARS-CoV-2) [facility]NETTE/RAFAELA/[facility] In-House, NP Swab in UTM/VTM, 2 HR TAT - Swab, Nasopharynx [[id number]]  (Normal) Collected: 05/20/25 4961    Lab Status: Final result Specimen: Swab from Nasopharynx Updated: 05/21/25 0048     ADENOVIRUS, PCR Not Detected     Coronavirus 229E Not Detected     Coronavirus HKU1 Not Detected     Coronavirus NL63 Not Detected     Coronavirus OC43 Not Detected     COVID19 Not Detected     Human Metapneumovirus Not Detected     Human Rhinovirus/Enterovirus Not Detected     Influenza A PCR Not Detected     Influenza B PCR Not Detected     Parainfluenza Virus 1 Not Detected     Parainfluenza Virus 2 Not Detected     Parainfluenza Virus 3 Not Detected     Parainfluenza Virus 4 Not Detected     RSV, PCR Not Detected     Bordetella pertussis pcr Not Detected     Bordetella parapertussis PCR Not Detected     Chlamydophila pneumoniae PCR Not Detected     Mycoplasma pneumo by PCR  Not Detected    Narrative:      In the setting of a positive respiratory panel with a viral infection PLUS a negative procalcitonin without other underlying concern for bacterial infection, consider observing off antibiotics or discontinuation of antibiotics and continue supportive care. If the respiratory panel is positive for atypical bacterial infection (Bordetella pertussis, Chlamydophila pneumoniae, or Mycoplasma pneumoniae), consider antibiotic de-escalation to target atypical bacterial infection.    MRSA Screen, PCR (Inpatient) - Swab, Nares [[id number]]  (Normal) Collected: 05/20/25 5517    Lab Status: Final result Specimen: Swab from Nares Updated: 05/21/25 0113     MRSA PCR No MRSA Detected    Narrative:      The negative predictive value of this diagnostic test is high and should only be used to consider de-escalating anti-MRSA therapy. A positive result may indicate colonization with MRSA and must be correlated clinically.    Blood Culture - Blood, Wrist, Right [[id number]]  (Normal) Collected: 05/20/25 1826    Lab Status: Preliminary result Specimen: Blood from Wrist, Right Updated: 05/23/25 1831     Blood Culture No growth at 3 days    Narrative:      Less than seven (7) mL's of blood was collected.  Insufficient quantity may yield false negative results.    Blood Culture - Blood, Arm, Left [[id number]]  (Normal) Collected: 05/20/25 1820    Lab Status: Preliminary result Specimen: Blood from Arm, Left Updated: 05/23/25 1831     Blood Culture No growth at 3 days            XR Chest 1 View  Result Date: 5/21/2025  Impression: Impression: 1.New right internal jugular central venous catheter terminates in the upper right atrium. 2.Bibasilar atelectasis versus pneumonia. Electronically Signed: Clemente Dorman MD  5/21/2025 12:10 AM EDT  Workstation ID: [id number]    CT Abdomen Pelvis Without Contrast  Result Date: 5/20/2025  Impression: Impression: 1. Coronary calcification. 2. Patchy airspace opacity in the lung  bases possibly secondary to atelectasis or pneumonia. 3. Generalized anasarca. Small amount of abdominal and pelvic free fluid. 4. Punctate bilateral nonobstructing renal calcifications. 5. There appear to be areas of wall thickening throughout the large and small bowel possibly secondary to enterocolitis. Ischemia is felt to be less likely given the diffuse appearing nature. Electronically Signed: Markos Holland MD  2025 9:57 PM EDT  Workstation ID: [id number]    CT Head Without Contrast  Result Date: 2025  Impression: Impression: New 5 cm x 2 cm area of low-attenuation in the left PCA distribution suspicious for an area of infarction. No evidence of acute hemorrhage or midline shift. Electronically Signed: Markos Holland MD  2025 8:48 PM EDT  Workstation ID: [id number]    XR Chest 1 View  Result Date: 2025  Impression: Mild bibasal infiltrates. Electronically Signed: Markos Holland MD  2025 7:38 PM EDT  Workstation ID: [id number]              Results for orders placed during the hospital encounter of 25    Adult Transthoracic Echo Complete W/ Cont if Necessary Per Protocol    Interpretation Summary    Left ventricular systolic function is normal. Left ventricular ejection fraction appears to be 61 - 65%.    Left ventricular diastolic function was normal.    Estimated right ventricular systolic pressure from tricuspid regurgitation is mildly elevated (35-45 mmHg).    There is a trivial circumferential pericardial effusion. There is no evidence of cardiac tamponade.      Labs Pending at Discharge:  Pending Results       None            Procedures Performed           Consults:   Consults       Date and Time Order Name Status Description    2025  8:54 PM Inpatient Neurology Consult Stroke Completed               Discharge Details         Allergies   Allergen Reactions    Penicillins Unknown - High Severity         Discharge Disposition:       Diet:  Hospital:No active diet  order              CODE STATUS:  Code Status and Medical Interventions: No CPR (Do Not Attempt to Resuscitate); Comfort Measures   Ordered at: 05/21/25 1700     Code Status (Patient has no pulse and is not breathing):    No CPR (Do Not Attempt to Resuscitate)     Medical Interventions (Patient has pulse or is breathing):    Comfort Measures     Level Of Support Discussed With:    Next of Kin (If No Surrogate)         No future appointments.        Time spent on Discharge including face to face service:  >35 minutes    Signature: Electronically signed by Samanta Goss MD, 05/24/25, 12:50 EDT.  [place] Hospitalist Team

## 2025-05-25 LAB
BACTERIA SPEC AEROBE CULT: NORMAL
BACTERIA SPEC AEROBE CULT: NORMAL

## 2025-05-27 LAB — B-OH-BUTYR SERPL-MCNC: 136 MG/DL

## 2025-05-27 NOTE — CASE MANAGEMENT/SOCIAL WORK
Case Management Discharge Note      Final Note:  under [facility].      Selected Continued Care - Discharged on 2025 Admission date: 2025 - Discharge disposition:       Home Medical Care Coordination complete.      Service Provider Services Address Phone Fax Patient Preferred    [facility] [address] [place] [address] [phone] --                  Final Discharge Disposition Code: 20 -